# Patient Record
Sex: FEMALE | Race: WHITE | NOT HISPANIC OR LATINO | Employment: OTHER | URBAN - METROPOLITAN AREA
[De-identification: names, ages, dates, MRNs, and addresses within clinical notes are randomized per-mention and may not be internally consistent; named-entity substitution may affect disease eponyms.]

---

## 2017-01-09 ENCOUNTER — TRANSCRIBE ORDERS (OUTPATIENT)
Dept: ADMINISTRATIVE | Facility: HOSPITAL | Age: 37
End: 2017-01-09

## 2017-01-09 DIAGNOSIS — E04.9 NON-TOXIC GOITER: Primary | ICD-10-CM

## 2017-01-09 DIAGNOSIS — M25.561 RIGHT KNEE PAIN, UNSPECIFIED CHRONICITY: Primary | ICD-10-CM

## 2017-01-12 ENCOUNTER — HOSPITAL ENCOUNTER (OUTPATIENT)
Dept: RADIOLOGY | Facility: MEDICAL CENTER | Age: 37
Discharge: HOME | End: 2017-01-12
Payer: COMMERCIAL

## 2017-01-12 ENCOUNTER — TRANSCRIBE ORDERS (OUTPATIENT)
Dept: ADMINISTRATIVE | Facility: HOSPITAL | Age: 37
End: 2017-01-12

## 2017-01-12 ENCOUNTER — APPOINTMENT (OUTPATIENT)
Dept: LAB | Facility: MEDICAL CENTER | Age: 37
End: 2017-01-12
Payer: COMMERCIAL

## 2017-01-12 DIAGNOSIS — E04.9 ENLARGEMENT OF THYROID: Primary | ICD-10-CM

## 2017-01-12 DIAGNOSIS — E04.9 NON-TOXIC GOITER: ICD-10-CM

## 2017-01-12 LAB
T4 FREE SERPL-MCNC: 0.8 NG/DL (ref 0.76–1.46)
TSH SERPL DL<=0.05 MIU/L-ACNC: 1.22 UIU/ML (ref 0.36–3.74)

## 2017-01-12 PROCEDURE — 84439 ASSAY OF FREE THYROXINE: CPT | Performed by: FAMILY MEDICINE

## 2017-01-12 PROCEDURE — 84432 ASSAY OF THYROGLOBULIN: CPT | Performed by: FAMILY MEDICINE

## 2017-01-12 PROCEDURE — 86376 MICROSOMAL ANTIBODY EACH: CPT | Performed by: FAMILY MEDICINE

## 2017-01-12 PROCEDURE — 36415 COLL VENOUS BLD VENIPUNCTURE: CPT | Performed by: FAMILY MEDICINE

## 2017-01-12 PROCEDURE — 84443 ASSAY THYROID STIM HORMONE: CPT | Performed by: FAMILY MEDICINE

## 2017-01-12 PROCEDURE — 76536 US EXAM OF HEAD AND NECK: CPT

## 2017-01-12 PROCEDURE — 86800 THYROGLOBULIN ANTIBODY: CPT | Performed by: FAMILY MEDICINE

## 2017-01-13 LAB — THYROPEROXIDASE AB SERPL-ACNC: 6 IU/ML (ref 0–34)

## 2017-01-19 ENCOUNTER — HOSPITAL ENCOUNTER (OUTPATIENT)
Dept: MRI IMAGING | Facility: HOSPITAL | Age: 37
Discharge: HOME | End: 2017-01-19
Payer: COMMERCIAL

## 2017-01-19 ENCOUNTER — TELEPHONE (OUTPATIENT)
Dept: OTHER | Facility: HOSPITAL | Age: 37
End: 2017-01-19

## 2017-01-19 DIAGNOSIS — M25.561 RIGHT KNEE PAIN, UNSPECIFIED CHRONICITY: ICD-10-CM

## 2017-01-19 PROCEDURE — 73721 MRI JNT OF LWR EXTRE W/O DYE: CPT

## 2017-01-20 LAB
THYROGLOB AB SERPL-ACNC: 1 IU/ML (ref 0–0.9)
THYROGLOB SERPL-MCNC: 23 NG/ML

## 2017-01-24 ENCOUNTER — HOSPITAL ENCOUNTER (OUTPATIENT)
Dept: RADIOLOGY | Facility: HOSPITAL | Age: 37
Discharge: HOME | End: 2017-01-24
Attending: FAMILY MEDICINE | Admitting: RADIOLOGY
Payer: COMMERCIAL

## 2017-01-24 DIAGNOSIS — E04.9 ENLARGEMENT OF THYROID: ICD-10-CM

## 2017-01-24 PROCEDURE — 76942 ECHO GUIDE FOR BIOPSY: CPT

## 2017-01-24 PROCEDURE — 88172 CYTP DX EVAL FNA 1ST EA SITE: CPT | Performed by: DENTIST

## 2017-01-24 PROCEDURE — 88173 CYTOPATH EVAL FNA REPORT: CPT | Performed by: DENTIST

## 2017-01-24 PROCEDURE — 10022 HB FNA W/IMAGE: CPT

## 2017-01-24 RX ORDER — LIDOCAINE HYDROCHLORIDE 10 MG/ML
5 INJECTION, SOLUTION INFILTRATION; PERINEURAL
Status: COMPLETED | OUTPATIENT
Start: 2017-01-24 | End: 2017-01-24

## 2017-01-24 RX ORDER — SODIUM BICARBONATE 42 MG/ML
2.4 INJECTION, SOLUTION INTRAVENOUS
Status: COMPLETED | OUTPATIENT
Start: 2017-01-24 | End: 2017-01-24

## 2017-01-24 RX ADMIN — LIDOCAINE HYDROCHLORIDE 5 ML: 10 INJECTION, SOLUTION INFILTRATION; PERINEURAL at 09:35

## 2017-01-24 RX ADMIN — SODIUM BICARBONATE 2.4 MEQ: 42 SOLUTION INTRAVENOUS at 09:35

## 2017-03-13 ENCOUNTER — ALLSCRIPTS OFFICE VISIT (OUTPATIENT)
Dept: OTHER | Facility: OTHER | Age: 37
End: 2017-03-13

## 2017-03-13 DIAGNOSIS — E04.1 NONTOXIC SINGLE THYROID NODULE: ICD-10-CM

## 2017-03-13 DIAGNOSIS — R63.5 ABNORMAL WEIGHT GAIN: ICD-10-CM

## 2017-03-13 DIAGNOSIS — M25.561 PAIN IN RIGHT KNEE: ICD-10-CM

## 2017-03-21 ENCOUNTER — APPOINTMENT (OUTPATIENT)
Dept: PHYSICAL THERAPY | Facility: CLINIC | Age: 37
End: 2017-03-21
Payer: COMMERCIAL

## 2017-03-21 PROCEDURE — 97161 PT EVAL LOW COMPLEX 20 MIN: CPT

## 2017-03-21 PROCEDURE — 97110 THERAPEUTIC EXERCISES: CPT

## 2017-03-22 ENCOUNTER — GENERIC CONVERSION - ENCOUNTER (OUTPATIENT)
Dept: OTHER | Facility: OTHER | Age: 37
End: 2017-03-22

## 2017-03-23 ENCOUNTER — APPOINTMENT (OUTPATIENT)
Dept: PHYSICAL THERAPY | Facility: CLINIC | Age: 37
End: 2017-03-23
Payer: COMMERCIAL

## 2017-03-28 ENCOUNTER — APPOINTMENT (OUTPATIENT)
Dept: PHYSICAL THERAPY | Facility: CLINIC | Age: 37
End: 2017-03-28
Payer: COMMERCIAL

## 2017-03-30 ENCOUNTER — APPOINTMENT (OUTPATIENT)
Dept: PHYSICAL THERAPY | Facility: CLINIC | Age: 37
End: 2017-03-30
Payer: COMMERCIAL

## 2017-05-08 ENCOUNTER — ALLSCRIPTS OFFICE VISIT (OUTPATIENT)
Dept: OTHER | Facility: OTHER | Age: 37
End: 2017-05-08

## 2017-05-08 DIAGNOSIS — R53.83 OTHER FATIGUE: ICD-10-CM

## 2017-05-08 DIAGNOSIS — R63.5 ABNORMAL WEIGHT GAIN: ICD-10-CM

## 2017-05-08 DIAGNOSIS — E55.9 VITAMIN D DEFICIENCY: ICD-10-CM

## 2017-05-08 DIAGNOSIS — E04.1 NONTOXIC SINGLE THYROID NODULE: ICD-10-CM

## 2017-05-18 ENCOUNTER — GENERIC CONVERSION - ENCOUNTER (OUTPATIENT)
Dept: OTHER | Facility: OTHER | Age: 37
End: 2017-05-18

## 2017-05-26 ENCOUNTER — LAB CONVERSION - ENCOUNTER (OUTPATIENT)
Dept: OTHER | Facility: OTHER | Age: 37
End: 2017-05-26

## 2017-05-26 LAB
25(OH)D3 SERPL-MCNC: 18 NG/ML (ref 30–100)
A/G RATIO (HISTORICAL): 1.6 (CALC) (ref 1–2.5)
ACTH PLAS-MCNC: 19 PG/ML (ref 6–50)
ALBUMIN SERPL BCP-MCNC: 3.9 G/DL (ref 3.6–5.1)
ALBUMIN SERPL BCP-MCNC: 3.9 G/DL (ref 3.6–5.1)
ALP SERPL-CCNC: 51 U/L (ref 33–115)
ALT SERPL W P-5'-P-CCNC: 9 U/L (ref 6–29)
AST SERPL W P-5'-P-CCNC: 11 U/L (ref 10–30)
BASOPHILS # BLD AUTO: 0.5 %
BASOPHILS # BLD AUTO: 37 CELLS/UL (ref 0–200)
BILIRUB SERPL-MCNC: 0.4 MG/DL (ref 0.2–1.2)
BILIRUBIN DIRECT (HISTORICAL): 0.1 MG/DL
BUN SERPL-MCNC: 11 MG/DL (ref 7–25)
BUN/CREA RATIO (HISTORICAL): NORMAL (CALC) (ref 6–22)
CALCIUM SERPL-MCNC: 9.1 MG/DL (ref 8.6–10.2)
CALCIUM SERPL-MCNC: 9.1 MG/DL (ref 8.6–10.2)
CHLORIDE SERPL-SCNC: 106 MMOL/L (ref 98–110)
CO2 SERPL-SCNC: 24 MMOL/L (ref 20–31)
CORTIS AM PEAK SERPL-SCNC: 11.2 MCG/DL
CREAT SERPL-MCNC: 0.68 MG/DL (ref 0.5–1.1)
DEPRECATED RDW RBC AUTO: 15.8 % (ref 11–15)
DHEA-SO4 (HISTORICAL): 191 MCG/DL (ref 23–266)
EGFR AFRICAN AMERICAN (HISTORICAL): 128 ML/MIN/1.73M2
EGFR-AMERICAN CALC (HISTORICAL): 110 ML/MIN/1.73M2
EOSINOPHIL # BLD AUTO: 263 CELLS/UL (ref 15–500)
EOSINOPHIL # BLD AUTO: 3.6 %
GAMMA GLOBULIN (HISTORICAL): 2.4 G/DL (CALC) (ref 1.9–3.7)
GLUCOSE (HISTORICAL): 94 MG/DL (ref 65–99)
HCT VFR BLD AUTO: 35.5 % (ref 35–45)
HGB BLD-MCNC: 11.3 G/DL (ref 11.7–15.5)
INDIRECT BILIRUBIN (HISTORICAL): 0.3 MG/DL (CALC) (ref 0.2–1.2)
LYMPHOCYTES # BLD AUTO: 2278 CELLS/UL (ref 850–3900)
LYMPHOCYTES # BLD AUTO: 31.2 %
MCH RBC QN AUTO: 24.5 PG (ref 27–33)
MCHC RBC AUTO-ENTMCNC: 31.7 G/DL (ref 32–36)
MCV RBC AUTO: 77.5 FL (ref 80–100)
MONOCYTES # BLD AUTO: 679 CELLS/UL (ref 200–950)
MONOCYTES (HISTORICAL): 9.3 %
NEUTROPHILS # BLD AUTO: 4044 CELLS/UL (ref 1500–7800)
NEUTROPHILS # BLD AUTO: 55.4 %
PHOSPHATE SERPL-MCNC: 3.6 MG/DL (ref 2.5–4.5)
PLATELET # BLD AUTO: 262 THOUSAND/UL (ref 140–400)
PMV BLD AUTO: 9.8 FL (ref 7.5–12.5)
POTASSIUM SERPL-SCNC: 4.3 MMOL/L (ref 3.5–5.3)
PTH-INTACT SERPL-MCNC: 91 PG/ML (ref 14–64)
RBC # BLD AUTO: 4.58 MILLION/UL (ref 3.8–5.1)
SODIUM SERPL-SCNC: 139 MMOL/L (ref 135–146)
T3 SERPL-MCNC: 96 NG/DL (ref 76–181)
T3FREE SERPL-MCNC: 3 PG/ML (ref 2.3–4.2)
T4 FREE SERPL-MCNC: 0.9 NG/DL (ref 0.8–1.8)
TOTAL PROTEIN (HISTORICAL): 6.3 G/DL (ref 6.1–8.1)
TSH SERPL DL<=0.05 MIU/L-ACNC: 2.02 MIU/L
WBC # BLD AUTO: 7.3 THOUSAND/UL (ref 3.8–10.8)

## 2017-06-02 ENCOUNTER — GENERIC CONVERSION - ENCOUNTER (OUTPATIENT)
Dept: OTHER | Facility: OTHER | Age: 37
End: 2017-06-02

## 2017-06-05 ENCOUNTER — TRANSCRIBE ORDERS (OUTPATIENT)
Dept: ADMINISTRATIVE | Facility: HOSPITAL | Age: 37
End: 2017-06-05

## 2017-06-05 DIAGNOSIS — Z01.818 PRE-OP EXAMINATION: Primary | ICD-10-CM

## 2017-06-06 ENCOUNTER — HOSPITAL ENCOUNTER (OUTPATIENT)
Dept: ULTRASOUND IMAGING | Facility: HOSPITAL | Age: 37
Discharge: HOME | End: 2017-06-06
Attending: OTOLARYNGOLOGY
Payer: COMMERCIAL

## 2017-06-06 DIAGNOSIS — Z01.818 PRE-OP EXAMINATION: ICD-10-CM

## 2017-06-06 PROCEDURE — 76536 US EXAM OF HEAD AND NECK: CPT

## 2017-06-21 ENCOUNTER — HOSPITAL ENCOUNTER (OUTPATIENT)
Facility: HOSPITAL | Age: 37
Setting detail: OUTPATIENT SURGERY
Discharge: HOME | End: 2017-06-21
Attending: OTOLARYNGOLOGY | Admitting: OTOLARYNGOLOGY
Payer: COMMERCIAL

## 2017-06-21 ENCOUNTER — ANESTHESIA EVENT (OUTPATIENT)
Dept: PERIOP | Facility: HOSPITAL | Age: 37
End: 2017-06-21
Payer: COMMERCIAL

## 2017-06-21 ENCOUNTER — ANESTHESIA (OUTPATIENT)
Dept: PERIOP | Facility: HOSPITAL | Age: 37
End: 2017-06-21
Payer: COMMERCIAL

## 2017-06-21 VITALS
TEMPERATURE: 97 F | DIASTOLIC BLOOD PRESSURE: 60 MMHG | RESPIRATION RATE: 18 BRPM | HEIGHT: 66 IN | SYSTOLIC BLOOD PRESSURE: 112 MMHG | WEIGHT: 160 LBS | OXYGEN SATURATION: 96 % | BODY MASS INDEX: 25.71 KG/M2 | HEART RATE: 74 BPM

## 2017-06-21 DIAGNOSIS — E04.1 NONTOXIC SINGLE THYROID NODULE: ICD-10-CM

## 2017-06-21 LAB — EXT PREGNANCY TEST URINE: NORMAL

## 2017-06-21 PROCEDURE — 81025 URINE PREGNANCY TEST: CPT | Performed by: ANESTHESIOLOGY

## 2017-06-21 PROCEDURE — 88305 TISSUE EXAM BY PATHOLOGIST: CPT | Performed by: OTOLARYNGOLOGY

## 2017-06-21 PROCEDURE — 88307 TISSUE EXAM BY PATHOLOGIST: CPT | Performed by: OTOLARYNGOLOGY

## 2017-06-21 RX ORDER — FENTANYL CITRATE 50 UG/ML
INJECTION, SOLUTION INTRAMUSCULAR; INTRAVENOUS AS NEEDED
Status: DISCONTINUED | OUTPATIENT
Start: 2017-06-21 | End: 2017-06-21 | Stop reason: SURG

## 2017-06-21 RX ORDER — MAGNESIUM HYDROXIDE 1200 MG/15ML
LIQUID ORAL AS NEEDED
Status: DISCONTINUED | OUTPATIENT
Start: 2017-06-21 | End: 2017-06-21 | Stop reason: HOSPADM

## 2017-06-21 RX ORDER — SODIUM CHLORIDE, SODIUM LACTATE, POTASSIUM CHLORIDE, CALCIUM CHLORIDE 600; 310; 30; 20 MG/100ML; MG/100ML; MG/100ML; MG/100ML
INJECTION, SOLUTION INTRAVENOUS CONTINUOUS PRN
Status: DISCONTINUED | OUTPATIENT
Start: 2017-06-21 | End: 2017-06-21

## 2017-06-21 RX ORDER — ONDANSETRON 2 MG/ML
INJECTION INTRAMUSCULAR; INTRAVENOUS AS NEEDED
Status: DISCONTINUED | OUTPATIENT
Start: 2017-06-21 | End: 2017-06-21 | Stop reason: SURG

## 2017-06-21 RX ORDER — FENTANYL CITRATE/PF 50 MCG/ML
25 SYRINGE (ML) INJECTION
Status: COMPLETED | OUTPATIENT
Start: 2017-06-21 | End: 2017-06-21

## 2017-06-21 RX ORDER — OXYCODONE HYDROCHLORIDE AND ACETAMINOPHEN 5; 325 MG/1; MG/1
2 TABLET ORAL EVERY 4 HOURS PRN
Qty: 30 TABLET | Refills: 0 | Status: SHIPPED | OUTPATIENT
Start: 2017-06-21 | End: 2017-07-01

## 2017-06-21 RX ORDER — LIDOCAINE HYDROCHLORIDE AND EPINEPHRINE 10; 10 MG/ML; UG/ML
INJECTION, SOLUTION INFILTRATION; PERINEURAL AS NEEDED
Status: DISCONTINUED | OUTPATIENT
Start: 2017-06-21 | End: 2017-06-21 | Stop reason: HOSPADM

## 2017-06-21 RX ORDER — EPHEDRINE SULFATE 50 MG/ML
INJECTION, SOLUTION INTRAVENOUS AS NEEDED
Status: DISCONTINUED | OUTPATIENT
Start: 2017-06-21 | End: 2017-06-21 | Stop reason: SURG

## 2017-06-21 RX ORDER — SUCCINYLCHOLINE CHLORIDE 20 MG/ML
INJECTION INTRAMUSCULAR; INTRAVENOUS AS NEEDED
Status: DISCONTINUED | OUTPATIENT
Start: 2017-06-21 | End: 2017-06-21 | Stop reason: SURG

## 2017-06-21 RX ORDER — ONDANSETRON 2 MG/ML
4 INJECTION INTRAMUSCULAR; INTRAVENOUS EVERY 4 HOURS PRN
Status: DISCONTINUED | OUTPATIENT
Start: 2017-06-21 | End: 2017-06-21 | Stop reason: HOSPADM

## 2017-06-21 RX ORDER — LIDOCAINE HYDROCHLORIDE 10 MG/ML
INJECTION, SOLUTION INFILTRATION; PERINEURAL AS NEEDED
Status: DISCONTINUED | OUTPATIENT
Start: 2017-06-21 | End: 2017-06-21 | Stop reason: SURG

## 2017-06-21 RX ORDER — OXYCODONE HYDROCHLORIDE AND ACETAMINOPHEN 5; 325 MG/1; MG/1
1 TABLET ORAL EVERY 4 HOURS PRN
Status: DISCONTINUED | OUTPATIENT
Start: 2017-06-21 | End: 2017-06-21 | Stop reason: HOSPADM

## 2017-06-21 RX ORDER — PROPOFOL 10 MG/ML
INJECTION, EMULSION INTRAVENOUS AS NEEDED
Status: DISCONTINUED | OUTPATIENT
Start: 2017-06-21 | End: 2017-06-21 | Stop reason: SURG

## 2017-06-21 RX ORDER — MIDAZOLAM HYDROCHLORIDE 1 MG/ML
INJECTION INTRAMUSCULAR; INTRAVENOUS AS NEEDED
Status: DISCONTINUED | OUTPATIENT
Start: 2017-06-21 | End: 2017-06-21 | Stop reason: SURG

## 2017-06-21 RX ORDER — SODIUM CHLORIDE, SODIUM LACTATE, POTASSIUM CHLORIDE, CALCIUM CHLORIDE 600; 310; 30; 20 MG/100ML; MG/100ML; MG/100ML; MG/100ML
INJECTION, SOLUTION INTRAVENOUS CONTINUOUS PRN
Status: DISCONTINUED | OUTPATIENT
Start: 2017-06-21 | End: 2017-06-21 | Stop reason: SURG

## 2017-06-21 RX ADMIN — FENTANYL CITRATE 50 MCG: 50 INJECTION INTRAMUSCULAR; INTRAVENOUS at 12:10

## 2017-06-21 RX ADMIN — FENTANYL CITRATE 25 MCG: 50 INJECTION, SOLUTION INTRAMUSCULAR; INTRAVENOUS at 14:08

## 2017-06-21 RX ADMIN — LIDOCAINE HYDROCHLORIDE 50 MG: 10 INJECTION, SOLUTION INFILTRATION; PERINEURAL at 12:10

## 2017-06-21 RX ADMIN — REMIFENTANIL HYDROCHLORIDE 0.15 MCG/KG/MIN: 1 INJECTION, POWDER, LYOPHILIZED, FOR SOLUTION INTRAVENOUS at 12:10

## 2017-06-21 RX ADMIN — SUCCINYLCHOLINE CHLORIDE 100 MG: 20 INJECTION, SOLUTION INTRAMUSCULAR; INTRAVENOUS at 12:10

## 2017-06-21 RX ADMIN — HYDROMORPHONE HYDROCHLORIDE 0.2 MG: 1 INJECTION, SOLUTION INTRAMUSCULAR; INTRAVENOUS; SUBCUTANEOUS at 14:30

## 2017-06-21 RX ADMIN — FENTANYL CITRATE 25 MCG: 50 INJECTION, SOLUTION INTRAMUSCULAR; INTRAVENOUS at 14:25

## 2017-06-21 RX ADMIN — HYDROMORPHONE HYDROCHLORIDE 0.2 MG: 1 INJECTION, SOLUTION INTRAMUSCULAR; INTRAVENOUS; SUBCUTANEOUS at 14:35

## 2017-06-21 RX ADMIN — FENTANYL CITRATE 50 MCG: 50 INJECTION INTRAMUSCULAR; INTRAVENOUS at 13:35

## 2017-06-21 RX ADMIN — MIDAZOLAM HYDROCHLORIDE 2 MG: 1 INJECTION, SOLUTION INTRAMUSCULAR; INTRAVENOUS at 11:59

## 2017-06-21 RX ADMIN — EPHEDRINE SULFATE 5 MG: 50 INJECTION, SOLUTION INTRAMUSCULAR; INTRAVENOUS; SUBCUTANEOUS at 12:29

## 2017-06-21 RX ADMIN — ONDANSETRON 4 MG: 2 INJECTION INTRAMUSCULAR; INTRAVENOUS at 11:59

## 2017-06-21 RX ADMIN — SODIUM CHLORIDE, SODIUM LACTATE, POTASSIUM CHLORIDE, AND CALCIUM CHLORIDE: .6; .31; .03; .02 INJECTION, SOLUTION INTRAVENOUS at 11:56

## 2017-06-21 RX ADMIN — FENTANYL CITRATE 25 MCG: 50 INJECTION, SOLUTION INTRAMUSCULAR; INTRAVENOUS at 14:20

## 2017-06-21 RX ADMIN — DEXAMETHASONE SODIUM PHOSPHATE 10 MG: 10 INJECTION INTRAMUSCULAR; INTRAVENOUS at 12:10

## 2017-06-21 RX ADMIN — PROPOFOL 200 MG: 10 INJECTION, EMULSION INTRAVENOUS at 12:10

## 2017-06-21 RX ADMIN — FENTANYL CITRATE 25 MCG: 50 INJECTION, SOLUTION INTRAMUSCULAR; INTRAVENOUS at 14:15

## 2017-06-21 RX ADMIN — HYDROMORPHONE HYDROCHLORIDE 0.2 MG: 1 INJECTION, SOLUTION INTRAMUSCULAR; INTRAVENOUS; SUBCUTANEOUS at 14:40

## 2017-06-21 RX ADMIN — OXYCODONE HYDROCHLORIDE AND ACETAMINOPHEN 1 TABLET: 5; 325 TABLET ORAL at 16:11

## 2017-08-14 ENCOUNTER — ALLSCRIPTS OFFICE VISIT (OUTPATIENT)
Dept: OTHER | Facility: OTHER | Age: 37
End: 2017-08-14

## 2017-12-05 ENCOUNTER — GENERIC CONVERSION - ENCOUNTER (OUTPATIENT)
Dept: OTHER | Facility: OTHER | Age: 37
End: 2017-12-05

## 2017-12-05 DIAGNOSIS — R63.5 ABNORMAL WEIGHT GAIN: ICD-10-CM

## 2017-12-05 DIAGNOSIS — R53.83 OTHER FATIGUE: ICD-10-CM

## 2017-12-05 DIAGNOSIS — E04.1 NONTOXIC SINGLE THYROID NODULE: ICD-10-CM

## 2018-01-11 NOTE — RESULT NOTES
Discussion/Summary   Thyroid function is normal  Vitamin D is low  Take viatmin D 5000 units over-the-counter  Hemoglobin is slightly low  Following the PCP for the same  Cortisol level is normal  send a copy of results if desired        Verified Results  (1) RENAL FUNCTION PANEL [id number] 07:30AM Michelle Delgado     Test Name Result Flag Reference   GLUCOSE 94 mg/dL  65-99   Fasting reference interval   UREA NITROGEN (BUN) 11 mg/dL  7-25   CREATININE 0 68 mg/dL  0 50-1 10   eGFR NON-AFR   AMERICAN 110 mL/min/1 73m2  > OR = 60   eGFR AFRICAN AMERICAN 128 mL/min/1 73m2  > OR = 60   BUN/CREATININE RATIO   0-69   NOT APPLICABLE (calc)   SODIUM 139 mmol/L  135-146   POTASSIUM 4 3 mmol/L  3 5-5 3   CHLORIDE 106 mmol/L     CARBON DIOXIDE 24 mmol/L  20-31   CALCIUM 9 1 mg/dL  8 6-10 2   PHOSPHATE (AS PHOSPHORUS) 3 6 mg/dL  2 5-4 5   ALBUMIN 3 9 g/dL  3 6-5 1     (1) HEPATIC FUNCTION PANEL [id number] 07:30AM Michelle Delgado     Test Name Result Flag Reference   PROTEIN, TOTAL 6 3 g/dL  6 1-8 1   ALBUMIN 3 9 g/dL  3 6-5 1   GLOBULIN 2 4 g/dL (calc)  1 9-3 7   ALBUMIN/GLOBULIN RATIO 1 6 (calc)  1 0-2 5   BILIRUBIN, TOTAL 0 4 mg/dL  0 2-1 2   BILIRUBIN, DIRECT 0 1 mg/dL  < OR = 0 2   BILIRUBIN, INDIRECT 0 3 mg/dL (calc)  0 2-1 2   ALKALINE PHOSPHATASE 51 U/L     AST 11 U/L  10-30   ALT 9 U/L  6-29     (1) CBC/PLT/DIFF [id number] 07:30AM Michelle Delgado     Test Name Result Flag Reference   WHITE BLOOD CELL COUNT 7 3 Thousand/uL  3 8-10 8   RED BLOOD CELL COUNT 4 58 Million/uL  3 80-5 10   HEMOGLOBIN 11 3 g/dL L 11 7-15 5   HEMATOCRIT 35 5 %  35 0-45 0   MCV 77 5 fL L 80 0-100 0   MCH 24 5 pg L 27 0-33 0   MCHC 31 7 g/dL L 32 0-36 0   RDW 15 8 % H 11 0-15 0   PLATELET COUNT 636 Thousand/uL  140-400   ABSOLUTE NEUTROPHILS 4044 cells/uL  1167-8579   ABSOLUTE LYMPHOCYTES 2278 cells/uL  850-3900   ABSOLUTE MONOCYTES 679 cells/uL  200-950   ABSOLUTE EOSINOPHILS 263 cells/uL     ABSOLUTE BASOPHILS 37 cells/uL  0-200 NEUTROPHILS 55 4 %     LYMPHOCYTES 31 2 %     MONOCYTES 9 3 %     EOSINOPHILS 3 6 %     BASOPHILS 0 5 %     MPV 9 8 fL  7 5-12 5     (1) ADRENOCORTICOTROPHIN [id number] 07:30AM "[employer]     Test Name Result Flag Reference   ACTH, PLASMA 19 pg/mL  6-50   Reference range applies only to specimens collected           between 7-10 AM      (1) CORTISOL AM SPECIMEN [id number] 07:30AM "[employer]     Test Name Result Flag Reference   CORTISOL, A M  11 2 mcg/dL     Reference Range  8 a m  (7-9 a m ) Specimen: 4 0-22 0     (1) DHEA-S [id number] 07:30AM "[employer]     Test Name Result Flag Reference   DHEA SULFATE 191 mcg/dL       (1) FREE T3 [id number] 07:30AM "[employer]   REPORT COMMENT:  FASTING:YES     Test Name Result Flag Reference   T3, FREE 3 0 pg/mL  2 3-4 2     (1) T3 TOTAL [id number] 07:30AM "[employer]     Test Name Result Flag Reference   T3, TOTAL 96 ng/dL       (1) T4, FREE [id number] 07:30AM "[employer]     Test Name Result Flag Reference   T4, FREE 0 9 ng/dL  0 8-1 8     (Q) PTH, INTACT AND CALCIUM [id number] 07:30AM "[employer]     Test Name Result Flag Reference   PARATHYROID HORMONE,$INTACT 91 pg/mL H 14-64   Interpretive Guide    Intact PTH           Calcium  ------------------    ----------           -------  Normal Parathyroid    Normal               Normal  Hypoparathyroidism    Low or Low Normal    Low  Hyperparathyroidism     Primary            Normal or High       High     Secondary          High                 Normal or Low     Tertiary           High                 High  Non-Parathyroid     Hypercalcemia      Low or Low Normal    High   CALCIUM 9 1 mg/dL  8 6-10 2     (Q) TSH, 3RD GENERATION [id number] 07:30AM "[employer]     Test Name Result Flag Reference   TSH 2 02 mIU/L     Reference Range                         > or = 20 Years  0 40-4 50                              Pregnancy Ranges            First trimester    0 26-2 66            Second trimester   0 55-2 73 Third trimester    0 43-2 91     *(Q) VITAMIN D, 25-HYDROXY, LC/MS/MS [id number] 07:30AM Jabier Chow     Test Name Result Flag Reference   VITAMIN D, 25-OH, TOTAL 18 ng/mL L    Vitamin D Status         25-OH Vitamin D:     Deficiency:                    <20 ng/mL  Insufficiency:             20 - 29 ng/mL  Optimal:                 > or = 30 ng/mL     For 25-OH Vitamin D testing on patients on   D2-supplementation and patients for whom quantitation   of D2 and D3 fractions is required, the QuestAssureD(TM)  25-OH VIT D, (D2,D3), LC/MS/MS is recommended: order   code 25730 (patients >2yrs)  For more information on this test, go to:  http://Sandata/faq/MSP490  (This link is being provided for   informational/educational purposes only )

## 2018-01-13 NOTE — MISCELLANEOUS
Message   Recorded as Task   Date: 05/18/2017 12:44 PM, Created By: Cinthia Smith   Task Name: Care Coordination   Assigned To: Maury Robertson   Regarding Patient: Bre Greco, Status: Active   Comment:    Cinthia Smith - 18 May 2017 12:44 PM     TASK CREATED  Patient called rather upset  She said she knows all of her testing came back negative  She had been having a difficult time swallowing and does not feel well  [facility] - 18 May 2017 12:46 PM     TASK REASSIGNED: Previously Assigned To ctr dm & endo clinical parisa,team  patient had a negative biopsy and is concerned because she is now experiencing trouble swallowing  Pt requested a different provider but since you saw her last figured we would ask what you recommend for her? She is not scheduled to come back for follow up until August    I spoke to her and again explained the management of nodules  She has not seen Dr Paul Amezquita  He can do surgery for a nodule that may cause difficulty swallowing  She is wondering why she has this cyst where it came from and how can it be removed without thyroid surgery?  Aspiration of cyst alone can remove the fluid but may come back in future      Signatures   Electronically signed by : MIGUEL Obregon ; May 18 2017  2:21PM EST                       (Author)

## 2018-01-14 VITALS
HEIGHT: 65 IN | HEART RATE: 72 BPM | BODY MASS INDEX: 26.68 KG/M2 | RESPIRATION RATE: 16 BRPM | WEIGHT: 160.13 LBS | DIASTOLIC BLOOD PRESSURE: 62 MMHG | SYSTOLIC BLOOD PRESSURE: 100 MMHG

## 2018-01-14 VITALS
BODY MASS INDEX: 26.74 KG/M2 | HEIGHT: 65 IN | SYSTOLIC BLOOD PRESSURE: 110 MMHG | DIASTOLIC BLOOD PRESSURE: 74 MMHG | WEIGHT: 160.5 LBS | HEART RATE: 64 BPM

## 2018-01-15 NOTE — MISCELLANEOUS
Provider Comments  Provider Comments:   NO CALL NO SHOW  Reschedule follow-up appointment  Signatures   Electronically signed by : Matthew Tinoco, [address];  Aug 16 2017  4:55PM EST                       (Author)

## 2018-01-23 ENCOUNTER — LAB CONVERSION - ENCOUNTER (OUTPATIENT)
Dept: OTHER | Facility: OTHER | Age: 38
End: 2018-01-23

## 2018-01-23 ENCOUNTER — GENERIC CONVERSION - ENCOUNTER (OUTPATIENT)
Dept: OTHER | Facility: OTHER | Age: 38
End: 2018-01-23

## 2018-01-23 LAB
T4 FREE SERPL-MCNC: 1 NG/DL (ref 0.8–1.8)
TSH SERPL DL<=0.05 MIU/L-ACNC: 5.22 MIU/L

## 2018-01-24 NOTE — RESULT NOTES
Discussion/Summary   Please inform patient that her TSH is slightly high, with normal free T4 will need repeat TSH, free T4 and free T3 before her next appointment    Please send the lab slips     Verified Results  (1) T4, FREE [id number] 01:03PM Elena Loredo     Test Name Result Flag Reference   T4, FREE 1 0 ng/dL  0 8-1 8     (Q) TSH, 3RD GENERATION [id number] 01:03PM Elena Loredo   REPORT COMMENT:  FASTING:NO     Test Name Result Flag Reference   TSH 5 22 mIU/L H    Reference Range                         > or = 20 Years  0 40-4 50                              Pregnancy Ranges            First trimester    0 26-2 66            Second trimester   0 55-2 73            Third trimester    0 43-2 91

## 2018-04-11 DIAGNOSIS — E04.1 NONTOXIC SINGLE THYROID NODULE: ICD-10-CM

## 2018-04-11 DIAGNOSIS — R63.5 ABNORMAL WEIGHT GAIN: ICD-10-CM

## 2018-04-11 DIAGNOSIS — E55.9 VITAMIN D DEFICIENCY: ICD-10-CM

## 2018-04-11 DIAGNOSIS — I48.0 PAROXYSMAL ATRIAL FIBRILLATION (HCC): ICD-10-CM

## 2018-04-11 DIAGNOSIS — R53.83 OTHER FATIGUE: ICD-10-CM

## 2018-07-30 ENCOUNTER — TELEPHONE (OUTPATIENT)
Dept: CARDIOLOGY CLINIC | Facility: CLINIC | Age: 38
End: 2018-07-30

## 2018-07-30 DIAGNOSIS — I48.91 ATRIAL FIBRILLATION, UNSPECIFIED TYPE (HCC): Primary | ICD-10-CM

## 2018-07-30 PROBLEM — I48.0 PAROXYSMAL ATRIAL FIBRILLATION (HCC): Status: ACTIVE | Noted: 2018-07-30

## 2018-07-30 PROBLEM — R07.89 OTHER CHEST PAIN: Status: ACTIVE | Noted: 2018-07-30

## 2018-07-30 NOTE — TELEPHONE ENCOUNTER
Pt reporting 2 incidents of mid sternal CP lasting for approx 28 seconds  Pt requesting an appt  S/W Dr Mary Valerio, 48 hour holter ordered and scheduled an OV

## 2018-07-31 ENCOUNTER — OFFICE VISIT (OUTPATIENT)
Dept: CARDIOLOGY CLINIC | Facility: CLINIC | Age: 38
End: 2018-07-31
Payer: COMMERCIAL

## 2018-07-31 VITALS
HEIGHT: 66 IN | OXYGEN SATURATION: 100 % | BODY MASS INDEX: 24.2 KG/M2 | SYSTOLIC BLOOD PRESSURE: 112 MMHG | DIASTOLIC BLOOD PRESSURE: 72 MMHG | HEART RATE: 59 BPM | WEIGHT: 150.6 LBS

## 2018-07-31 DIAGNOSIS — R07.89 OTHER CHEST PAIN: Primary | ICD-10-CM

## 2018-07-31 DIAGNOSIS — I48.0 PAROXYSMAL ATRIAL FIBRILLATION (HCC): ICD-10-CM

## 2018-07-31 PROCEDURE — 93000 ELECTROCARDIOGRAM COMPLETE: CPT | Performed by: INTERNAL MEDICINE

## 2018-07-31 PROCEDURE — 99214 OFFICE O/P EST MOD 30 MIN: CPT | Performed by: INTERNAL MEDICINE

## 2018-07-31 RX ORDER — FLUOXETINE 20 MG/1
20 TABLET, FILM COATED ORAL DAILY
COMMUNITY
End: 2019-05-22

## 2018-07-31 NOTE — PROGRESS NOTES
Cardiology Follow Up    Opal Cordova  1980  [id number]  [address] [facility] [address] [facility]  [phone]    1  Other chest pain  POCT ECG    Stress test only, exercise   2  Paroxysmal atrial fibrillation (HCC)         Discussion/Summary:      Atypical chest pain  EKG is unchanged  Symptoms are not exertional   Very brief and spaced out over the course of a week  Can check exercise treadmill test to exclude any ischemia  This does not sound suspicious for an arrhythmia or recurrence of her atrial fibrillation  I do not think a Holter monitor will be of much use here because her symptoms are so infrequent as well  Advised patient that if she should have recurrence of these, we can perform an event recorder  Otherwise, if stress test without any  Significant abnormalities, can follow up in the future with any change in symptoms  History of Present Illness:   [facility] is now 34years old  She has a history of paroxysmal atrial fibrillation  She has had 2 episodes of this  Both of these were in the setting of being pregnant  She was on sotalol during her pregnancies  After delivery, arrhythmia resolved  She was taken off of sotalol, and based on symptoms has remained stable over the last several years  I last saw her in 2016  she recently contacted the office because she was having symptoms of chest pain  She has had about 2 episodes over the last 2 weeks  These last less than a minute really only just a couple of seconds each  She feels that is kind of a squeezing sensation  It is distinct and different from her atrial fibrillation previously  She has felt no palpitations  No other associated symptoms  In between these episodes, she has felt perfectly normal without any limitations  On 1 occasion, she was sitting at the kitchen table    On the other, she was lying in bed  Does not seem positional     Problem List     Other chest pain    Paroxysmal atrial fibrillation ([address] )        History reviewed  No pertinent past medical history  Social History     Social History    Marital status: /Civil Union     Spouse name: N/A    Number of children: N/A    Years of education: N/A     Occupational History    Not on file  Social History Main Topics    Smoking status: Never Smoker    Smokeless tobacco: Never Used    Alcohol use Yes      Comment: 4 drinks per week    Drug use: No    Sexual activity: Not on file     Other Topics Concern    Not on file     Social History Narrative    No narrative on file      Family History   Problem Relation Age of Onset    Thyroid disease Mother      Past Surgical History:   Procedure Laterality Date     SECTION      AK THYROID LOBECTOMY,UNILAT Right 2017    Procedure: THYROID LOBECTOMY WITH RECURRENT LARYNGEAL NERVE MONITORING (IMPULSE MONITORING); Surgeon: Catrachito Robins MD;  Location: [facility];  Service: ENT       Current Outpatient Prescriptions:     FLUoxetine (PROzac) 20 MG tablet, Take 20 mg by mouth daily, Disp: , Rfl:   No Known Allergies    Vitals:    18 0821   BP: 112/72   BP Location: Right arm   Patient Position: Sitting   Cuff Size: Standard   Pulse: 59   SpO2: 100%   Weight: 68 3 kg (150 lb 9 6 oz)   Height: 5' 6" (1 676 m)     Vitals:    18 0821   Weight: 68 3 kg (150 lb 9 6 oz)      Height: 5' 6" (167 6 cm)   Body mass index is 24 31 kg/m²      Physical Exam:  GENERAL: Alert, well appearing, and in no distress  HEENT:  PERRL, EOMI, no scleral icterus, no conjunctival pallor  NECK:  Supple, No elevated JVP, no thyromegaly, no carotid bruits  HEART:  Regular rate and rhythm, normal S1/S2, no S3/S4, no murmur or rub  LUNGS:  Clear to auscultation bilaterally  ABDOMEN:  Soft, non-tender, positive bowel sounds, no rebound or guarding  EXTREMITIES:  No edema  VASCULAR:  Normal pedal pulses   NEURO: No focal deficits,  SKIN: Normal without suspicious lesions on exposed skin      ROS:  Except as noted in HPI, is otherwise reviewed in detail and a 12 point review of systems is negative  Labs:  Lab Results   Component Value Date     05/19/2017    K 4 3 05/19/2017     05/19/2017    CREATININE 0 68 05/19/2017    BUN 11 05/19/2017    CO2 24 05/19/2017    ALT 9 05/19/2017    AST 11 05/19/2017    INR 0 99 11/08/2014    WBC 7 3 05/19/2017    HGB 11 3 (L) 05/19/2017    HCT 35 5 05/19/2017     05/19/2017     Testing:    Echo 11/9/14:  LEFT VENTRICLE:   Systolic function was at the lower limits of normal  Ejection fraction was   estimated to be 53 %  Left ventricular segmental and global longitudinal    strain   was within normal limits  There were no regional wall motion abnormalities  There was no evidence of concentric hypertrophy  TRICUSPID VALVE:   There was trace regurgitation  PULMONIC VALVE:   There was trace regurgitation  EKG:    Sinus rhythm  60 beats per minute  Poor anterior R-wave progression  Low voltage  No change when compared to prior

## 2019-05-22 ENCOUNTER — OFFICE VISIT (OUTPATIENT)
Dept: OBGYN CLINIC | Facility: CLINIC | Age: 39
End: 2019-05-22
Payer: COMMERCIAL

## 2019-05-22 ENCOUNTER — TELEPHONE (OUTPATIENT)
Dept: OBGYN CLINIC | Facility: CLINIC | Age: 39
End: 2019-05-22

## 2019-05-22 VITALS
DIASTOLIC BLOOD PRESSURE: 72 MMHG | BODY MASS INDEX: 25.07 KG/M2 | HEIGHT: 66 IN | SYSTOLIC BLOOD PRESSURE: 120 MMHG | WEIGHT: 156 LBS

## 2019-05-22 DIAGNOSIS — N92.0 MENORRHAGIA WITH REGULAR CYCLE: ICD-10-CM

## 2019-05-22 DIAGNOSIS — Z01.419 ROUTINE GYNECOLOGICAL EXAMINATION: Primary | ICD-10-CM

## 2019-05-22 DIAGNOSIS — Z12.31 ENCOUNTER FOR SCREENING MAMMOGRAM FOR MALIGNANT NEOPLASM OF BREAST: ICD-10-CM

## 2019-05-22 PROBLEM — M25.561 RIGHT KNEE PAIN: Status: ACTIVE | Noted: 2017-03-13

## 2019-05-22 PROBLEM — E55.9 MILD VITAMIN D DEFICIENCY: Status: ACTIVE | Noted: 2017-05-08

## 2019-05-22 PROBLEM — R63.5 ABNORMAL WEIGHT GAIN: Status: ACTIVE | Noted: 2017-03-13

## 2019-05-22 PROBLEM — R53.83 FEELING TIRED: Status: ACTIVE | Noted: 2017-03-13

## 2019-05-22 PROBLEM — E04.1 THYROID NODULE: Status: ACTIVE | Noted: 2017-03-13

## 2019-05-22 PROCEDURE — 99386 PREV VISIT NEW AGE 40-64: CPT | Performed by: PHYSICIAN ASSISTANT

## 2019-05-23 LAB
BASOPHILS # BLD AUTO: 86 CELLS/UL (ref 0–200)
BASOPHILS NFR BLD AUTO: 1.1 %
EOSINOPHIL # BLD AUTO: 320 CELLS/UL (ref 15–500)
EOSINOPHIL NFR BLD AUTO: 4.1 %
ERYTHROCYTE [DISTWIDTH] IN BLOOD BY AUTOMATED COUNT: 14.9 % (ref 11–15)
HCT VFR BLD AUTO: 31.9 % (ref 35–45)
HGB BLD-MCNC: 9.8 G/DL (ref 11.7–15.5)
LYMPHOCYTES # BLD AUTO: 1739 CELLS/UL (ref 850–3900)
LYMPHOCYTES NFR BLD AUTO: 22.3 %
MCH RBC QN AUTO: 22.4 PG (ref 27–33)
MCHC RBC AUTO-ENTMCNC: 30.7 G/DL (ref 32–36)
MCV RBC AUTO: 72.8 FL (ref 80–100)
MONOCYTES # BLD AUTO: 718 CELLS/UL (ref 200–950)
MONOCYTES NFR BLD AUTO: 9.2 %
NEUTROPHILS # BLD AUTO: 4937 CELLS/UL (ref 1500–7800)
NEUTROPHILS NFR BLD AUTO: 63.3 %
PLATELET # BLD AUTO: 290 THOUSAND/UL (ref 140–400)
PMV BLD REES-ECKER: 11.4 FL (ref 7.5–12.5)
RBC # BLD AUTO: 4.38 MILLION/UL (ref 3.8–5.1)
T4 FREE SERPL-MCNC: 0.8 NG/DL (ref 0.8–1.8)
TSH SERPL-ACNC: 3.09 MIU/L
WBC # BLD AUTO: 7.8 THOUSAND/UL (ref 3.8–10.8)

## 2019-05-30 LAB
HPV HR 12 DNA CVX QL NAA+PROBE: NOT DETECTED
HPV16 DNA SPEC QL NAA+PROBE: NOT DETECTED
HPV18 DNA SPEC QL NAA+PROBE: NOT DETECTED
THIN PREP CVX: NORMAL

## 2020-05-18 ENCOUNTER — OFFICE VISIT (OUTPATIENT)
Dept: OBGYN CLINIC | Facility: CLINIC | Age: 40
End: 2020-05-18
Payer: COMMERCIAL

## 2020-05-18 VITALS
HEIGHT: 66 IN | BODY MASS INDEX: 25.13 KG/M2 | TEMPERATURE: 100.6 F | DIASTOLIC BLOOD PRESSURE: 76 MMHG | SYSTOLIC BLOOD PRESSURE: 124 MMHG | WEIGHT: 156.4 LBS

## 2020-05-18 DIAGNOSIS — N71.9 ENDOMETRITIS: Primary | ICD-10-CM

## 2020-05-18 DIAGNOSIS — N71.9 ENDOMETRITIS: ICD-10-CM

## 2020-05-18 PROCEDURE — 99213 OFFICE O/P EST LOW 20 MIN: CPT | Performed by: OBSTETRICS & GYNECOLOGY

## 2020-05-18 RX ORDER — METRONIDAZOLE 500 MG/1
500 TABLET ORAL EVERY 12 HOURS SCHEDULED
Qty: 14 TABLET | Refills: 0 | Status: SHIPPED | OUTPATIENT
Start: 2020-05-18 | End: 2020-05-25

## 2020-05-18 RX ORDER — AZITHROMYCIN 250 MG/1
250 TABLET, FILM COATED ORAL EVERY 24 HOURS
Qty: 8 TABLET | Refills: 0 | Status: SHIPPED | OUTPATIENT
Start: 2020-05-18 | End: 2020-05-25

## 2020-05-18 RX ORDER — DOXYCYCLINE HYCLATE 100 MG/1
CAPSULE ORAL
Refills: 0 | OUTPATIENT
Start: 2020-05-18

## 2020-05-18 RX ORDER — DOXYCYCLINE HYCLATE 100 MG/1
100 TABLET, DELAYED RELEASE ORAL 2 TIMES DAILY
Qty: 14 TABLET | Refills: 0 | Status: SHIPPED | OUTPATIENT
Start: 2020-05-18 | End: 2020-05-18 | Stop reason: ALTCHOICE

## 2020-05-21 ENCOUNTER — TELEPHONE (OUTPATIENT)
Dept: OBGYN CLINIC | Facility: CLINIC | Age: 40
End: 2020-05-21

## 2020-05-21 LAB
C TRACH RRNA SPEC QL NAA+PROBE: NOT DETECTED
N GONORRHOEA RRNA SPEC QL NAA+PROBE: NOT DETECTED
QUESTION/PROBLEM: NORMAL
SL AMB CONTAINER TYPE: NORMAL
SL AMB FINAL RESOLUTION: NORMAL
SL AMB REPORT STATUS: NORMAL

## 2020-05-28 ENCOUNTER — TELEPHONE (OUTPATIENT)
Dept: OBGYN CLINIC | Facility: CLINIC | Age: 40
End: 2020-05-28

## 2020-06-01 DIAGNOSIS — B37.3 VAGINAL YEAST INFECTION: Primary | ICD-10-CM

## 2020-06-01 RX ORDER — FLUCONAZOLE 150 MG/1
TABLET ORAL
Qty: 2 TABLET | Refills: 0 | Status: SHIPPED | OUTPATIENT
Start: 2020-06-01 | End: 2020-06-03

## 2020-06-24 ENCOUNTER — TELEPHONE (OUTPATIENT)
Dept: OBGYN CLINIC | Facility: CLINIC | Age: 40
End: 2020-06-24

## 2020-12-08 ENCOUNTER — OFFICE VISIT (OUTPATIENT)
Dept: URGENT CARE | Age: 40
End: 2020-12-08
Payer: COMMERCIAL

## 2020-12-08 VITALS — BODY MASS INDEX: 24.11 KG/M2 | WEIGHT: 150 LBS | HEIGHT: 66 IN

## 2020-12-08 DIAGNOSIS — Z20.822 EXPOSURE TO COVID-19 VIRUS: Primary | ICD-10-CM

## 2020-12-08 PROCEDURE — U0003 INFECTIOUS AGENT DETECTION BY NUCLEIC ACID (DNA OR RNA); SEVERE ACUTE RESPIRATORY SYNDROME CORONAVIRUS 2 (SARS-COV-2) (CORONAVIRUS DISEASE [COVID-19]), AMPLIFIED PROBE TECHNIQUE, MAKING USE OF HIGH THROUGHPUT TECHNOLOGIES AS DESCRIBED BY CMS-2020-01-R: HCPCS | Performed by: NURSE PRACTITIONER

## 2020-12-08 PROCEDURE — G0382 LEV 3 HOSP TYPE B ED VISIT: HCPCS | Performed by: NURSE PRACTITIONER

## 2020-12-10 LAB — SARS-COV-2 RNA SPEC QL NAA+PROBE: NOT DETECTED

## 2021-01-25 ENCOUNTER — TELEPHONE (OUTPATIENT)
Dept: OBGYN CLINIC | Facility: CLINIC | Age: 41
End: 2021-01-25

## 2021-01-25 NOTE — TELEPHONE ENCOUNTER
Pt states for the last week she has been experiencing mild pelvic pain  Discharge is thicker and a cream whiter color  Denies any odor  Denies cramping or sharp stabbing pelvic pain  Apt scheduled tomorrow at 1400

## 2021-01-26 ENCOUNTER — OFFICE VISIT (OUTPATIENT)
Dept: OBGYN CLINIC | Facility: CLINIC | Age: 41
End: 2021-01-26
Payer: COMMERCIAL

## 2021-01-26 VITALS
HEIGHT: 66 IN | SYSTOLIC BLOOD PRESSURE: 110 MMHG | BODY MASS INDEX: 24.43 KG/M2 | DIASTOLIC BLOOD PRESSURE: 64 MMHG | WEIGHT: 152 LBS

## 2021-01-26 DIAGNOSIS — B96.89 BV (BACTERIAL VAGINOSIS): ICD-10-CM

## 2021-01-26 DIAGNOSIS — N76.0 BV (BACTERIAL VAGINOSIS): ICD-10-CM

## 2021-01-26 DIAGNOSIS — N89.8 VAGINAL DISCHARGE: Primary | ICD-10-CM

## 2021-01-26 LAB
BV WHIFF TEST VAG QL: ABNORMAL
CLUE CELLS SPEC QL WET PREP: ABNORMAL
PH SMN: 5.5 [PH]
SL AMB POCT WET MOUNT: ABNORMAL
T VAGINALIS VAG QL WET PREP: ABNORMAL
YEAST VAG QL WET PREP: ABNORMAL

## 2021-01-26 PROCEDURE — 99213 OFFICE O/P EST LOW 20 MIN: CPT | Performed by: NURSE PRACTITIONER

## 2021-01-26 RX ORDER — METRONIDAZOLE 500 MG/1
500 TABLET ORAL EVERY 12 HOURS SCHEDULED
Qty: 14 TABLET | Refills: 0 | Status: SHIPPED | OUTPATIENT
Start: 2021-01-26 | End: 2021-02-02

## 2021-01-26 NOTE — PATIENT INSTRUCTIONS
Endometrial Ablation - menorrhagia N92 0  WHAT YOU NEED TO KNOW:   What do I need to know about endometrial ablation? Endometrial ablation is a procedure to remove the endometrium (lining of your uterus)  You may need this procedure if you have heavy or abnormal vaginal bleeding  How do I prepare for the procedure? Your healthcare provider will talk to you about how to prepare for the procedure  You may be told not to eat or drink anything after midnight on the day of your procedure  You will also be told what medicines to take or not take on the day of your procedure  You may need someone to drive you home after the procedure and stay with you to make sure you are okay  What will happen during the procedure? You may be given local anesthesia to numb the area  You may instead be given general anesthesia to keep you asleep and free from pain during the procedure  Your healthcare provider will widen the opening of your cervix with medicine or medical tools  Ice, heated fluid, or electric energy may be used to remove the lining of your uterus  What should I expect after the procedure? You may feel some discomfort for a few days  This is normal and should stop soon  Contact your healthcare provider if any of the following becomes severe or continues:  · Cramps, similar to menstrual cramps, for 1 to 2 days    · Watery, bloody discharge for 2 to 3 days that may become light and last a few weeks    · Frequent urination for 24 hours    · Nausea    What are the risks of endometrial ablation? You may not be able to get pregnant after endometrial ablation  You may bleed more than expected or get an infection in your vagina, urinary tract, or uterus  Your cervix, uterus, or nearby organs may be burned or damaged  You may get a blood clot in your leg or arm  A blockage may form over months to years and cause blood to pool inside your uterus  This blockage may cause severe pain and you may need a hysterectomy   You may also need a hysterectomy if endometrial ablation does not work  CARE AGREEMENT:   You have the right to help plan your care  Learn about your health condition and how it may be treated  Discuss treatment options with your healthcare providers to decide what care you want to receive  You always have the right to refuse treatment  The above information is an  only  It is not intended as medical advice for individual conditions or treatments  Talk to your doctor, nurse or pharmacist before following any medical regimen to see if it is safe and effective for you  © Copyright [address] Information is for End User's use only and may not be sold, redistributed or otherwise used for commercial purposes  All illustrations and images included in CareNotes® are the copyrighted property of A D A M , Inc  or [address] [employer]   Metronidazole (By mouth)   Metronidazole (met-jamila-COSME-da-zole)  Treats bacterial infections  Brand Name(s): Flagsara Flagyl 375   There may be other brand names for this medicine  When This Medicine Should Not Be Used: This medicine is not right for everyone  Do not use if you had an allergic reaction to metronidazole or similar medicines  Do not use this medicine to treat trichomoniasis if you are in the first 3 months of pregnancy  How to Use This Medicine:   Capsule, Tablet, Long Acting Tablet  · Take your medicine as directed  Your dose may need to be changed several times to find what works best for you  · Capsule: Take with food or milk to avoid upset stomach  · Extended-release tablet: Take it on an empty stomach, 1 hour before or 2 hours after a meal   · Swallow the extended-release tablet whole  Do not crush, break, or chew it  · Take all of the medicine in your prescription to clear up your infection, even if you feel better after the first few doses  · Missed dose: Take a dose as soon as you remember   If it is almost time for your next dose, wait until then and take a regular dose  Do not take extra medicine to make up for a missed dose  · Store the medicine in a closed container at room temperature, away from heat, moisture, and direct light  Drugs and Foods to Avoid:   Ask your doctor or pharmacist before using any other medicine, including over-the-counter medicines, vitamins, and herbal products  · Do not use this medicine if you have taken disulfiram within the last 2 weeks  Do not drink alcohol or use medicine that contains alcohol or propylene glycol while using this medicine and for at least 3 days after treatment  · Some foods and medicines can affect how metronidazole works  Tell your doctor if you are using busulfan, cimetidine, lithium, phenobarbital, phenytoin, or a blood thinner (including warfarin)  Warnings While Using This Medicine:   · Tell your doctor if you are pregnant or breastfeeding, or if you have kidney disease, liver disease, a yeast infection (including oral thrush), Cockayne syndrome (genetic disorder), or a history of blood or bone marrow problems or seizures  · This medicine may cause the following problems:  ? Brain or nervous system problems, including peripheral neuropathy, encephalopathy, seizures, meningitis, or vision problems  ? Liver problems, which may be life-threatening  ? Serious skin reactions  · Trichomoniasis treatment:  Your doctor may want to also treat your sexual partner, even if he or she has no symptoms  Also, you may want to use a condom during sexual intercourse  These measures will help keep you from getting the infection back again from your partner  If you have any questions, ask your doctor  · Call your doctor if your symptoms do not improve or if they get worse  · Your doctor will do lab tests at regular visits to check on the effects of this medicine  Keep all appointments  · Tell any doctor or dentist who treats you that you are using this medicine   This medicine may affect certain medical test results  · Keep all medicine out of the reach of children  Never share your medicine with anyone  Possible Side Effects While Using This Medicine:   Call your doctor right away if you notice any of these side effects:  · Allergic reaction: Itching or hives, swelling in your face or hands, swelling or tingling in your mouth or throat, chest tightness, trouble breathing  · Blistering, peeling, red skin rash  · Confusion, drowsiness, headache, stiff neck or back  · Dark urine or pale stools, nausea, vomiting, loss of appetite, stomach pain, yellow skin or eyes  · Dizziness, problems with muscle control, clumsiness, shakiness, trouble talking  · Fever, chills, cough, sore throat, body aches  · Numbness, tingling, or burning pain in your hands, arms, legs, or feet  · Seizures  If you notice these less serious side effects, talk with your doctor:   · Unusual or unpleasant taste in your mouth  If you notice other side effects that you think are caused by this medicine, tell your doctor  Call your doctor for medical advice about side effects  You may report side effects to FDA at 5-719-FDA-9087  © Copyright [address] Information is for End User's use only and may not be sold, redistributed or otherwise used for commercial purposes  The above information is an  only  It is not intended as medical advice for individual conditions or treatments  Talk to your doctor, nurse or pharmacist before following any medical regimen to see if it is safe and effective for you  Bacterial Vaginosis   WHAT YOU NEED TO KNOW:   What is bacterial vaginosis? Bacterial vaginosis is an infection in the vagina  It may cause vaginitis (irritation and inflammation of the vagina)  The cause is not known  Bacteria normally found in the vagina are imbalanced  Your risk increases if you are sexually active, you use a douche, or you have an intrauterine device (IUD)       What are common signs and symptoms of bacterial vaginosis? · White, gray, or yellow vaginal discharge    · Vaginal discharge that smells like fish    · Itching or burning around the outside of your vagina    How is bacterial vaginosis diagnosed? Your healthcare provider will examine you and ask if you have other health conditions  He or she may need to take a sample of fluid from your vagina  This will be tested for the bacteria that causes vaginosis  How is bacterial vaginosis treated? Antibiotics are given to kill the bacteria  They may be given as a pill or as a cream to put in your vagina  What do I need to know about bacterial vaginosis and pregnancy? If you have bacterial vaginosis during pregnancy, your baby may be born early or have a low birth weight  Your healthcare provider may recommend testing for bacterial vaginosis before or during your pregnancy  He or she will talk to you about your risk for premature delivery, and make sure you know the benefits and risks of testing  How can I prevent bacterial vaginosis? · Keep your vaginal area clean and dry  Wear underwear and pantyhose with a cotton crotch  Wipe from front to back after you urinate or have a bowel movement  After you bathe, rinse soap from your vaginal area to decrease your risk for irritation  · Do not use products that cause irritation  Always use unscented tampons or sanitary pads  Do not use feminine sprays, powders, or scented tampons  They may cause irritation and increase your risk for vaginosis  Detergents and fabric softeners may also cause irritation  · Do not use a douche  This can cause an imbalance in healthy vaginal bacteria  · Use latex condoms during sex  This helps prevent another infection and keeps your partner from getting the infection  When should I call my doctor or gynecologist?   · Your symptoms come back or do not improve with treatment  · You have vaginal bleeding that is not your monthly period      · You have questions or concerns about your condition or care  CARE AGREEMENT:   You have the right to help plan your care  Learn about your health condition and how it may be treated  Discuss treatment options with your healthcare providers to decide what care you want to receive  You always have the right to refuse treatment  The above information is an  only  It is not intended as medical advice for individual conditions or treatments  Talk to your doctor, nurse or pharmacist before following any medical regimen to see if it is safe and effective for you  © Copyright [address] Information is for End User's use only and may not be sold, redistributed or otherwise used for commercial purposes   All illustrations and images included in CareNotes® are the copyrighted property of [employer]  or [address] [place] [address]

## 2021-01-26 NOTE — PROGRESS NOTES
Assessment/Plan:      Diagnoses and all orders for this visit:    Vaginal discharge  -     POCT wet mount    BV (bacterial vaginosis)  -     metroNIDAZOLE (FLAGYL) 500 mg tablet; Take 1 tablet (500 mg total) by mouth every 12 (twelve) hours for 7 days        -  Reviewed diagnosis of bacterial vaginosis  And treatment with metronidazole  Patient verbalized understanding to avoid alcohol use while taking medication  Written information provided  - hygiene reviewed including avoid scented soaps lotions and lubricants, avoid douching, avoid tight / restrictive clothing  Encouraged patient to clean diva cup per manufacture's instructions  Encouraged not to use antibacterial soap  Reviewed with patient probiotic may help alleviate episode  -reviewed with patient she is due for annual exam and mammogram  -patient states she has no insurance and is interested in revisiting possibility of endometrial ablation  Will call insurance to verify coverage and then make appointment    RTO for annual exam    Subjective:     Patient ID: Ted Liao is a 41 y o  female  HPI  here with complaints of vaginal discharge for 1 week  Discharge is described as thin, white  Associated symptoms include slight odor and abdominal cramping 2-3 days ago  Cramping has since resolved  Denies irritation or itching  Denies alleviating or aggravating factors  Has not tried any over-the-counter remedies  denies new partner, fever, chills, bowel or bladder concerns     last Pap smear 19 resulted NILM/ HR hpv negative  Has not had mammogram     Review of Systems   Constitutional: Negative for chills, fatigue and fever  Respiratory: Negative  Cardiovascular: Negative  Genitourinary: Positive for vaginal discharge  Negative for decreased urine volume, difficulty urinating, dyspareunia, enuresis, flank pain, frequency, genital sores, hematuria, menstrual problem, pelvic pain, urgency, vaginal bleeding and vaginal pain  Objective:     Physical Exam  Constitutional:       Appearance: Normal appearance  Cardiovascular:      Rate and Rhythm: Normal rate and regular rhythm  Pulmonary:      Effort: Pulmonary effort is normal       Breath sounds: Normal breath sounds  Abdominal:      General: Abdomen is flat  Palpations: Abdomen is soft  Hernia: There is no hernia in the left inguinal area or right inguinal area  Genitourinary:     General: Normal vulva  Labia:         Right: No rash, tenderness, lesion or injury  Left: No rash, tenderness, lesion or injury  Vagina: Vaginal discharge present  Cervix: Normal       Comments: Large amount of thin white vaginal discharge noted on exam  Lymphadenopathy:      Lower Body: No right inguinal adenopathy  No left inguinal adenopathy  Neurological:      Mental Status: She is alert and oriented to person, place, and time     Psychiatric:         Mood and Affect: Mood normal          Behavior: Behavior normal

## 2021-01-27 DIAGNOSIS — B96.89 BV (BACTERIAL VAGINOSIS): Primary | ICD-10-CM

## 2021-01-27 DIAGNOSIS — N76.0 BV (BACTERIAL VAGINOSIS): Primary | ICD-10-CM

## 2021-01-27 RX ORDER — METRONIDAZOLE 7.5 MG/G
1 GEL VAGINAL
Qty: 70 G | Refills: 0 | Status: SHIPPED | OUTPATIENT
Start: 2021-01-27 | End: 2021-02-01

## 2021-01-27 NOTE — TELEPHONE ENCOUNTER
T/c from patient states the Flagyl is making her stomach very upset , is it possible to have the metro gel instead   Please send to the [facility] listed in her chart    MM CMA

## 2021-02-03 DIAGNOSIS — B37.3 VAGINAL YEAST INFECTION: Primary | ICD-10-CM

## 2021-02-03 RX ORDER — FLUCONAZOLE 150 MG/1
150 TABLET ORAL EVERY OTHER DAY
Qty: 2 TABLET | Refills: 0 | Status: SHIPPED | OUTPATIENT
Start: 2021-02-03 | End: 2021-02-06

## 2021-02-03 NOTE — TELEPHONE ENCOUNTER
Pt took the five nights of metrogel 1/27-1/31/21 for her BV  Yesterday she started with a thick white discharge which is not normal for her with irritation  Pt would like a medication to treat her yeast infection  Diflucan sent to [facility] pharmacy  Pt aware if symptoms do no improve or worsen to contact office to schedule an apt

## 2021-02-05 ENCOUNTER — TELEPHONE (OUTPATIENT)
Dept: OBGYN CLINIC | Facility: CLINIC | Age: 41
End: 2021-02-05

## 2021-02-05 NOTE — TELEPHONE ENCOUNTER
Apt scheduled, has [employer] and doesn't believe that [facility] is on the card but will check and call back if it does  Pt aware endo bx to be done at apt and will discuss ablation procedure

## 2021-02-18 ENCOUNTER — TELEPHONE (OUTPATIENT)
Dept: OBGYN CLINIC | Facility: CLINIC | Age: 41
End: 2021-02-18

## 2021-02-19 ENCOUNTER — OFFICE VISIT (OUTPATIENT)
Dept: OBGYN CLINIC | Facility: CLINIC | Age: 41
End: 2021-02-19
Payer: COMMERCIAL

## 2021-02-19 VITALS
DIASTOLIC BLOOD PRESSURE: 78 MMHG | WEIGHT: 152 LBS | BODY MASS INDEX: 24.43 KG/M2 | HEIGHT: 66 IN | SYSTOLIC BLOOD PRESSURE: 116 MMHG

## 2021-02-19 DIAGNOSIS — N89.8 VAGINAL DISCHARGE: Primary | ICD-10-CM

## 2021-02-19 LAB
BV WHIFF TEST VAG QL: NORMAL
CLUE CELLS SPEC QL WET PREP: NORMAL
PH SMN: 7 [PH]
SL AMB POCT WET MOUNT: NORMAL
T VAGINALIS VAG QL WET PREP: NORMAL
YEAST VAG QL WET PREP: NORMAL

## 2021-02-19 PROCEDURE — 87210 SMEAR WET MOUNT SALINE/INK: CPT | Performed by: PHYSICIAN ASSISTANT

## 2021-02-19 PROCEDURE — 87070 CULTURE OTHR SPECIMN AEROBIC: CPT | Performed by: PHYSICIAN ASSISTANT

## 2021-02-19 PROCEDURE — 99213 OFFICE O/P EST LOW 20 MIN: CPT | Performed by: PHYSICIAN ASSISTANT

## 2021-02-19 NOTE — ASSESSMENT & PLAN NOTE
Reviewed ovulatory discharge seen on exam today  No abnormalities on wet mount today  Genital culture sent  For prevention: Recommend acidophilus or yogurt daily, avoid irritating soaps or lotions, no tight fitted pants or underwear, avoid bubble baths, do not stay in wet swimsuit  Office will call with results and appropriate follow up

## 2021-02-19 NOTE — PROGRESS NOTES
Assessment/Plan:    Vaginal discharge  Reviewed ovulatory discharge seen on exam today  No abnormalities on wet mount today  Genital culture sent  For prevention: Recommend acidophilus or yogurt daily, avoid irritating soaps or lotions, no tight fitted pants or underwear, avoid bubble baths, do not stay in wet swimsuit  Office will call with results and appropriate follow up  Problem List Items Addressed This Visit        Other    Vaginal discharge - Primary     Reviewed ovulatory discharge seen on exam today  No abnormalities on wet mount today  Genital culture sent  For prevention: Recommend acidophilus or yogurt daily, avoid irritating soaps or lotions, no tight fitted pants or underwear, avoid bubble baths, do not stay in wet swimsuit  Office will call with results and appropriate follow up  Relevant Orders    Genital Comprehensive Culture    POCT wet mount (Completed)            Subjective:      Patient ID: Sangita Pineda is a 41 y o  female  HPI   40 yo seen for persistent vaginal discharge  Patient was seen 1/26/2021 for vaginal discharge and slight abdominal cramping  Was diagnosed with bacterial vaginosis and treated with Metronidazole 500mg BID x 7 days  Patient could not tolerate oral therapy was given Metrogel x 5 nights  After Metrogel was completely patient reports had thick white discharge  Thought maybe yeast infection  Was treated with Diflucan x 2 doses  Thicker discharge  No itching  Slight odor  Patient had menses soon after  Reports after menses noticed thick white discharge was still present  Dull constant pelvic discomfort  Denies bowel or bladder issues  Reports menses are regular but have been getting heavier over the last few months  After last menses called office to make an appointment for uterine ablation discussion     The following portions of the patient's history were reviewed and updated as appropriate:   She  has a past medical history of A-fib ([facility] )  She   Patient Active Problem List    Diagnosis Date Noted    Vaginal discharge 2021    Routine gynecological examination 2019    Menorrhagia with regular cycle 2019    Other chest pain 2018    Mild vitamin D deficiency 2017    Abnormal weight gain 2017    Feeling tired 2017    Right knee pain 2017    Thyroid nodule 2017    Paroxysmal atrial fibrillation ([facility] ) 2015    Uterine scar from previous  delivery, antepartum condition or complication      She  has a past surgical history that includes  section and pr thyroid lobectomy,unilat (Right, 2017)  Her family history includes Breast cancer in her maternal aunt and maternal grandmother; Thyroid disease in her mother  She  reports that she has never smoked  She has never used smokeless tobacco  She reports current alcohol use  She reports that she does not use drugs  No current outpatient medications on file  No current facility-administered medications for this visit  She has No Known Allergies       Review of Systems   Constitutional: Negative for fatigue, fever and unexpected weight change  HENT: Negative for dental problem and sinus pressure  Eyes: Negative for visual disturbance  Respiratory: Negative for cough, shortness of breath and wheezing  Cardiovascular: Negative for chest pain  Gastrointestinal: Negative for abdominal pain, blood in stool, constipation, diarrhea, nausea and vomiting  Endocrine: Negative for polydipsia  Genitourinary: Negative for difficulty urinating, dyspareunia, dysuria, frequency, hematuria, pelvic pain and urgency  Musculoskeletal: Negative for arthralgias and back pain  Neurological: Negative for dizziness, seizures, light-headedness and headaches  Psychiatric/Behavioral: Negative for suicidal ideas  The patient is not nervous/anxious            Objective:      /78 (BP Location: Left arm, Patient Position: Sitting, Cuff Size: Standard)   Ht 5' 6" (1 676 m)   Wt 68 9 kg (152 lb)   LMP 02/05/2021 (Approximate)   BMI 24 53 kg/m²          Physical Exam  Constitutional:       Appearance: Normal appearance  She is well-developed  Neck:      Musculoskeletal: Neck supple  Thyroid: No thyroid mass or thyromegaly  Cardiovascular:      Rate and Rhythm: Normal rate and regular rhythm  Heart sounds: Normal heart sounds  No murmur  No friction rub  No gallop  Pulmonary:      Effort: Pulmonary effort is normal  No respiratory distress  Breath sounds: Normal breath sounds  No wheezing or rales  Abdominal:      General: Bowel sounds are normal  There is no distension  Palpations: Abdomen is soft  There is no mass  Tenderness: There is no abdominal tenderness  There is no guarding or rebound  Genitourinary:     Labia:         Right: No rash, tenderness, lesion or injury  Left: No rash, tenderness, lesion or injury  Urethra: No prolapse, urethral pain, urethral swelling or urethral lesion  Vagina: No signs of injury  Vaginal discharge (mostly clear white mucus discharge consistent with ovulatory discharge  small amount of thin white discharge  ) present  No erythema, tenderness or bleeding  Cervix: No cervical motion tenderness, discharge or friability  Uterus: Not deviated, not enlarged and not tender  Adnexa:         Right: No mass, tenderness or fullness  Left: No mass, tenderness or fullness  Lymphadenopathy:      Cervical: No cervical adenopathy  Upper Body:      Right upper body: No supraclavicular adenopathy  Left upper body: No supraclavicular adenopathy  Skin:     General: Skin is warm and dry  Coloration: Skin is not pale  Findings: No erythema or rash  Neurological:      Mental Status: She is alert and oriented to person, place, and time     Psychiatric:         Behavior: Behavior normal  Thought Content:  Thought content normal          Judgment: Judgment normal        wet mount: No trichomonas, clue cells, or yeast

## 2021-02-23 LAB — BACTERIA GENITAL AEROBE CULT: NORMAL

## 2021-03-03 ENCOUNTER — PROCEDURE VISIT (OUTPATIENT)
Dept: OBGYN CLINIC | Facility: CLINIC | Age: 41
End: 2021-03-03
Payer: COMMERCIAL

## 2021-03-03 VITALS
WEIGHT: 152 LBS | BODY MASS INDEX: 24.53 KG/M2 | SYSTOLIC BLOOD PRESSURE: 120 MMHG | TEMPERATURE: 97.7 F | DIASTOLIC BLOOD PRESSURE: 60 MMHG

## 2021-03-03 DIAGNOSIS — N92.0 MENORRHAGIA WITH REGULAR CYCLE: Primary | ICD-10-CM

## 2021-03-03 PROBLEM — Z01.419 ROUTINE GYNECOLOGICAL EXAMINATION: Status: RESOLVED | Noted: 2019-05-22 | Resolved: 2021-03-03

## 2021-03-03 PROCEDURE — 99213 OFFICE O/P EST LOW 20 MIN: CPT | Performed by: OBSTETRICS & GYNECOLOGY

## 2021-03-03 NOTE — PROGRESS NOTES
Here for endometrial biopsy with thoughts of proceeding with endometrial ablation but has not had any discussion about the procedure - menses are regular with very heavy for the 2nd and 3rd days for a total of 6 days - menses has always been like this but no recent changes - does affect lifestyle - does get cramping with menses -   Spent time reviewing options as well as risks and benefits of the procedures available  Will get pelvic US to evaluate uterus    Malik Dale is interested in having a Mirena for the menorrhagia  - given information to check coverage and then call for insertion  -     Iwona Hurst MD

## 2021-03-03 NOTE — PROGRESS NOTES
Endometrial biopsy    Date/Time: 3/3/2021 8:17 AM  Performed by: Nanda Brower MD  Authorized by: Nanda Brower MD

## 2021-03-10 ENCOUNTER — HOSPITAL ENCOUNTER (OUTPATIENT)
Dept: RADIOLOGY | Facility: MEDICAL CENTER | Age: 41
Discharge: HOME | End: 2021-03-10
Admitting: OBSTETRICS & GYNECOLOGY
Payer: COMMERCIAL

## 2021-03-10 DIAGNOSIS — N92.0 MENORRHAGIA WITH REGULAR CYCLE: ICD-10-CM

## 2021-03-10 PROCEDURE — 76830 TRANSVAGINAL US NON-OB: CPT

## 2021-03-10 PROCEDURE — 76856 US EXAM PELVIC COMPLETE: CPT

## 2021-03-18 ENCOUNTER — TELEPHONE (OUTPATIENT)
Dept: OBGYN CLINIC | Facility: CLINIC | Age: 41
End: 2021-03-18

## 2021-03-29 ENCOUNTER — TELEPHONE (OUTPATIENT)
Dept: OBGYN CLINIC | Facility: CLINIC | Age: 41
End: 2021-03-29

## 2021-03-29 NOTE — TELEPHONE ENCOUNTER
Moved pt insertion from 4/6/21 to 4/2/21  LMP 3/27/21 unsure how long her cycle is or how her bleeding will be  Pt aware to update office when we get closer to apt

## 2021-04-02 ENCOUNTER — PROCEDURE VISIT (OUTPATIENT)
Dept: OBGYN CLINIC | Facility: CLINIC | Age: 41
End: 2021-04-02
Payer: COMMERCIAL

## 2021-04-02 VITALS
SYSTOLIC BLOOD PRESSURE: 118 MMHG | BODY MASS INDEX: 23.95 KG/M2 | HEIGHT: 66 IN | WEIGHT: 149 LBS | DIASTOLIC BLOOD PRESSURE: 78 MMHG

## 2021-04-02 DIAGNOSIS — Z30.430 ENCOUNTER FOR INSERTION OF MIRENA IUD: Primary | ICD-10-CM

## 2021-04-02 NOTE — PROGRESS NOTES
Assessment/Plan:    Encounter for insertion of mirena IUD  No intercourse, tampon use, soaking in bath tub, or swimming for the next 2-3 days to avoid risk of infection  Call office with excessive bleeding, cramping, fever, or chills  Return to office for annual or as needed  Problem List Items Addressed This Visit        Other    Encounter for insertion of mirena IUD - Primary     No intercourse, tampon use, soaking in bath tub, or swimming for the next 2-3 days to avoid risk of infection  Call office with excessive bleeding, cramping, fever, or chills  Return to office for annual or as needed  Subjective:      Patient ID: Sheron Dawn is a 35 y o  female  HPI  34 yo seen for Mirena IUD insertion  Using for menorrhagia  Negative pregnancy test in office  The following portions of the patient's history were reviewed and updated as appropriate:   She  has a past medical history of A-fib ([facility] )  She   Patient Active Problem List    Diagnosis Date Noted    Encounter for insertion of mirena IUD 2021    Vaginal discharge 2021    Menorrhagia with regular cycle 2019    Other chest pain 2018    Mild vitamin D deficiency 2017    Abnormal weight gain 2017    Feeling tired 2017    Right knee pain 2017    Thyroid nodule 2017    Paroxysmal atrial fibrillation ([facility] [address] ) 2015     She  has a past surgical history that includes  section and pr thyroid lobectomy,unilat (Right, 2017)  Her family history includes Breast cancer in her maternal aunt and maternal grandmother; Thyroid disease in her mother  She  reports that she has never smoked  She has never used smokeless tobacco  She reports current alcohol use  She reports that she does not use drugs  No current outpatient medications on file  No current facility-administered medications for this visit  She has No Known Allergies       Review of Systems Constitutional: Negative for fatigue, fever and unexpected weight change  HENT: Negative for dental problem and sinus pressure  Eyes: Negative for visual disturbance  Respiratory: Negative for cough, shortness of breath and wheezing  Cardiovascular: Negative for chest pain  Gastrointestinal: Negative for abdominal pain, blood in stool, constipation, diarrhea, nausea and vomiting  Endocrine: Negative for polydipsia  Genitourinary: Negative for difficulty urinating, dyspareunia, dysuria, frequency, hematuria, pelvic pain and urgency  Musculoskeletal: Negative for arthralgias and back pain  Neurological: Negative for dizziness, seizures, light-headedness and headaches  Psychiatric/Behavioral: Negative for suicidal ideas  The patient is not nervous/anxious  Objective:      /78 (BP Location: Left arm, Patient Position: Sitting, Cuff Size: Standard)   Ht 5' 6" (1 676 m)   Wt 67 6 kg (149 lb)   LMP 03/27/2021 (Exact Date)   BMI 24 05 kg/m²          Physical Exam  Constitutional:       Appearance: She is well-developed  Genitourinary:     Labia:         Right: No rash, tenderness, lesion or injury  Left: No rash, tenderness, lesion or injury  Vagina: No signs of injury and foreign body  No vaginal discharge, erythema, tenderness or bleeding  Cervix: No cervical motion tenderness, discharge or friability  Skin:     General: Skin is warm and dry  Coloration: Skin is not pale  Findings: No erythema or rash  Neurological:      Mental Status: She is alert and oriented to person, place, and time  Iud insertions    Date/Time: 4/5/2021 2:33 PM  Performed by: Garrett Cosme PA-C  Authorized by: Garrett Cosme PA-C   Universal Protocol:  Procedure performed by:  Consent: Verbal consent obtained    Risks and benefits: risks, benefits and alternatives were discussed  Consent given by: patient  Time out: Immediately prior to procedure a "time out" was called to verify the correct patient, procedure, equipment, support staff and site/side marked as required  Patient understanding: patient states understanding of the procedure being performed  Patient consent: the patient's understanding of the procedure matches consent given  Procedure consent: procedure consent matches procedure scheduled  Relevant documents: relevant documents present and verified  Test results: test results available and properly labeled  Radiology Images displayed and confirmed   If images not available, report reviewed: imaging studies available  Required items: required blood products, implants, devices, and special equipment available  Patient identity confirmed: verbally with patient        Procedure:     Pelvic exam performed: yes      Negative urine pregnancy test: yes      Cervix cleaned and prepped: yes (betadine)      Speculum placed in vagina: yes      Tenaculum applied to cervix: yes      Uterus sounded: yes      Uterus sound depth (cm):  8    IUD inserted with no complications: yes      IUD type:  Mirena    Strings trimmed: yes (1-2 cm)    Post-procedure:     Patient tolerated procedure well: yes

## 2021-04-05 PROBLEM — Z30.430 ENCOUNTER FOR INSERTION OF MIRENA IUD: Status: ACTIVE | Noted: 2021-04-05

## 2021-04-05 PROCEDURE — 58300 INSERT INTRAUTERINE DEVICE: CPT | Performed by: PHYSICIAN ASSISTANT

## 2021-04-05 NOTE — ASSESSMENT & PLAN NOTE
No intercourse, tampon use, soaking in bath tub, or swimming for the next 2-3 days to avoid risk of infection  Call office with excessive bleeding, cramping, fever, or chills  Return to office for annual or as needed

## 2021-04-12 ENCOUNTER — TELEPHONE (OUTPATIENT)
Dept: OBGYN CLINIC | Facility: CLINIC | Age: 41
End: 2021-04-12

## 2021-04-12 NOTE — TELEPHONE ENCOUNTER
Got Mirena and over past week been experiencing a lot of anxiety and panic attacks  Patient wasn't sure if was caused by the Mirena so hard or if she needs to call her PCP

## 2021-04-12 NOTE — TELEPHONE ENCOUNTER
Mood swings/ anxiety can be a common side effect  Unsure if she has a hx of anxiety in the past or not?? Usually moods swings will improve over the next month or so   I would have her see PCP either way, and if she desires we can remove IUD

## 2021-04-12 NOTE — TELEPHONE ENCOUNTER
lmom for patient stating it can be a common side effect, should see pcp and if would like or doesnt improve can remove the IUD  Most patient see improvement over 1-2 months

## 2021-05-28 ENCOUNTER — TELEPHONE (OUTPATIENT)
Dept: OBGYN CLINIC | Facility: CLINIC | Age: 41
End: 2021-05-28

## 2021-05-28 NOTE — TELEPHONE ENCOUNTER
Pt does use tampons  Pt is fairly certain this is not the case, having a second tampon inserted  Pt will check when she gets home from the store  Pt is comfortable with checking to see how she feels over the weekend and will call the service or back Tuesday if she has an increase in her symptoms

## 2021-05-28 NOTE — TELEPHONE ENCOUNTER
Pt had a Mirena inserted on 4/2/21  Pt started her menstrual cycle 5/16/21 and has had a light period for the last 12-13 days  Today pt noticed a horrific odor  Denies discharge, blood clots, pelvic pain, cramping, back pain, n/v, fever or chills  No recent intercourse maybe a week ago  Urination and bowels are normal  Last bowel movement this AM  Please review

## 2021-06-01 ENCOUNTER — OFFICE VISIT (OUTPATIENT)
Dept: OBGYN CLINIC | Facility: CLINIC | Age: 41
End: 2021-06-01
Payer: COMMERCIAL

## 2021-06-01 VITALS
SYSTOLIC BLOOD PRESSURE: 112 MMHG | WEIGHT: 147 LBS | BODY MASS INDEX: 23.63 KG/M2 | HEIGHT: 66 IN | DIASTOLIC BLOOD PRESSURE: 76 MMHG

## 2021-06-01 DIAGNOSIS — Z30.432 ENCOUNTER FOR IUD REMOVAL: ICD-10-CM

## 2021-06-01 DIAGNOSIS — B96.89 BACTERIAL VAGINOSIS: Primary | ICD-10-CM

## 2021-06-01 DIAGNOSIS — N89.8 VAGINAL ODOR: ICD-10-CM

## 2021-06-01 DIAGNOSIS — N76.0 BACTERIAL VAGINOSIS: Primary | ICD-10-CM

## 2021-06-01 PROBLEM — N92.1 MENORRHAGIA WITH IRREGULAR CYCLE: Status: ACTIVE | Noted: 2021-06-01

## 2021-06-01 LAB
BV WHIFF TEST VAG QL: ABNORMAL
CLUE CELLS SPEC QL WET PREP: ABNORMAL
PH SMN: 7 [PH]
SL AMB POCT WET MOUNT: ABNORMAL
T VAGINALIS VAG QL WET PREP: ABNORMAL
YEAST VAG QL WET PREP: ABNORMAL

## 2021-06-01 PROCEDURE — 58301 REMOVE INTRAUTERINE DEVICE: CPT | Performed by: PHYSICIAN ASSISTANT

## 2021-06-01 PROCEDURE — 87077 CULTURE AEROBIC IDENTIFY: CPT | Performed by: PHYSICIAN ASSISTANT

## 2021-06-01 PROCEDURE — 87070 CULTURE OTHR SPECIMN AEROBIC: CPT | Performed by: PHYSICIAN ASSISTANT

## 2021-06-01 PROCEDURE — 87147 CULTURE TYPE IMMUNOLOGIC: CPT | Performed by: PHYSICIAN ASSISTANT

## 2021-06-01 PROCEDURE — 87210 SMEAR WET MOUNT SALINE/INK: CPT | Performed by: PHYSICIAN ASSISTANT

## 2021-06-01 PROCEDURE — 99213 OFFICE O/P EST LOW 20 MIN: CPT | Performed by: PHYSICIAN ASSISTANT

## 2021-06-01 RX ORDER — ESCITALOPRAM OXALATE 5 MG/1
5 TABLET ORAL
COMMUNITY
Start: 2021-04-29 | End: 2022-05-23

## 2021-06-01 RX ORDER — CLINDAMYCIN HYDROCHLORIDE 300 MG/1
300 CAPSULE ORAL 2 TIMES DAILY
Qty: 14 CAPSULE | Refills: 0 | Status: SHIPPED | OUTPATIENT
Start: 2021-06-01 | End: 2021-06-08

## 2021-06-01 NOTE — ASSESSMENT & PLAN NOTE
Reviewed mood swings and prolonged cycles on Mirena IUD  Patient would like to proceed with uterine ablation for menorrhagia  IUD removed today per patient request  Task sent to triage to schedule uterine ablation and schedule office visit for endometrial biopsy

## 2021-06-01 NOTE — PROGRESS NOTES
Assessment/Plan:    Bacterial vaginosis  Reviewed BV on exam today  Will plan to treat with Clindamycin 300mg BID x 7 days (patient requests no flagyl secondary to upcoming wine tour and prefers oral treatment)  Genital culture done secondary to recurrence  For prevention: Recommend acidophilus or yogurt daily, avoid irritating soaps or lotions, no tight fitted pants or underwear, avoid bubble baths, do not stay in wet swimsuit  Menorrhagia with irregular cycle  Reviewed mood swings and prolonged cycles on Mirena IUD  Patient would like to proceed with uterine ablation for menorrhagia  IUD removed today per patient request  Task sent to triage to schedule uterine ablation and schedule office visit for endometrial biopsy  Problem List Items Addressed This Visit        Genitourinary    Bacterial vaginosis - Primary     Reviewed BV on exam today  Will plan to treat with Clindamycin 300mg BID x 7 days (patient requests no flagyl secondary to upcoming wine tour and prefers oral treatment)  Genital culture done secondary to recurrence  For prevention: Recommend acidophilus or yogurt daily, avoid irritating soaps or lotions, no tight fitted pants or underwear, avoid bubble baths, do not stay in wet swimsuit  Relevant Medications    clindamycin (CLEOCIN) 300 MG capsule      Other Visit Diagnoses     Vaginal odor        Relevant Orders    Genital Comprehensive Culture    POCT wet mount (Completed)            Subjective:      Patient ID: Kyree Galindo is a 35 y o  female  HPI  34 yo seen for vaginal odor  Pt had a Mirena inserted on 4/2/21  Pt started her menstrual cycle 5/16/21 and had a light period for the last 12-13 days  On 5/28/2021 pt noticed a horrific odor  Denies discharge, blood clots, pelvic pain, cramping, back pain, n/v, fever or chills  No recent intercourse maybe two weeks ago  Hx of recurrent BV in the past  Reports does not smell like anything she has had before     Denies bowel or bladder issues  Reports Had IUD inserted 2021  Reports has been having a lot of mood swings since being on the IUD  Had to start Lexapro to help  Cannot think of anything else that has changed  Reports menses are lighter on the IUD but does last 8-10 days  Has not been worth the side effects she is experiencing  Patient originally wanted uterine ablation but at discussion appointment with Dr Aure Perea decided to trial Mirena IUD  The following portions of the patient's history were reviewed and updated as appropriate:   She  has a past medical history of A-fib ([facility] [address] )  She   Patient Active Problem List    Diagnosis Date Noted    Bacterial vaginosis 2021    Menorrhagia with irregular cycle 2021    Encounter for insertion of mirena IUD 2021    Vaginal discharge 2021    Menorrhagia with regular cycle 2019    Other chest pain 2018    Mild vitamin D deficiency 2017    Abnormal weight gain 2017    Feeling tired 2017    Right knee pain 2017    Thyroid nodule 2017    Paroxysmal atrial fibrillation ([facility] [address] ) 2015     She  has a past surgical history that includes  section and pr thyroid lobectomy,unilat (Right, 2017)  Her family history includes Breast cancer in her maternal aunt and maternal grandmother; Thyroid disease in her mother  She  reports that she has never smoked  She has never used smokeless tobacco  She reports current alcohol use  She reports that she does not use drugs  Current Outpatient Medications   Medication Sig Dispense Refill    escitalopram (LEXAPRO) 5 mg tablet Take 5 mg by mouth daily      clindamycin (CLEOCIN) 300 MG capsule Take 1 capsule (300 mg total) by mouth 2 (two) times a day for 7 days 14 capsule 0    levonorgestrel (MIRENA) 20 MCG/24HR IUD 1 each by Intrauterine route       No current facility-administered medications for this visit  She has No Known Allergies       Review of Systems   Constitutional: Negative for fatigue, fever and unexpected weight change  HENT: Negative for dental problem and sinus pressure  Eyes: Negative for visual disturbance  Respiratory: Negative for cough, shortness of breath and wheezing  Cardiovascular: Negative for chest pain  Gastrointestinal: Negative for abdominal pain, blood in stool, constipation, diarrhea, nausea and vomiting  Endocrine: Negative for polydipsia  Genitourinary: Negative for difficulty urinating, dyspareunia, dysuria, frequency, hematuria, pelvic pain and urgency  Musculoskeletal: Negative for arthralgias and back pain  Neurological: Negative for dizziness, seizures, light-headedness and headaches  Psychiatric/Behavioral: Negative for suicidal ideas  The patient is nervous/anxious  Objective:      /76 (BP Location: Left arm, Patient Position: Sitting, Cuff Size: Standard)   Ht 5' 6" (1 676 m)   Wt 66 7 kg (147 lb)   LMP 05/16/2021 (Exact Date)   BMI 23 73 kg/m²            Physical Exam  Constitutional:       Appearance: Normal appearance  She is well-developed  Neck:      Musculoskeletal: Neck supple  Thyroid: No thyroid mass or thyromegaly  Cardiovascular:      Rate and Rhythm: Normal rate and regular rhythm  Heart sounds: Normal heart sounds  No murmur  No friction rub  No gallop  Pulmonary:      Effort: Pulmonary effort is normal  No respiratory distress  Breath sounds: Normal breath sounds  No wheezing or rales  Abdominal:      General: Bowel sounds are normal  There is no distension  Palpations: Abdomen is soft  There is no mass  Tenderness: There is no abdominal tenderness  There is no guarding or rebound  Genitourinary:     Labia:         Right: No rash, tenderness, lesion or injury  Left: No rash, tenderness, lesion or injury  Urethra: No prolapse, urethral pain, urethral swelling or urethral lesion  Vagina: No signs of injury   Vaginal discharge (thin watery white discharge in vaginal vault  ) present  No erythema, tenderness or bleeding  Cervix: No cervical motion tenderness, discharge or friability  Uterus: Not deviated, not enlarged and not tender  Adnexa:         Right: No mass, tenderness or fullness  Left: No mass, tenderness or fullness  Lymphadenopathy:      Cervical: No cervical adenopathy  Upper Body:      Right upper body: No supraclavicular adenopathy  Left upper body: No supraclavicular adenopathy  Skin:     General: Skin is warm and dry  Coloration: Skin is not pale  Findings: No erythema or rash  Neurological:      Mental Status: She is alert and oriented to person, place, and time  Psychiatric:         Behavior: Behavior normal          Thought Content: Thought content normal          Judgment: Judgment normal          Wet mount: Clue cells throughout  No trichomonads or yeast    Iud removal    Date/Time: 6/1/2021 6:27 PM  Performed by: Devora Mejia PA-C  Authorized by: Devora Mejia PA-C   Universal Protocol:  Consent: Verbal consent obtained  Risks and benefits: risks, benefits and alternatives were discussed  Consent given by: patient  Time out: Immediately prior to procedure a "time out" was called to verify the correct patient, procedure, equipment, support staff and site/side marked as required    Patient understanding: patient states understanding of the procedure being performed  Patient consent: the patient's understanding of the procedure matches consent given  Procedure consent: procedure consent matches procedure scheduled  Relevant documents: relevant documents present and verified  Test results: test results available and properly labeled  Required items: required blood products, implants, devices, and special equipment available  Patient identity confirmed: verbally with patient      Procedure:     Removed with no complications: yes

## 2021-06-01 NOTE — TELEPHONE ENCOUNTER
Pt states she is still having the horrific vaginal odor, denies other symptoms  Pt would like a prescription she feels something is starting that could be more in a couple months  Pt states flagyl gives her a stomachache would like vaginal gel for medication

## 2021-06-03 ENCOUNTER — TELEPHONE (OUTPATIENT)
Dept: OBGYN CLINIC | Facility: CLINIC | Age: 41
End: 2021-06-03

## 2021-06-03 NOTE — TELEPHONE ENCOUNTER
Reviewed with patient  Also aware will start working on scheduling her surgery  Due to recovery patient would prefer to wait until September so that she can still swim with her kids

## 2021-06-03 NOTE — TELEPHONE ENCOUNTER
Normal for her to start a menses now that she had Mirena IUD removed  The IUD came out very easily  Have her continue to monitor should slow down  Take Ibuprofen for pain

## 2021-06-03 NOTE — TELEPHONE ENCOUNTER
Pt lmom  Had IUD removed yesterday and this morning she is having cramping and passed 3 golf ball sized blood clots   Wants to know if normal

## 2021-06-05 ENCOUNTER — NURSE TRIAGE (OUTPATIENT)
Dept: OTHER | Facility: OTHER | Age: 41
End: 2021-06-05

## 2021-06-05 LAB
BACTERIA GENITAL AEROBE CULT: ABNORMAL

## 2021-06-05 NOTE — TELEPHONE ENCOUNTER
Reason for Disposition   SEVERE vaginal bleeding (e g , soaking 2 pads or tampons per hour and present 2 or more hours; 1 menstrual cup every 2 hours)    Answer Assessment - Initial Assessment Questions  1  AMOUNT: "Describe the bleeding that you are having "     - SPOTTING: spotting, or pinkish / brownish mucous discharge; does not fill panti-liner or pad     - MILD:  less than 1 pad / hour; less than patient's usual menstrual bleeding    - MODERATE: 1-2 pads / hour; 1 menstrual cup every 6 hours; small-medium blood clots (e g , pea, grape, small coin)    - SEVERE: soaking 2 or more pads/hour for 2 or more hours; 1 menstrual cup every 2 hours; bleeding not contained by pads or continuous red blood from vagina; large blood clots (e g , golf ball, large coin)      Severe- large coin sized clots  Needing to used tampons and pads      2  ONSET: "When did the bleeding begin?" "Is it continuing now?"      Thursday, 6/3    3  MENSTRUAL PERIOD: "When was the last normal menstrual period?" "How is this different than your period?"      LMP 5/16/21  Typically occur once monthly    4  REGULARITY: "How regular are your periods?"      Regular but usually heavy     5  ABDOMINAL PAIN: "Do you have any pain?" "How bad is the pain?"  (e g , Scale 1-10; mild, moderate, or severe)    - MILD (1-3): doesn't interfere with normal activities, abdomen soft and not tender to touch     - MODERATE (4-7): interferes with normal activities or awakens from sleep, tender to touch     - SEVERE (8-10): excruciating pain, doubled over, unable to do any normal activities       2/10     6  PREGNANCY: "Could you be pregnant?" "Are you sexually active?" "Did you recently give birth?"      Denies     7  BREASTFEEDING: "Are you breastfeeding?"      Denies     8  HORMONES: "Are you taking any hormone medications, prescription or OTC?" (e g , birth control pills, estrogen)      Denies     9   BLOOD THINNERS: "Do you take any blood thinners?" (e g , Coumadin/warfarin, Pradaxa/dabigatran, aspirin)      Denies     10  CAUSE: "What do you think is causing the bleeding?" (e g , recent gyn surgery, recent gyn procedure; known bleeding disorder, cervical cancer, polycystic ovarian disease, fibroids)          Recent IUD removal 6/1/21     11  HEMODYNAMIC STATUS: "Are you weak or feeling lightheaded?" If so, ask: "Can you stand and walk normally?"         Denies and can walk normally     12  OTHER SYMPTOMS: "What other symptoms are you having with the bleeding?" (e g , passed tissue, vaginal discharge, fever, menstrual-type cramps)        Denies     On Tuesday, 6/1 IUD was removed and patient being treated for BV      Protocols used: VAGINAL BLEEDING - ABNORMAL-ADULT-AH

## 2021-06-05 NOTE — TELEPHONE ENCOUNTER
[facility] sent to on-call provider, Dr Karen Lin, with patient's symptoms of severe bleeding following IUD removal on 6/1/21  Per Dr Karen Lin, It's not unusual to have bleeding however hers sounds excessive  She should try and stay off of her feet but if heavy bleeding persists, then she should go to the ED  Made patient aware and verbalized understanding

## 2021-06-05 NOTE — TELEPHONE ENCOUNTER
Regarding: IUD Removed - Blood Clots  ----- Message from Sierra Jacinto sent at 6/5/2021  4:01 PM EDT -----  "I had an IUD removed this week and I'm still passing large blood clots  I'm getting very concerned   It's accompanied by occasional, light cramping "

## 2021-06-07 NOTE — TELEPHONE ENCOUNTER
Pt states feeling better, denies increased bleeding or pelvic pain  Pt is going to continue to monitor and contact office with any concerns with s/s

## 2021-07-09 DIAGNOSIS — N76.0 BV (BACTERIAL VAGINOSIS): Primary | ICD-10-CM

## 2021-07-09 DIAGNOSIS — B96.89 BV (BACTERIAL VAGINOSIS): Primary | ICD-10-CM

## 2021-07-09 RX ORDER — METRONIDAZOLE 7.5 MG/G
1 GEL VAGINAL
Qty: 70 G | Refills: 0 | Status: SHIPPED | OUTPATIENT
Start: 2021-07-09 | End: 2021-07-14

## 2021-07-09 NOTE — TELEPHONE ENCOUNTER
Pt called to get another medication for her BV  Pt states she took the abx Cleocin 6/1/21 which helped but symptoms are still there  Pt would prefer not to take Flagyl and would like the Metrogel since that has worked for her in the past  Pt takes a daily probiotic and currently is taking twice daily - Am and PM  Pt would prefer not to schedule another apt at this time since her symptoms are the same since being seen on 6/1/21

## 2021-07-11 ENCOUNTER — NURSE TRIAGE (OUTPATIENT)
Dept: OTHER | Facility: OTHER | Age: 41
End: 2021-07-11

## 2021-07-11 NOTE — TELEPHONE ENCOUNTER
Dr Alicia Acuña on call was made aware of patient's symptoms  Per Dr Alicia Acuña, to continue Metrogel, to call office tomorrow to follow up  Patient agreeable with advice, verbalized understanding

## 2021-07-11 NOTE — TELEPHONE ENCOUNTER
Regarding: vaginal yeast infection  ----- Message from Celia Kent sent at 7/11/2021  9:12 AM EDT -----  "I was given metrogel on Friday and today I woke up with a yeast infection "

## 2021-07-11 NOTE — TELEPHONE ENCOUNTER
Reason for Disposition   4 or more episodes of vaginal infection in past year    Additional Information   Negative: [1] Symptoms of a "yeast infection" (i e , itchy, white discharge, not bad smelling) AND [2] not improved > 3 days following CARE ADVICE    Answer Assessment - Initial Assessment Questions  1  INFECTION: "What infection is the antibiotic being given for?"      Vaginal infection   2  ANTIBIOTIC: "What antibiotic are you taking" "How many times per day?"      Metrogel   3  DURATION: "When was the antibiotic started?"      7/9/2021   4  MAIN CONCERN OR SYMPTOM:  "What is your main concern right now?"      Yeast infection   5  BETTER-SAME-WORSE: "Are you getting better, staying the same, or getting worse compared to when you first started the antibiotics?" If getting worse, ask: "In what way?"       Worse   6  FEVER: "Do you have a fever?" If Yes, ask: "What is your temperature, how was it measured, and when did it start?"      No   7  SYMPTOMS: "Are there any other symptoms you're concerned about?" If Yes, ask: "When did it start?"      Thick white vaginal discharge, possible yeast infection     8  FOLLOW-UP APPOINTMENT: "Do you have a follow-up appointment with your doctor?"       No    Protocols used: VAGINAL DISCHARGE-ADULT-, INFECTION ON ANTIBIOTIC FOLLOW-UP CALL-ADULT-

## 2021-07-12 ENCOUNTER — TELEPHONE (OUTPATIENT)
Dept: OBGYN CLINIC | Facility: CLINIC | Age: 41
End: 2021-07-12

## 2021-07-12 NOTE — TELEPHONE ENCOUNTER
Pt LM states started metrogel Friday and on Saturday woke up with a horrible yeast infection  Pt requesting diflucan to be prescribed  Pt believes her menstrual cycle will start on Wednesday

## 2021-07-12 NOTE — TELEPHONE ENCOUNTER
Reviewed with pt Rx sent to pharmacy  Pt aware to contact office if symptoms do not improve or if after her menstrual cycle she is still feeling discomfort to contact office

## 2021-09-09 ENCOUNTER — PROCEDURE VISIT (OUTPATIENT)
Dept: OBGYN CLINIC | Facility: CLINIC | Age: 41
End: 2021-09-09
Payer: COMMERCIAL

## 2021-09-09 VITALS
DIASTOLIC BLOOD PRESSURE: 68 MMHG | WEIGHT: 149 LBS | BODY MASS INDEX: 23.95 KG/M2 | SYSTOLIC BLOOD PRESSURE: 102 MMHG | HEIGHT: 66 IN

## 2021-09-09 DIAGNOSIS — N92.0 MENORRHAGIA WITH REGULAR CYCLE: Primary | ICD-10-CM

## 2021-09-09 PROBLEM — N92.1 MENORRHAGIA WITH IRREGULAR CYCLE: Status: RESOLVED | Noted: 2021-06-01 | Resolved: 2021-09-09

## 2021-09-09 PROCEDURE — 99213 OFFICE O/P EST LOW 20 MIN: CPT | Performed by: OBSTETRICS & GYNECOLOGY

## 2021-09-09 NOTE — PROGRESS NOTES
Endometrial biopsy    Date/Time: 9/9/2021 8:42 AM  Performed by: Ariela Yeung MD  Authorized by: Ariela Yeung MD

## 2021-09-09 NOTE — PROGRESS NOTES
Assessment/Plan:     menorrhagia with request for endometrial ablation  Procedure was reviewed, risks benefits and anticipated postoperative course, consent was signed   H&P completed  Proceed with hysteroscopy with dilation curettage and endometrial ablation as scheduled       Problem List Items Addressed This Visit        Other    Menorrhagia with regular cycle - Primary            Subjective:      Patient ID: Isidro Olivares is a 35 y o  female  Madison Spikes is here today for her preop visit  She is scheduled for a hysteroscopy with D&C and endometrial ablation due to menorrhagia  She had tried the Mirena previously to assist with her extremely heavy periods but it was not a success  She had a Pap being removed and having significantly heavy bleeding after  She was also to have endometrial biopsy but secondary the difficulty with that procedure it was not done  She is aware that pathology may return after her D&C that would require her to have further workup and evaluation  She had been in previously when we had reviewed all options, prior to her choosing to try the Mirena  We did spend time reviewing the risks and benefits of ablation  We discussed it again today  Reviewing the risks and benefits and anticipated postoperative course  She is interested in having it performed  Reviewed the consent and it was signed  We completed her H and P  The following portions of the patient's history were reviewed and updated as appropriate:   She  has a past medical history of A-fib ([address] )    She   Patient Active Problem List    Diagnosis Date Noted    Bacterial vaginosis 06/01/2021    Menorrhagia with irregular cycle 06/01/2021    Encounter for insertion of mirena IUD 04/05/2021    Vaginal discharge 02/19/2021    Menorrhagia with regular cycle 05/22/2019    Other chest pain 07/30/2018    Mild vitamin D deficiency 05/08/2017    Abnormal weight gain 03/13/2017    Feeling tired 03/13/2017    Right knee pain 2017    Thyroid nodule 2017    Paroxysmal atrial fibrillation ([facility] [address] ) 2015     She  has a past surgical history that includes  section and pr thyroid lobectomy,unilat (Right, 2017)  Her family history includes Breast cancer in her maternal aunt and maternal grandmother; Thyroid disease in her mother  She  reports that she has never smoked  She has never used smokeless tobacco  She reports current alcohol use  She reports that she does not use drugs  Current Outpatient Medications   Medication Sig Dispense Refill    escitalopram (LEXAPRO) 5 mg tablet Take 5 mg by mouth daily       No current facility-administered medications for this visit  Current Outpatient Medications on File Prior to Visit   Medication Sig    escitalopram (LEXAPRO) 5 mg tablet Take 5 mg by mouth daily    [DISCONTINUED] levonorgestrel (MIRENA) 20 MCG/24HR IUD 1 each by Intrauterine route     No current facility-administered medications on file prior to visit  She has No Known Allergies       Review of Systems   Constitutional: Negative for chills, fatigue, fever and unexpected weight change  HENT: Negative for dental problem, sinus pressure and sinus pain  Eyes: Negative for visual disturbance  Respiratory: Negative for cough, shortness of breath and wheezing  Cardiovascular: Negative for chest pain and leg swelling  Gastrointestinal: Negative for constipation, diarrhea, nausea and vomiting  Genitourinary: Negative for urgency  Musculoskeletal: Negative for back pain and joint swelling  Allergic/Immunologic: Negative for environmental allergies  Neurological: Negative for dizziness and headaches  Psychiatric/Behavioral: The patient is not nervous/anxious            Objective:      /68 (BP Location: Left arm, Patient Position: Sitting, Cuff Size: Standard)   Ht 5' 6" (1 676 m)   Wt 67 6 kg (149 lb)   LMP 2021 (Exact Date)   BMI 24 05 kg/m²          Physical Exam  Constitutional:       General: She is not in acute distress  Appearance: Normal appearance  She is normal weight  She is not ill-appearing  Comments: Young white female   HENT:      Head: Normocephalic and atraumatic  Cardiovascular:      Rate and Rhythm: Normal rate and regular rhythm  Pulses: Normal pulses  Heart sounds: Normal heart sounds  No murmur heard  Pulmonary:      Effort: Pulmonary effort is normal  No respiratory distress  Breath sounds: Normal breath sounds  No wheezing  Abdominal:      General: Abdomen is flat  There is no distension  Palpations: Abdomen is soft  There is no mass  Tenderness: There is no abdominal tenderness  There is no guarding or rebound  Hernia: No hernia is present  Genitourinary:     Comments: Normal female no vulvar or vaginal lesions, cervix is difficult to visualize but after close inspection appears grossly normal appearance  Bimanual exam is normal  And nontender  Musculoskeletal:         General: Normal range of motion  Cervical back: Normal range of motion  Skin:     General: Skin is warm and dry  Neurological:      General: No focal deficit present  Mental Status: She is alert and oriented to person, place, and time     Psychiatric:         Mood and Affect: Mood normal

## 2021-09-10 LAB
ALBUMIN SERPL-MCNC: 4.5 G/DL (ref 3.6–5.1)
ALBUMIN/GLOB SERPL: 1.6 (CALC) (ref 1–2.5)
ALP SERPL-CCNC: 41 U/L (ref 31–125)
ALT SERPL-CCNC: 8 U/L (ref 6–29)
AST SERPL-CCNC: 12 U/L (ref 10–30)
BASOPHILS # BLD AUTO: 117 CELLS/UL (ref 0–200)
BASOPHILS NFR BLD AUTO: 1.7 %
BILIRUB SERPL-MCNC: 0.3 MG/DL (ref 0.2–1.2)
BUN SERPL-MCNC: 10 MG/DL (ref 7–25)
BUN/CREAT SERPL: NORMAL (CALC) (ref 6–22)
CALCIUM SERPL-MCNC: 9.5 MG/DL (ref 8.6–10.2)
CHLORIDE SERPL-SCNC: 103 MMOL/L (ref 98–110)
CO2 SERPL-SCNC: 27 MMOL/L (ref 20–32)
CREAT SERPL-MCNC: 0.62 MG/DL (ref 0.5–1.1)
EOSINOPHIL # BLD AUTO: 455 CELLS/UL (ref 15–500)
EOSINOPHIL NFR BLD AUTO: 6.6 %
ERYTHROCYTE [DISTWIDTH] IN BLOOD BY AUTOMATED COUNT: 15.6 % (ref 11–15)
GLOBULIN SER CALC-MCNC: 2.8 G/DL (CALC) (ref 1.9–3.7)
GLUCOSE SERPL-MCNC: 93 MG/DL (ref 65–99)
HCT VFR BLD AUTO: 33.2 % (ref 35–45)
HGB BLD-MCNC: 10 G/DL (ref 11.7–15.5)
LYMPHOCYTES # BLD AUTO: 2001 CELLS/UL (ref 850–3900)
LYMPHOCYTES NFR BLD AUTO: 29 %
MCH RBC QN AUTO: 22.1 PG (ref 27–33)
MCHC RBC AUTO-ENTMCNC: 30.1 G/DL (ref 32–36)
MCV RBC AUTO: 73.5 FL (ref 80–100)
MONOCYTES # BLD AUTO: 780 CELLS/UL (ref 200–950)
MONOCYTES NFR BLD AUTO: 11.3 %
NEUTROPHILS # BLD AUTO: 3547 CELLS/UL (ref 1500–7800)
NEUTROPHILS NFR BLD AUTO: 51.4 %
PLATELET # BLD AUTO: 304 THOUSAND/UL (ref 140–400)
PMV BLD REES-ECKER: 11.2 FL (ref 7.5–12.5)
POTASSIUM SERPL-SCNC: 4.5 MMOL/L (ref 3.5–5.3)
PROT SERPL-MCNC: 7.3 G/DL (ref 6.1–8.1)
RBC # BLD AUTO: 4.52 MILLION/UL (ref 3.8–5.1)
SL AMB EGFR AFRICAN AMERICAN: 128 ML/MIN/1.73M2
SL AMB EGFR NON AFRICAN AMERICAN: 110 ML/MIN/1.73M2
SODIUM SERPL-SCNC: 137 MMOL/L (ref 135–146)
TSH SERPL-ACNC: 4.3 MIU/L
WBC # BLD AUTO: 6.9 THOUSAND/UL (ref 3.8–10.8)

## 2021-09-17 ENCOUNTER — TELEPHONE (OUTPATIENT)
Dept: OBGYN CLINIC | Facility: CLINIC | Age: 41
End: 2021-09-17

## 2021-09-17 NOTE — TELEPHONE ENCOUNTER
Submitted via fax to [phone] per automated message  Faxed through [address] [place] [address] Rd with clinicals  Per automated system will receive confirmation of received request in 48 hours

## 2021-09-24 NOTE — TELEPHONE ENCOUNTER
OP CODES 21610, [id number] AUTH# [id number] VALID 9/24/21-10/24/21 @ [facility] PER BOBO C  @ [facility]   REF# [id number]

## 2021-09-27 ENCOUNTER — ANESTHESIA EVENT (OUTPATIENT)
Dept: PERIOP | Facility: HOSPITAL | Age: 41
End: 2021-09-27
Payer: COMMERCIAL

## 2021-09-27 NOTE — ANESTHESIA PREPROCEDURE EVALUATION
Procedure:  DILATATION AND CURETTAGE (D&C) WITH HYSTEROSCOPY (N/A Uterus)  ABLATION ENDOMETRIAL VIVIANA (N/A Uterus)    Relevant Problems   ANESTHESIA (within normal limits)      CARDIO   (+) Other chest pain   (+) Paroxysmal atrial fibrillation (HCC) (pregnancy-induced, during pregnancy only)      ENDO (within normal limits)      GI/HEPATIC (within normal limits)      /RENAL (within normal limits)      HEMATOLOGY (within normal limits)      NEURO/PSYCH (within normal limits)      PULMONARY (within normal limits)      Other   (+) Thyroid nodule      EKG 7/2018:  Sinus rhythm   60 beats per minute   Poor anterior R-wave progression   Low voltage   No change when compared to prior  TTE 11/2014:  LEFT VENTRICLE:   Systolic function was at the lower limits of normal  Ejection fraction was estimated to be 53 %  Left ventricular segmental and global longitudinal  strain was within normal limits  There were no regional wall motion abnormalities  There was no evidence of concentric hypertrophy  TRICUSPID VALVE:   There was trace regurgitation  PULMONIC VALVE:   There was trace regurgitation  Lab Results   Component Value Date    WBC 6 9 09/10/2021    HGB 10 0 (L) 09/10/2021    HCT 33 2 (L) 09/10/2021    MCV 73 5 (L) 09/10/2021     09/10/2021     Lab Results   Component Value Date    SODIUM 137 09/10/2021    K 4 5 09/10/2021     09/10/2021    CO2 27 09/10/2021    BUN 10 09/10/2021    CREATININE 0 62 09/10/2021    GLUC 93 09/10/2021    CALCIUM 9 5 09/10/2021     Lab Results   Component Value Date    INR 0 99 11/08/2014    PROTIME 12 5 11/08/2014     No results found for: HGBA1C       Physical Exam    Airway    Mallampati score: I  TM Distance: >3 FB  Neck ROM: full     Dental   No notable dental hx     Cardiovascular  Cardiovascular exam normal    Pulmonary  Pulmonary exam normal     Other Findings        Anesthesia Plan  ASA Score- 2     Anesthesia Type- general with ASA Monitors  Additional Monitors:   Airway Plan: LMA  Plan Factors-Exercise tolerance (METS): >4 METS  Chart reviewed  Patient summary reviewed  Induction- intravenous  Postoperative Plan-     Informed Consent- Anesthetic plan and risks discussed with patient  I personally reviewed this patient with the CRNA  Discussed and agreed on the Anesthesia Plan with the CRNA  Romy Hernandez

## 2021-09-28 ENCOUNTER — ANESTHESIA (OUTPATIENT)
Dept: PERIOP | Facility: HOSPITAL | Age: 41
End: 2021-09-28
Payer: COMMERCIAL

## 2021-09-28 ENCOUNTER — HOSPITAL ENCOUNTER (OUTPATIENT)
Facility: HOSPITAL | Age: 41
Setting detail: OUTPATIENT SURGERY
Discharge: HOME | End: 2021-09-28
Attending: OBSTETRICS & GYNECOLOGY | Admitting: OBSTETRICS & GYNECOLOGY
Payer: COMMERCIAL

## 2021-09-28 VITALS
DIASTOLIC BLOOD PRESSURE: 50 MMHG | TEMPERATURE: 98.1 F | HEART RATE: 57 BPM | WEIGHT: 140 LBS | HEIGHT: 66 IN | OXYGEN SATURATION: 100 % | BODY MASS INDEX: 22.5 KG/M2 | SYSTOLIC BLOOD PRESSURE: 103 MMHG | RESPIRATION RATE: 18 BRPM

## 2021-09-28 DIAGNOSIS — N92.1 MENORRHAGIA WITH IRREGULAR CYCLE: ICD-10-CM

## 2021-09-28 DIAGNOSIS — Z98.890 S/P ENDOMETRIAL ABLATION: Primary | ICD-10-CM

## 2021-09-28 LAB
EXT PREGNANCY TEST URINE: NEGATIVE
EXT. CONTROL: NORMAL

## 2021-09-28 PROCEDURE — 58563 HYSTEROSCOPY ABLATION: CPT | Performed by: OBSTETRICS & GYNECOLOGY

## 2021-09-28 PROCEDURE — 88305 TISSUE EXAM BY PATHOLOGIST: CPT | Performed by: PATHOLOGY

## 2021-09-28 PROCEDURE — 81025 URINE PREGNANCY TEST: CPT | Performed by: OBSTETRICS & GYNECOLOGY

## 2021-09-28 RX ORDER — IBUPROFEN 600 MG/1
600 TABLET ORAL EVERY 6 HOURS PRN
Status: DISCONTINUED | OUTPATIENT
Start: 2021-09-28 | End: 2021-09-28 | Stop reason: HOSPADM

## 2021-09-28 RX ORDER — HYDROMORPHONE HCL IN WATER/PF 6 MG/30 ML
0.2 PATIENT CONTROLLED ANALGESIA SYRINGE INTRAVENOUS
Status: DISCONTINUED | OUTPATIENT
Start: 2021-09-28 | End: 2021-09-28 | Stop reason: HOSPADM

## 2021-09-28 RX ORDER — LIDOCAINE HYDROCHLORIDE 10 MG/ML
0.5 INJECTION, SOLUTION EPIDURAL; INFILTRATION; INTRACAUDAL; PERINEURAL ONCE AS NEEDED
Status: COMPLETED | OUTPATIENT
Start: 2021-09-28 | End: 2021-09-28

## 2021-09-28 RX ORDER — SODIUM CHLORIDE, SODIUM LACTATE, POTASSIUM CHLORIDE, CALCIUM CHLORIDE 600; 310; 30; 20 MG/100ML; MG/100ML; MG/100ML; MG/100ML
INJECTION, SOLUTION INTRAVENOUS CONTINUOUS PRN
Status: DISCONTINUED | OUTPATIENT
Start: 2021-09-28 | End: 2021-09-28

## 2021-09-28 RX ORDER — FENTANYL CITRATE/PF 50 MCG/ML
50 SYRINGE (ML) INJECTION
Status: DISCONTINUED | OUTPATIENT
Start: 2021-09-28 | End: 2021-09-28 | Stop reason: HOSPADM

## 2021-09-28 RX ORDER — PROPOFOL 10 MG/ML
INJECTION, EMULSION INTRAVENOUS AS NEEDED
Status: DISCONTINUED | OUTPATIENT
Start: 2021-09-28 | End: 2021-09-28

## 2021-09-28 RX ORDER — KETOROLAC TROMETHAMINE 30 MG/ML
INJECTION, SOLUTION INTRAMUSCULAR; INTRAVENOUS AS NEEDED
Status: DISCONTINUED | OUTPATIENT
Start: 2021-09-28 | End: 2021-09-28

## 2021-09-28 RX ORDER — ACETAMINOPHEN 325 MG/1
650 TABLET ORAL EVERY 6 HOURS PRN
Status: DISCONTINUED | OUTPATIENT
Start: 2021-09-28 | End: 2021-09-28 | Stop reason: HOSPADM

## 2021-09-28 RX ORDER — ONDANSETRON 2 MG/ML
INJECTION INTRAMUSCULAR; INTRAVENOUS AS NEEDED
Status: DISCONTINUED | OUTPATIENT
Start: 2021-09-28 | End: 2021-09-28

## 2021-09-28 RX ORDER — OXYCODONE HYDROCHLORIDE 5 MG/1
5 TABLET ORAL EVERY 4 HOURS PRN
Status: DISCONTINUED | OUTPATIENT
Start: 2021-09-28 | End: 2021-09-28 | Stop reason: HOSPADM

## 2021-09-28 RX ORDER — ACETAMINOPHEN 325 MG/1
975 TABLET ORAL ONCE
Status: DISCONTINUED | OUTPATIENT
Start: 2021-09-28 | End: 2021-09-28 | Stop reason: HOSPADM

## 2021-09-28 RX ORDER — LIDOCAINE HYDROCHLORIDE 10 MG/ML
INJECTION, SOLUTION EPIDURAL; INFILTRATION; INTRACAUDAL; PERINEURAL AS NEEDED
Status: DISCONTINUED | OUTPATIENT
Start: 2021-09-28 | End: 2021-09-28

## 2021-09-28 RX ORDER — CEFAZOLIN SODIUM 2 G/50ML
2000 SOLUTION INTRAVENOUS ONCE
Status: DISCONTINUED | OUTPATIENT
Start: 2021-09-28 | End: 2021-09-28 | Stop reason: HOSPADM

## 2021-09-28 RX ORDER — IBUPROFEN 600 MG/1
600 TABLET ORAL EVERY 6 HOURS PRN
Qty: 30 TABLET | Refills: 0 | Status: SHIPPED | OUTPATIENT
Start: 2021-09-28

## 2021-09-28 RX ORDER — ACETAMINOPHEN 325 MG/1
975 TABLET ORAL ONCE
Status: COMPLETED | OUTPATIENT
Start: 2021-09-28 | End: 2021-09-28

## 2021-09-28 RX ORDER — FENTANYL CITRATE 50 UG/ML
INJECTION, SOLUTION INTRAMUSCULAR; INTRAVENOUS AS NEEDED
Status: DISCONTINUED | OUTPATIENT
Start: 2021-09-28 | End: 2021-09-28

## 2021-09-28 RX ORDER — SENNOSIDES 8.6 MG
650 CAPSULE ORAL EVERY 8 HOURS PRN
Qty: 30 TABLET | Refills: 0 | Status: SHIPPED | OUTPATIENT
Start: 2021-09-28

## 2021-09-28 RX ORDER — MIDAZOLAM HYDROCHLORIDE 2 MG/2ML
INJECTION, SOLUTION INTRAMUSCULAR; INTRAVENOUS AS NEEDED
Status: DISCONTINUED | OUTPATIENT
Start: 2021-09-28 | End: 2021-09-28

## 2021-09-28 RX ORDER — SODIUM CHLORIDE, SODIUM LACTATE, POTASSIUM CHLORIDE, CALCIUM CHLORIDE 600; 310; 30; 20 MG/100ML; MG/100ML; MG/100ML; MG/100ML
125 INJECTION, SOLUTION INTRAVENOUS CONTINUOUS
Status: DISCONTINUED | OUTPATIENT
Start: 2021-09-28 | End: 2021-09-28 | Stop reason: HOSPADM

## 2021-09-28 RX ORDER — OXYCODONE HYDROCHLORIDE 5 MG/1
10 TABLET ORAL EVERY 4 HOURS PRN
Status: DISCONTINUED | OUTPATIENT
Start: 2021-09-28 | End: 2021-09-28 | Stop reason: HOSPADM

## 2021-09-28 RX ORDER — DEXAMETHASONE SODIUM PHOSPHATE 10 MG/ML
INJECTION, SOLUTION INTRAMUSCULAR; INTRAVENOUS AS NEEDED
Status: DISCONTINUED | OUTPATIENT
Start: 2021-09-28 | End: 2021-09-28

## 2021-09-28 RX ORDER — CEFAZOLIN SODIUM 1 G/3ML
INJECTION, POWDER, FOR SOLUTION INTRAMUSCULAR; INTRAVENOUS AS NEEDED
Status: DISCONTINUED | OUTPATIENT
Start: 2021-09-28 | End: 2021-09-28

## 2021-09-28 RX ORDER — GABAPENTIN 100 MG/1
100 CAPSULE ORAL ONCE
Status: COMPLETED | OUTPATIENT
Start: 2021-09-28 | End: 2021-09-28

## 2021-09-28 RX ADMIN — SODIUM CHLORIDE, SODIUM LACTATE, POTASSIUM CHLORIDE, AND CALCIUM CHLORIDE 125 ML/HR: .6; .31; .03; .02 INJECTION, SOLUTION INTRAVENOUS at 11:24

## 2021-09-28 RX ADMIN — LIDOCAINE HYDROCHLORIDE 4 ML: 10 INJECTION, SOLUTION EPIDURAL; INFILTRATION; INTRACAUDAL; PERINEURAL at 12:32

## 2021-09-28 RX ADMIN — SODIUM CHLORIDE, SODIUM LACTATE, POTASSIUM CHLORIDE, AND CALCIUM CHLORIDE: .6; .31; .03; .02 INJECTION, SOLUTION INTRAVENOUS at 13:01

## 2021-09-28 RX ADMIN — SODIUM CHLORIDE, SODIUM LACTATE, POTASSIUM CHLORIDE, AND CALCIUM CHLORIDE: .6; .31; .03; .02 INJECTION, SOLUTION INTRAVENOUS at 12:27

## 2021-09-28 RX ADMIN — CEFAZOLIN 2000 MG: 1 INJECTION, POWDER, FOR SOLUTION INTRAMUSCULAR; INTRAVENOUS at 12:35

## 2021-09-28 RX ADMIN — OXYCODONE HYDROCHLORIDE 5 MG: 5 TABLET ORAL at 14:35

## 2021-09-28 RX ADMIN — ACETAMINOPHEN 975 MG: 325 TABLET ORAL at 10:49

## 2021-09-28 RX ADMIN — IBUPROFEN 600 MG: 600 TABLET, FILM COATED ORAL at 15:42

## 2021-09-28 RX ADMIN — FENTANYL CITRATE 50 MCG: 50 INJECTION INTRAMUSCULAR; INTRAVENOUS at 12:43

## 2021-09-28 RX ADMIN — DEXAMETHASONE SODIUM PHOSPHATE 10 MG: 10 INJECTION, SOLUTION INTRAMUSCULAR; INTRAVENOUS at 12:45

## 2021-09-28 RX ADMIN — LIDOCAINE HYDROCHLORIDE 0.5 ML: 10 INJECTION, SOLUTION EPIDURAL; INFILTRATION; INTRACAUDAL; PERINEURAL at 11:25

## 2021-09-28 RX ADMIN — Medication 50 MCG: at 13:53

## 2021-09-28 RX ADMIN — PROPOFOL 60 MG: 10 INJECTION, EMULSION INTRAVENOUS at 12:34

## 2021-09-28 RX ADMIN — KETOROLAC TROMETHAMINE 30 MG: 30 INJECTION, SOLUTION INTRAMUSCULAR at 13:02

## 2021-09-28 RX ADMIN — Medication 50 MCG: at 13:29

## 2021-09-28 RX ADMIN — ONDANSETRON 4 MG: 2 INJECTION INTRAMUSCULAR; INTRAVENOUS at 12:45

## 2021-09-28 RX ADMIN — PROPOFOL 200 MG: 10 INJECTION, EMULSION INTRAVENOUS at 12:33

## 2021-09-28 RX ADMIN — MIDAZOLAM 2 MG: 1 INJECTION INTRAMUSCULAR; INTRAVENOUS at 12:24

## 2021-09-28 RX ADMIN — GABAPENTIN 100 MG: 100 CAPSULE ORAL at 10:49

## 2021-09-28 NOTE — ANESTHESIA POSTPROCEDURE EVALUATION
Post-Op Assessment Note    CV Status:  Stable  Pain Score: 0    Pain management: adequate     Mental Status:  Sleepy and arousable   Hydration Status:  Euvolemic   PONV Controlled:  Controlled   Airway Patency:  Patent      Post Op Vitals Reviewed: Yes      Staff: Anesthesiologist, CRNA         No complications documented      BP   125/59   Temp      Pulse  67   Resp   16   SpO2   100

## 2021-09-30 ENCOUNTER — TELEPHONE (OUTPATIENT)
Dept: OBGYN CLINIC | Facility: CLINIC | Age: 41
End: 2021-09-30

## 2021-09-30 NOTE — TELEPHONE ENCOUNTER
Called to check on patient post op, doing well and feels great  Aware to call if needs anything  Otherwise will be in touch when her results are back

## 2021-10-12 ENCOUNTER — TELEPHONE (OUTPATIENT)
Dept: OBGYN CLINIC | Facility: CLINIC | Age: 41
End: 2021-10-12

## 2021-11-30 ENCOUNTER — TELEPHONE (OUTPATIENT)
Dept: OBGYN CLINIC | Facility: CLINIC | Age: 41
End: 2021-11-30

## 2021-12-01 ENCOUNTER — TELEPHONE (OUTPATIENT)
Dept: OBGYN CLINIC | Facility: CLINIC | Age: 41
End: 2021-12-01

## 2021-12-23 DIAGNOSIS — B37.3 VAGINAL YEAST INFECTION: Primary | ICD-10-CM

## 2021-12-23 RX ORDER — FLUCONAZOLE 150 MG/1
150 TABLET ORAL EVERY OTHER DAY
Qty: 2 TABLET | Refills: 0 | Status: SHIPPED | OUTPATIENT
Start: 2021-12-23 | End: 2021-12-26

## 2022-01-10 DIAGNOSIS — N76.0 BV (BACTERIAL VAGINOSIS): Primary | ICD-10-CM

## 2022-01-10 DIAGNOSIS — B96.89 BV (BACTERIAL VAGINOSIS): Primary | ICD-10-CM

## 2022-01-10 RX ORDER — METRONIDAZOLE 7.5 MG/G
1 GEL VAGINAL
Qty: 70 G | Refills: 0 | Status: SHIPPED | OUTPATIENT
Start: 2022-01-10 | End: 2022-01-15

## 2022-01-10 NOTE — TELEPHONE ENCOUNTER
LMOM with details - rx sent for Metrogel and if symptoms do not improve will need an apt to be seen

## 2022-01-10 NOTE — TELEPHONE ENCOUNTER
Pt LM would like to have Metrogel called in for BV  Pt aware we would like to see her but she would rather not be seen in the office since she has sick kids at home and would not like to come to the office if she can prevent  Pt would like to know if she can have an Rx for Diflucan after using the metrogel  Pt was treating with Diflucan 12/23/21  Pt has not been since her D&C 9/28/21 with Dr Aure Perea  Message sent to Sanya Hwang for review

## 2022-01-12 ENCOUNTER — TELEPHONE (OUTPATIENT)
Dept: OBGYN CLINIC | Facility: CLINIC | Age: 42
End: 2022-01-12

## 2022-01-12 NOTE — TELEPHONE ENCOUNTER
Pt is day 2 taking her metrogel and she has developed a yeast inf  Wants to know if she can use diflucan and when she should start taking it?

## 2022-01-13 DIAGNOSIS — B37.3 YEAST VAGINITIS: Primary | ICD-10-CM

## 2022-01-13 RX ORDER — FLUCONAZOLE 150 MG/1
150 TABLET ORAL EVERY OTHER DAY
Qty: 2 TABLET | Refills: 0 | Status: SHIPPED | OUTPATIENT
Start: 2022-01-13 | End: 2022-01-16

## 2022-01-13 NOTE — TELEPHONE ENCOUNTER
Patient unable to make apt today at 3pm, kids get off the bus at that time  No other apts available this week or next  States her discharge is not cottage cheese like  States last time this happened when she used the [employer]  Patient states is very disgusting and wants to do something about it before the weekend  The patient is already doing probiotics as well

## 2022-02-23 ENCOUNTER — OFFICE VISIT (OUTPATIENT)
Dept: OBGYN CLINIC | Facility: CLINIC | Age: 42
End: 2022-02-23
Payer: COMMERCIAL

## 2022-02-23 VITALS
SYSTOLIC BLOOD PRESSURE: 118 MMHG | BODY MASS INDEX: 23.95 KG/M2 | HEIGHT: 66 IN | WEIGHT: 149 LBS | DIASTOLIC BLOOD PRESSURE: 74 MMHG

## 2022-02-23 DIAGNOSIS — N89.8 VAGINAL DISCHARGE: Primary | ICD-10-CM

## 2022-02-23 PROBLEM — J01.80 OTHER ACUTE SINUSITIS: Status: ACTIVE | Noted: 2022-01-11

## 2022-02-23 PROBLEM — R19.7 DIARRHEA OF PRESUMED INFECTIOUS ORIGIN: Status: ACTIVE | Noted: 2021-06-24

## 2022-02-23 PROCEDURE — 87070 CULTURE OTHR SPECIMN AEROBIC: CPT | Performed by: PHYSICIAN ASSISTANT

## 2022-02-23 PROCEDURE — 99213 OFFICE O/P EST LOW 20 MIN: CPT | Performed by: PHYSICIAN ASSISTANT

## 2022-02-23 NOTE — PROGRESS NOTES
Assessment/Plan:    Vaginal discharge  Reassured normal exam today  Genital culture done  For prevention: Recommend acidophilus or yogurt daily, avoid irritating soaps or lotions, no tight fitted pants or underwear, avoid bubble baths, do not stay in wet swimsuit  Office will call with results and appropriate follow up  Problem List Items Addressed This Visit        Other    Vaginal discharge - Primary     Reassured normal exam today  Genital culture done  For prevention: Recommend acidophilus or yogurt daily, avoid irritating soaps or lotions, no tight fitted pants or underwear, avoid bubble baths, do not stay in wet swimsuit  Office will call with results and appropriate follow up  Relevant Orders    Genital Comprehensive Culture (Completed)            Subjective:      Patient ID: Juliet Barrios is a 38 y o  female  HPI  34 yo seen for vaginal discharge and irritation  Feels some irritation off and on for the past few months  Sometimes will have clear discharge  Feels like different since uterine ablation in 9/2021  Denies pelvic pain, fever, chills, bowel or bladder issues  No STD concerns  Has been getting light menses  Will use a light regular tampon on the first day  Not having symptoms currently  The following portions of the patient's history were reviewed and updated as appropriate:   She  has a past medical history of A-fib ([address] ), Menorrhagia, and Vitamin D deficiency    She   Patient Active Problem List    Diagnosis Date Noted    Other acute sinusitis 01/11/2022    S/P endometrial ablation 09/28/2021    Diarrhea of presumed infectious origin 06/24/2021    Bacterial vaginosis 06/01/2021    Encounter for insertion of mirena IUD 04/05/2021    Vaginal discharge 02/19/2021    Menorrhagia with regular cycle 05/22/2019    Other chest pain 07/30/2018    Mild vitamin D deficiency 05/08/2017    Abnormal weight gain 03/13/2017    Feeling tired 03/13/2017    Right knee pain 2017    Thyroid nodule 2017    Paroxysmal atrial fibrillation ([facility] [address] ) 2015     She  has a past surgical history that includes  section; pr thyroid lobectomy,unilat (Right, 2017); pr hysteroscopy,w/endo bx (N/A, 2021); and pr hysteroscopy,w/endometrial ablation (N/A, 2021)  Her family history includes Breast cancer in her maternal aunt and maternal grandmother; Thyroid disease in her mother  She  reports that she has never smoked  She has never used smokeless tobacco  She reports current alcohol use  She reports that she does not use drugs  Current Outpatient Medications   Medication Sig Dispense Refill    acetaminophen (TYLENOL) 650 mg CR tablet Take 1 tablet (650 mg total) by mouth every 8 (eight) hours as needed for mild pain 30 tablet 0    ibuprofen (MOTRIN) 600 mg tablet Take 1 tablet (600 mg total) by mouth every 6 (six) hours as needed for mild pain 30 tablet 0    escitalopram (LEXAPRO) 5 mg tablet Take 5 mg by mouth daily at bedtime        No current facility-administered medications for this visit  She has No Known Allergies       Review of Systems   Constitutional: Negative for fatigue, fever and unexpected weight change  HENT: Negative for dental problem and sinus pressure  Eyes: Negative for visual disturbance  Respiratory: Negative for cough, shortness of breath and wheezing  Cardiovascular: Negative for chest pain  Gastrointestinal: Negative for abdominal pain, blood in stool, constipation, diarrhea, nausea and vomiting  Endocrine: Negative for polydipsia  Genitourinary: Positive for vaginal discharge  Negative for difficulty urinating, dyspareunia, dysuria, frequency, hematuria, pelvic pain and urgency  Musculoskeletal: Negative for arthralgias and back pain  Neurological: Negative for dizziness, seizures, light-headedness and headaches  Psychiatric/Behavioral: Negative for suicidal ideas   The patient is not nervous/anxious  Objective:      /74 (BP Location: Left arm, Patient Position: Sitting, Cuff Size: Standard)   Ht 5' 6" (1 676 m)   Wt 67 6 kg (149 lb)   LMP  (LMP Unknown)   BMI 24 05 kg/m²          Physical Exam  Constitutional:       Appearance: Normal appearance  She is well-developed  Neck:      Thyroid: No thyroid mass or thyromegaly  Cardiovascular:      Rate and Rhythm: Normal rate and regular rhythm  Heart sounds: Normal heart sounds  No murmur heard  No friction rub  No gallop  Pulmonary:      Effort: Pulmonary effort is normal  No respiratory distress  Breath sounds: Normal breath sounds  No wheezing or rales  Chest:   Breasts:      Right: No supraclavicular adenopathy  Left: No supraclavicular adenopathy  Abdominal:      General: Bowel sounds are normal  There is no distension  Palpations: Abdomen is soft  There is no mass  Tenderness: There is no abdominal tenderness  There is no guarding or rebound  Genitourinary:     Labia:         Right: No rash, tenderness, lesion or injury  Left: No rash, tenderness, lesion or injury  Vagina: No signs of injury  No vaginal discharge, erythema, tenderness or bleeding  Cervix: No cervical motion tenderness, discharge or friability  Uterus: Not deviated, not enlarged and not tender  Adnexa:         Right: No mass, tenderness or fullness  Left: No mass, tenderness or fullness  Musculoskeletal:      Cervical back: Neck supple  Lymphadenopathy:      Cervical: No cervical adenopathy  Upper Body:      Right upper body: No supraclavicular adenopathy  Left upper body: No supraclavicular adenopathy  Skin:     General: Skin is warm and dry  Coloration: Skin is not pale  Findings: No erythema or rash  Neurological:      Mental Status: She is alert and oriented to person, place, and time     Psychiatric:         Behavior: Behavior normal  Thought Content:  Thought content normal          Judgment: Judgment normal         Wet mount: No trichomonads, clue cells, yeast

## 2022-02-27 LAB — BACTERIA GENITAL AEROBE CULT: NORMAL

## 2022-03-09 NOTE — ASSESSMENT & PLAN NOTE
Reassured normal exam today  Genital culture done  For prevention: Recommend acidophilus or yogurt daily, avoid irritating soaps or lotions, no tight fitted pants or underwear, avoid bubble baths, do not stay in wet swimsuit  Office will call with results and appropriate follow up

## 2022-05-15 ENCOUNTER — APPOINTMENT (EMERGENCY)
Dept: RADIOLOGY | Facility: HOSPITAL | Age: 42
End: 2022-05-15
Payer: COMMERCIAL

## 2022-05-15 ENCOUNTER — HOSPITAL ENCOUNTER (EMERGENCY)
Facility: HOSPITAL | Age: 42
Discharge: HOME | End: 2022-05-15
Attending: EMERGENCY MEDICINE
Payer: COMMERCIAL

## 2022-05-15 VITALS
OXYGEN SATURATION: 100 % | SYSTOLIC BLOOD PRESSURE: 138 MMHG | DIASTOLIC BLOOD PRESSURE: 64 MMHG | HEIGHT: 66 IN | RESPIRATION RATE: 18 BRPM | HEART RATE: 80 BPM | WEIGHT: 150.79 LBS | BODY MASS INDEX: 24.23 KG/M2 | TEMPERATURE: 99.2 F

## 2022-05-15 DIAGNOSIS — S52.123A RADIAL HEAD FRACTURE: Primary | ICD-10-CM

## 2022-05-15 DIAGNOSIS — S52.133A RADIAL NECK FRACTURE: ICD-10-CM

## 2022-05-15 DIAGNOSIS — R11.0 NAUSEA: ICD-10-CM

## 2022-05-15 PROCEDURE — 73070 X-RAY EXAM OF ELBOW: CPT

## 2022-05-15 PROCEDURE — 73090 X-RAY EXAM OF FOREARM: CPT

## 2022-05-15 PROCEDURE — 99283 EMERGENCY DEPT VISIT LOW MDM: CPT

## 2022-05-15 PROCEDURE — 29105 APPLICATION LONG ARM SPLINT: CPT | Performed by: PHYSICIAN ASSISTANT

## 2022-05-15 PROCEDURE — 96372 THER/PROPH/DIAG INJ SC/IM: CPT

## 2022-05-15 PROCEDURE — 73060 X-RAY EXAM OF HUMERUS: CPT

## 2022-05-15 PROCEDURE — 99285 EMERGENCY DEPT VISIT HI MDM: CPT | Performed by: PHYSICIAN ASSISTANT

## 2022-05-15 RX ORDER — ONDANSETRON 4 MG/1
4 TABLET, ORALLY DISINTEGRATING ORAL ONCE
Status: COMPLETED | OUTPATIENT
Start: 2022-05-15 | End: 2022-05-15

## 2022-05-15 RX ORDER — ONDANSETRON 4 MG/1
4 TABLET, ORALLY DISINTEGRATING ORAL EVERY 8 HOURS PRN
Qty: 10 TABLET | Refills: 0 | Status: SHIPPED | OUTPATIENT
Start: 2022-05-15 | End: 2022-05-15 | Stop reason: SDUPTHER

## 2022-05-15 RX ORDER — ONDANSETRON 4 MG/1
4 TABLET, ORALLY DISINTEGRATING ORAL EVERY 8 HOURS PRN
Qty: 10 TABLET | Refills: 0 | Status: SHIPPED | OUTPATIENT
Start: 2022-05-15

## 2022-05-15 RX ORDER — OXYCODONE HYDROCHLORIDE 5 MG/1
5 TABLET ORAL EVERY 6 HOURS PRN
Qty: 10 TABLET | Refills: 0 | Status: SHIPPED | OUTPATIENT
Start: 2022-05-15 | End: 2022-05-23

## 2022-05-15 RX ORDER — OXYCODONE HYDROCHLORIDE 5 MG/1
5 TABLET ORAL EVERY 6 HOURS PRN
Qty: 10 TABLET | Refills: 0 | Status: SHIPPED | OUTPATIENT
Start: 2022-05-15 | End: 2022-05-15

## 2022-05-15 RX ORDER — OXYCODONE HYDROCHLORIDE AND ACETAMINOPHEN 5; 325 MG/1; MG/1
1 TABLET ORAL ONCE
Status: COMPLETED | OUTPATIENT
Start: 2022-05-15 | End: 2022-05-15

## 2022-05-15 RX ADMIN — OXYCODONE HYDROCHLORIDE AND ACETAMINOPHEN 1 TABLET: 5; 325 TABLET ORAL at 16:55

## 2022-05-15 RX ADMIN — ONDANSETRON 4 MG: 4 TABLET, ORALLY DISINTEGRATING ORAL at 17:47

## 2022-05-15 RX ADMIN — MORPHINE SULFATE 2 MG: 2 INJECTION, SOLUTION INTRAMUSCULAR; INTRAVENOUS at 14:13

## 2022-05-15 NOTE — DISCHARGE INSTRUCTIONS
Return to the ER with any new or worsening of symptoms such as but not limited to increased pain, numbness, tingling, weakness, or any other concerning symptoms    Wear your splint until you see orthopedics and are cleared    Do not take the oxycodone prior to driving or with alcohol    Thank you for allowing us to be part of your care today

## 2022-05-15 NOTE — ED PROVIDER NOTES
History  Chief Complaint   Patient presents with    Arm Injury     Pt c/o right arm pain s/p fall, unable to move due to pain, possible right elbow deformity     Patient presents the ER for evaluation of a right arm injury  The patient states that just PTA she was running and attempted to leave over something however fell on an outstretched arm  States that since then she has had persistent significant pain in the right elbow  States that she has not been able to move her right elbow since the injury  Patient notes that she did take 200 mg Motrin PTA  Patient denies any anticoagulant use  Denies any head strike  Denies any other injury incident  Patient does note some associated tingling in her right thumb, index finger and middle finger  Patient states that she is right-hand dominant  Patient denies any headaches, loss consciousness, dizziness, neck pain, weakness, back pain, abdominal pain, chest pain, shortness breath, or any other concerning symptoms  Prior to Admission Medications   Prescriptions Last Dose Informant Patient Reported?  Taking?   acetaminophen (TYLENOL) 650 mg CR tablet   No No   Sig: Take 1 tablet (650 mg total) by mouth every 8 (eight) hours as needed for mild pain   escitalopram (LEXAPRO) 5 mg tablet   Yes No   Sig: Take 5 mg by mouth daily at bedtime    ibuprofen (MOTRIN) 600 mg tablet   No No   Sig: Take 1 tablet (600 mg total) by mouth every 6 (six) hours as needed for mild pain      Facility-Administered Medications: None       Past Medical History:   Diagnosis Date    A-fib ([facility] [address] )     Menorrhagia     Vitamin D deficiency        Past Surgical History:   Procedure Laterality Date     SECTION      SC HYSTEROSCOPY,W/ENDO BX N/A 2021    Procedure: DILATATION AND CURETTAGE (D&C) WITH HYSTEROSCOPY;  Surgeon: Mario Alberto Mendez MD;  [facility];  Service: Gynecology    SC HYSTEROSCOPY,W/ENDOMETRIAL ABLATION N/A 2021    Procedure: ABLATION ENDOMETRIAL VIVIANA;  Surgeon: Oxana Anderson MD;  [facility];  Service: Gynecology    NC THYROID LOBECTOMY,UNILAT Right 6/21/2017    Procedure: THYROID LOBECTOMY WITH RECURRENT LARYNGEAL NERVE MONITORING (IMPULSE MONITORING); Surgeon: Luz Elena Gracia MD;  [facility];  Service: ENT       Family History   Problem Relation Age of Onset    Thyroid disease Mother     Breast cancer Maternal Grandmother     Breast cancer Maternal Aunt      I have reviewed and agree with the history as documented  E-Cigarette/Vaping    E-Cigarette Use Never User      E-Cigarette/Vaping Substances    Nicotine No     THC No     CBD No     Flavoring No     Other No     Unknown No      Social History     Tobacco Use    Smoking status: Never Smoker    Smokeless tobacco: Never Used   Vaping Use    Vaping Use: Never used   Substance Use Topics    Alcohol use: Yes     Comment: 4 drinks per week    Drug use: No       Review of Systems   Constitutional: Negative for fever  HENT: Negative for congestion and rhinorrhea  Respiratory: Negative for shortness of breath  Cardiovascular: Negative for chest pain  Gastrointestinal: Negative for vomiting  Musculoskeletal: Negative for back pain and neck pain  Skin: Negative for rash  Neurological: Negative for weakness and headaches  All other systems reviewed and are negative  Physical Exam  Physical Exam  Constitutional:       Appearance: She is well-developed  HENT:      Head: Normocephalic and atraumatic  Nose: Nose normal    Eyes:      Conjunctiva/sclera: Conjunctivae normal    Cardiovascular:      Rate and Rhythm: Normal rate  Pulses: Normal pulses  Pulmonary:      Effort: Pulmonary effort is normal    Abdominal:      Palpations: Abdomen is soft  Tenderness: There is no abdominal tenderness  Musculoskeletal:      Cervical back: Normal range of motion  Comments: No tenderness to palpation of right shoulder    Patient able to shoulder shrug bilaterally  Significant tenderness to palpation of right elbow with moderate swelling  Patient sitting in a 90 degree flexed position of the right elbow  Patient unable to flex or extend past that point  Mild pain in right elbow with flexion and extension of right wrist   Mildly decreased  strength on the right side secondary to pain  Normal range of motion of right fingers at MCP, PIP, and DIP joints  4/5 strength of opposition of thumb to index finger, thumb to middle finger, from thumb to ring finger, thumb to pinky finger  Skin:     General: Skin is warm  Capillary Refill: Capillary refill takes less than 2 seconds  Neurological:      Mental Status: She is alert and oriented to person, place, and time  Comments: GCS:  15/15  Patient complains of subjective tingling in the right thumb, index finger, middle finger however on exam, patient states it objectively feels the same compared to the other side           Vital Signs  ED Triage Vitals   Temperature Pulse Respirations Blood Pressure SpO2   05/15/22 1326 05/15/22 1326 05/15/22 1326 05/15/22 1326 05/15/22 1326   99 2 °F (37 3 °C) 80 18 138/64 100 %      Temp Source Heart Rate Source Patient Position - Orthostatic VS BP Location FiO2 (%)   05/15/22 1326 05/15/22 1326 05/15/22 1326 05/15/22 1326 --   Oral Monitor Sitting Left arm       Pain Score       05/15/22 1413       10 - Worst Possible Pain           Vitals:    05/15/22 1326   BP: 138/64   Pulse: 80   Patient Position - Orthostatic VS: Sitting         Visual Acuity      ED Medications  Medications   morphine injection 2 mg (2 mg Intramuscular Given 5/15/22 1413)   oxyCODONE-acetaminophen (PERCOCET) 5-325 mg per tablet 1 tablet (1 tablet Oral Given 5/15/22 1655)   ondansetron (ZOFRAN-ODT) dispersible tablet 4 mg (4 mg Oral Given 5/15/22 3737)       Diagnostic Studies  Results Reviewed     None                 XR forearm 2 views RIGHT   Final Result by Bhumi Garcia MD (05/15 1526)      Comminuted intra-articular fracture of the radial head and neck  Anatomic alignment is difficult to evaluate due to positioning limitations  Repeat x-rays or cross-sectional imaging recommended  The study was marked in [facility] for immediate notification  Workstation performed: [id number]         XR humerus RIGHT   Final Result by Jackson Munoz MD (05/15 1526)      Comminuted intra-articular fracture of the radial head and neck  Anatomic alignment is difficult to evaluate due to positioning limitations  Repeat x-rays or cross-sectional imaging recommended  The study was marked in [facility] for immediate notification  Workstation performed: [id number]         XR elbow 2 vw right   Final Result by Jackson Munoz MD (05/15 1526)      Comminuted intra-articular fracture of the radial head and neck  Anatomic alignment is difficult to evaluate due to positioning limitations  Repeat x-rays or cross-sectional imaging recommended  The study was marked in [facility] for immediate notification  Workstation performed: [id number]         XR elbow 2 vw right    (Results Pending)              Procedures  Splint application    Date/Time: 5/15/2022 5:53 PM  Performed by: Lexie Salcedo PA-C  Authorized by: Lexie Salcedo PA-C   Universal Protocol:  Procedure performed by: (ED tech)  Consent: Verbal consent obtained  Consent given by: patient  Patient understanding: patient states understanding of the procedure being performed  Patient identity confirmed: verbally with patient      Pre-procedure details:     Sensation:  Normal  Procedure details:     Laterality:  Right    Location:  Elbow    Elbow:  R elbow    Splint type:  Long arm    Supplies:  Cotton padding, elastic bandage and Ortho-Glass  Post-procedure details:     Pain:  Improved    Sensation:  Normal    Patient tolerance of procedure:   Tolerated well, no immediate complications             ED Course  ED Course as of 05/15/22 1843   Sun May 15, 2022   1355 Blood Pressure: 138/64   1355 Temperature: 99 2 °F (37 3 °C)   1355 Pulse: 80   1355 Respirations: 18   1355 SpO2: 100 %   1531 XR humerus RIGHT  IMPRESSION:     Comminuted intra-articular fracture of the radial head and neck  Anatomic alignment is difficult to evaluate due to positioning limitations  Repeat x-rays or cross-sectional imaging recommended  1531 XR elbow 2 vw right  IMPRESSION:     Comminuted intra-articular fracture of the radial head and neck  Anatomic alignment is difficult to evaluate due to positioning limitations  Repeat x-rays or cross-sectional imaging recommended  1559 XR forearm 2 views RIGHT  IMPRESSION:     Comminuted intra-articular fracture of the radial head and neck  Anatomic alignment is difficult to evaluate due to positioning limitations  Repeat x-rays or cross-sectional imaging recommended  1600 Ortho tiger texted   6630 Ortho requesting new lateral and AP images   0109 Reviewed xrays with orthopedics  States place in posterior splint and may follow up in office  1712 Patient feeling much better  Stable for discharge   1747 Splint placed  Patient complaining of mild nausea after percocet  Will give zofran  If feeling better, okay for discharge                                             MDM     Patient well appearing in the ER  No acute distress  No head injury  Patient is not on any anticoagulants  C-spine clinically cleared  Radial head and neck fracture noted on x-ray  See conversation with orthopedics above  Splint was made and placed in the ER  Patient was neurovascularly intact prior to and following splint application  Patient given sling for comfort  Discussed patient that she should not wear the sling while sleeping lying flat as opposed to choking hazard  Discussed with patient that the splint is to stay on until she sees Orthopedics    Discussed symptomatic treatment and close follow-up with orthopedics  Strict return precautions were discussed with patient who verbalized return precautions back to me  Discussed close follow-up with orthopedics  Symptomatic treatment and strict return instructions given  Patient in no acute distress throughout ER stay  Vitals stable and reassuring  Patient stable for discharge at this time  Reviewed plan with patient/family  Reviewed red flag symptoms and strict return instructions  Patient/family voiced understanding and agreement to plan  Patient/family had opportunity to ask questions and all questions were answered at bedside  Disposition  Final diagnoses:   Radial head fracture   Radial neck fracture   Nausea     Time reflects when diagnosis was documented in both MDM as applicable and the Disposition within this note     Time User Action Codes Description Comment    5/15/2022  5:13 PM Moran Challenger Add [I08 844W] Radial head fracture     5/15/2022  5:13 PM Moran Challenger Add [S71 271A] Radial neck fracture     5/15/2022  6:10 PM Moran Challenger Add [R11 0] Nausea       ED Disposition     ED Disposition   Discharge    Condition   Stable    Date/Time   Sun May 15, 2022  6:09 PM    [phone] discharge to home/self care                 Follow-up Information     Follow up With Specialties Details Why Contact Info Additional Information    [facility] Emergency Medicine  If symptoms worsen [address] [facility] [id number] [place] [address] Emergency Department, [address], [place], [place], [address] [facility], DO Family Medicine In 2 days  [address] [facility] Schedule an appointment as soon as possible for a visit in 2 days Follow up with orthopedics for your arm fracture [address] [address] [address], [address], [place], [address] [facility]          Discharge Medication List as of 5/15/2022  6:11 PM      START taking these medications    Details   ondansetron (ZOFRAN-ODT) 4 mg disintegrating tablet Take 1 tablet (4 mg total) by mouth every 8 (eight) hours as needed for nausea or vomiting, Starting Sun 5/15/2022, Normal      oxyCODONE (Roxicodone) 5 immediate release tablet Take 1 tablet (5 mg total) by mouth every 6 (six) hours as needed for moderate pain Max Daily Amount: 20 mg, Starting Sun 5/15/2022, Normal         CONTINUE these medications which have NOT CHANGED    Details   acetaminophen (TYLENOL) 650 mg CR tablet Take 1 tablet (650 mg total) by mouth every 8 (eight) hours as needed for mild pain, Starting Tue 9/28/2021, Normal      escitalopram (LEXAPRO) 5 mg tablet Take 5 mg by mouth daily at bedtime , Starting Thu 4/29/2021, Until Tue 9/21/2021, Historical Med      ibuprofen (MOTRIN) 600 mg tablet Take 1 tablet (600 mg total) by mouth every 6 (six) hours as needed for mild pain, Starting Tue 9/28/2021, Normal             No discharge procedures on file      PDMP Review     None          ED Provider  Electronically Signed by           Yolette Guillory PA-C  05/15/22 7133

## 2022-05-16 ENCOUNTER — TELEPHONE (OUTPATIENT)
Dept: OBGYN CLINIC | Facility: OTHER | Age: 42
End: 2022-05-16

## 2022-05-16 NOTE — TELEPHONE ENCOUNTER
email sent to [facility]!!    I have a patient that has an elbow fracture and is in need of seeing Dr Perico Parks  I consulted Julian Rico RN      Patient is :  Brett Height   : [birth year]-  MRN : [id number]  C/b # [id number]  Reason for Appointment : Elbow Fracture  Requested Doctor & Location : Dr Perico Parks, Thursday if possible    Thank you    Nancy

## 2022-05-19 ENCOUNTER — HOSPITAL ENCOUNTER (OUTPATIENT)
Dept: CT IMAGING | Facility: HOSPITAL | Age: 42
Discharge: HOME | End: 2022-05-19
Payer: COMMERCIAL

## 2022-05-19 ENCOUNTER — TELEPHONE (OUTPATIENT)
Dept: OBGYN CLINIC | Facility: MEDICAL CENTER | Age: 42
End: 2022-05-19

## 2022-05-19 ENCOUNTER — OFFICE VISIT (OUTPATIENT)
Dept: OBGYN CLINIC | Facility: CLINIC | Age: 42
End: 2022-05-19
Payer: COMMERCIAL

## 2022-05-19 VITALS — HEART RATE: 70 BPM | WEIGHT: 150 LBS | HEIGHT: 66 IN | BODY MASS INDEX: 24.11 KG/M2

## 2022-05-19 DIAGNOSIS — S52.121A CLOSED DISPLACED FRACTURE OF HEAD OF RIGHT RADIUS, INITIAL ENCOUNTER: Primary | ICD-10-CM

## 2022-05-19 DIAGNOSIS — S52.121A CLOSED DISPLACED FRACTURE OF HEAD OF RIGHT RADIUS, INITIAL ENCOUNTER: ICD-10-CM

## 2022-05-19 PROCEDURE — 99204 OFFICE O/P NEW MOD 45 MIN: CPT | Performed by: ORTHOPAEDIC SURGERY

## 2022-05-19 PROCEDURE — 73200 CT UPPER EXTREMITY W/O DYE: CPT

## 2022-05-19 PROCEDURE — G1004 CDSM NDSC: HCPCS

## 2022-05-19 RX ORDER — CHLORHEXIDINE GLUCONATE 4 G/100ML
SOLUTION TOPICAL DAILY PRN
Status: CANCELLED | OUTPATIENT
Start: 2022-05-19

## 2022-05-19 RX ORDER — CHLORHEXIDINE GLUCONATE 0.12 MG/ML
15 RINSE ORAL ONCE
Status: CANCELLED | OUTPATIENT
Start: 2022-05-19 | End: 2022-05-19

## 2022-05-19 NOTE — LETTER
May 20, 2022     Claritza Choudhury, [address]    Patient: Matty Doherty   YOB: 1980   Date of Visit: 2022       Dear Dr Shree Draper:    Thank you for referring Matty Doherty to me for evaluation  Below are my notes for this consultation  If you have questions, please do not hesitate to call me  I look forward to following your patient along with you  Sincerely,        Teresita Mariella        CC: No Recipients  Teresita Wolff  2022  3:58 PM  Signed  [address] [facility] [address]  [phone]       Matty Doherty  [id number]  1980    ORTHOPAEDIC SURGERY OUTPATIENT NOTE  2022      HISTORY:  38 y o  female who presents to the office today for an initial evaluation of her right elbow  She states that on 5/15/2022, she states that she went to jump over a chain her foot destructive changes and she fell with her right arm outstretched  She states that she fell initial sharp pain about her right elbow  She states that she presented to the ED immediately after the injury occurred where x-rays were taken and she was placed into a posterior splint and provided with a sling  She states that she is experiencing constant numbness and tingling into all her fingers  She states that she will experience intermittent sharp pain  She states that she will use Tylenol and oxycodone to help alleviate her pain  She is right-hand dominant      Past Medical History:   Diagnosis Date    A-fib [facility])     Menorrhagia     Vitamin D deficiency        Past Surgical History:   Procedure Laterality Date     SECTION      [place] HYSTEROSCOPY,W/ENDO BX N/A 2021    Procedure: DILATATION AND CURETTAGE (D&C) WITH HYSTEROSCOPY;  Surgeon: Deedee Scott MD;  [facility];  Service: Gynecology    [place] HYSTEROSCOPY,W/ENDOMETRIAL ABLATION N/A 2021    Procedure: ABLATION ENDOMETRIAL VIVIANA;  Surgeon: Rubens Watters MD;  [facility];  Service: Gynecology    [place] THYROID LOBECTOMY,UNILAT Right 6/21/2017    Procedure: THYROID LOBECTOMY WITH RECURRENT LARYNGEAL NERVE MONITORING (IMPULSE MONITORING);   Surgeon: Stephy Cohen MD;  [facility];  Service: ENT       Social History     Socioeconomic History    Marital status: /Civil Union     Spouse name: Not on file    Number of children: Not on file    Years of education: Not on file    Highest education level: Not on file   Occupational History    Not on file   Tobacco Use    Smoking status: Never Smoker    Smokeless tobacco: Never Used   Vaping Use    Vaping Use: Never used   Substance and Sexual Activity    Alcohol use: Yes     Comment: 4 drinks per week    Drug use: No    Sexual activity: Yes     Partners: Male     Birth control/protection: Male Sterilization   Other Topics Concern    Not on file   Social History Narrative    Not on file     Social Determinants of Health     Financial Resource Strain: Not on file   Food Insecurity: Not on file   Transportation Needs: Not on file   Physical Activity: Not on file   Stress: Not on file   Social Connections: Not on file   Intimate Partner Violence: Not on file   Housing Stability: Not on file       Family History   Problem Relation Age of Onset    Thyroid disease Mother     Breast cancer Maternal Grandmother     Breast cancer Maternal Aunt         Patient's Medications   New Prescriptions    No medications on file   Previous Medications    ACETAMINOPHEN (TYLENOL) 650 MG CR TABLET    Take 1 tablet (650 mg total) by mouth every 8 (eight) hours as needed for mild pain    ESCITALOPRAM (LEXAPRO) 5 MG TABLET    Take 5 mg by mouth daily at bedtime     IBUPROFEN (MOTRIN) 600 MG TABLET    Take 1 tablet (600 mg total) by mouth every 6 (six) hours as needed for mild pain    ONDANSETRON (ZOFRAN-ODT) 4 MG DISINTEGRATING TABLET    Take 1 tablet (4 mg total) by mouth every 8 (eight) hours as needed for nausea or vomiting    OXYCODONE (ROXICODONE) 5 IMMEDIATE RELEASE TABLET    Take 1 tablet (5 mg total) by mouth every 6 (six) hours as needed for moderate pain Max Daily Amount: 20 mg   Modified Medications    No medications on file   Discontinued Medications    No medications on file       No Known Allergies     Pulse 70   Ht 5' 6" (1 676 m)   Wt 68 kg (150 lb)   BMI 24 21 kg/m²      REVIEW OF SYSTEMS:  Constitutional: Negative  HEENT: Negative  Respiratory: Negative  Skin: Negative  Neurological: Negative  Psychiatric/Behavioral: Negative  Musculoskeletal: Negative except for that mentioned in the HPI  PHYSICAL EXAM:    Right Elbow:  Skin Intact  Mild swelling  Radial/median/ulnar nerve intact    <2 sec cap refill    Right Wrist:  Decreased range of motion secondary due to stiffness  No DRUJ instability  Compartments soft  Brisk capillary refill  S/m intact median, radial, and ulnar nerve     IMAGING:  X-rays performed on 05/15/2022 of her right elbow demonstrates a comminuted radial head fracture  ASSESSMENT AND PLAN:  38 y o  female right elbow displaced radial head fracture, DOI 5/14/2022    X-ray's were reviewed with the patient at today's visit  I discussed with the patient that due to the severity of the fracture, I recommend surgical intervention of a right elbow open reduction internal fixation versus a radial head replacement and all necessary reconstructive procedures  The patient understands the risks and benefits of the procedure with risks including pain, stiffness, infection, neurovascular injury, recurrence of symptoms, failure of surgical procedure, inadvertent intraoperative complications, blood loss, blood clots, allergic reaction to anesthesia, stroke, heart attack, all up to and including to death  The patient understood and did consent for surgery today  A STAT CT Scan was ordered at today's visit to surgical planning  She will remain in the sling full time until surgery  She was instructed to preform gentle range of motion exercises of the wrist and digits      Scribe Attestation    I,:  Argelia Peña am acting as a scribe while in the presence of the attending physician :       I,:  Dia Weller personally performed the services described in this documentation    as scribed in my presence :

## 2022-05-19 NOTE — H&P
[address] [address]  [address]  [id number]  1980    ORTHOPAEDIC SURGERY OUTPATIENT NOTE  2022      HISTORY:  38 y o  female who presents to the office today for an initial evaluation of her right elbow  She states that on 5/15/2022, she states that she went to jump over a chain her foot destructive changes and she fell with her right arm outstretched  She states that she fell initial sharp pain about her right elbow  She states that she presented to the ED immediately after the injury occurred where x-rays were taken and she was placed into a posterior splint and provided with a sling  She states that she is experiencing constant numbness and tingling into all her fingers  She states that she will experience intermittent sharp pain  She states that she will use Tylenol and oxycodone to help alleviate her pain  She is right-hand dominant  Past Medical History:   Diagnosis Date    A-fib [facility])     Menorrhagia     Vitamin D deficiency        Past Surgical History:   Procedure Laterality Date     SECTION      CT HYSTEROSCOPY,W/ENDO BX N/A 2021    Procedure: DILATATION AND CURETTAGE (D&C) WITH HYSTEROSCOPY;  Surgeon: Brian Singh MD;  [facility];  Service: Gynecology    CT HYSTEROSCOPY,W/ENDOMETRIAL ABLATION N/A 2021    Procedure: Laura Gottron;  Surgeon: Brian Singh MD;  [facility];  Service: Gynecology    CT THYROID LOBECTOMY,UNILAT Right 2017    Procedure: THYROID LOBECTOMY WITH RECURRENT LARYNGEAL NERVE MONITORING (IMPULSE MONITORING);   Surgeon: Ben Ayala MD;  [facility];  Service: ENT       Social History     Socioeconomic History    Marital status: /Civil Union     Spouse name: Not on file    Number of children: Not on file    Years of education: Not on file    Highest education level: Not on file   Occupational History    Not on file   Tobacco Use    Smoking status: Never Smoker    Smokeless tobacco: Never Used   Vaping Use    Vaping Use: Never used   Substance and Sexual Activity    Alcohol use: Yes     Comment: 4 drinks per week    Drug use: No    Sexual activity: Yes     Partners: Male     Birth control/protection: Male Sterilization   Other Topics Concern    Not on file   Social History Narrative    Not on file     Social Determinants of Health     Financial Resource Strain: Not on file   Food Insecurity: Not on file   Transportation Needs: Not on file   Physical Activity: Not on file   Stress: Not on file   Social Connections: Not on file   Intimate Partner Violence: Not on file   Housing Stability: Not on file       Family History   Problem Relation Age of Onset    Thyroid disease Mother     Breast cancer Maternal Grandmother     Breast cancer Maternal Aunt         Patient's Medications   New Prescriptions    No medications on file   Previous Medications    ACETAMINOPHEN (TYLENOL) 650 MG CR TABLET    Take 1 tablet (650 mg total) by mouth every 8 (eight) hours as needed for mild pain    ESCITALOPRAM (LEXAPRO) 5 MG TABLET    Take 5 mg by mouth daily at bedtime     IBUPROFEN (MOTRIN) 600 MG TABLET    Take 1 tablet (600 mg total) by mouth every 6 (six) hours as needed for mild pain    ONDANSETRON (ZOFRAN-ODT) 4 MG DISINTEGRATING TABLET    Take 1 tablet (4 mg total) by mouth every 8 (eight) hours as needed for nausea or vomiting    OXYCODONE (ROXICODONE) 5 IMMEDIATE RELEASE TABLET    Take 1 tablet (5 mg total) by mouth every 6 (six) hours as needed for moderate pain Max Daily Amount: 20 mg   Modified Medications    No medications on file   Discontinued Medications    No medications on file       No Known Allergies     Pulse 70   Ht 5' 6" (1 676 m)   Wt 68 kg (150 lb)   BMI 24 21 kg/m²      REVIEW OF SYSTEMS:  Constitutional: Negative  HEENT: Negative  Respiratory: Negative  Skin: Negative  Neurological: Negative  Psychiatric/Behavioral: Negative  Musculoskeletal: Negative except for that mentioned in the HPI  PHYSICAL EXAM:    Right Elbow:  Skin Intact  Mild swelling  Radial/median/ulnar nerve intact    <2 sec cap refill    Right Wrist:  Decreased range of motion secondary due to stiffness  No DRUJ instability  Compartments soft  Brisk capillary refill  S/m intact median, radial, and ulnar nerve     IMAGING:  X-rays performed on 05/15/2022 of her right elbow demonstrates a comminuted radial head fracture  ASSESSMENT AND PLAN:  38 y o  female right elbow displaced radial head fracture, DOI 5/14/2022    X-ray's were reviewed with the patient at today's visit  I discussed with the patient that due to the severity of the fracture, I recommend surgical intervention of a right elbow open reduction internal fixation versus a radial head replacement and all necessary reconstructive procedures  The patient understands the risks and benefits of the procedure with risks including pain, stiffness, infection, neurovascular injury, recurrence of symptoms, failure of surgical procedure, inadvertent intraoperative complications, blood loss, blood clots, allergic reaction to anesthesia, stroke, heart attack, all up to and including to death  The patient understood and did consent for surgery today  A STAT CT Scan was ordered at today's visit to surgical planning  She will remain in the sling full time until surgery  She was instructed to preform gentle range of motion exercises of the wrist and digits      Scribe Attestation    I,:  Boy Arthur am acting as a scribe while in the presence of the attending physician :       I,:  Sergio Warren personally performed the services described in this documentation    as scribed in my presence :

## 2022-05-19 NOTE — H&P (VIEW-ONLY)
[address] [address]  [address]  [id number]  1980    ORTHOPAEDIC SURGERY OUTPATIENT NOTE  2022      HISTORY:  38 y o  female who presents to the office today for an initial evaluation of her right elbow  She states that on 5/15/2022, she states that she went to jump over a chain her foot destructive changes and she fell with her right arm outstretched  She states that she fell initial sharp pain about her right elbow  She states that she presented to the ED immediately after the injury occurred where x-rays were taken and she was placed into a posterior splint and provided with a sling  She states that she is experiencing constant numbness and tingling into all her fingers  She states that she will experience intermittent sharp pain  She states that she will use Tylenol and oxycodone to help alleviate her pain  She is right-hand dominant  Past Medical History:   Diagnosis Date    [facility]-fib [facility])     Menorrhagia     Vitamin D deficiency        Past Surgical History:   Procedure Laterality Date     SECTION      [place] HYSTEROSCOPY,W/ENDO BX N/A 2021    Procedure: DILATATION AND CURETTAGE (D&C) WITH HYSTEROSCOPY;  Surgeon: Castillo Figueroa MD;  [facility];  Service: Gynecology    [place] HYSTEROSCOPY,W/ENDOMETRIAL ABLATION N/A 2021    Procedure: Brittny Pack;  Surgeon: Castillo Figueroa MD;  [facility];  Service: Gynecology    [place] THYROID LOBECTOMY,UNILAT Right 2017    Procedure: THYROID LOBECTOMY WITH RECURRENT LARYNGEAL NERVE MONITORING (IMPULSE MONITORING);   Surgeon: Jewel Roberto MD;  [facility];  Service: ENT       Social History     Socioeconomic History    Marital status: /Civil Union     Spouse name: Not on file    Number of children: Not on file    Years of education: Not on file    Highest education level: Not on file   Occupational History    Not on file   Tobacco Use    Smoking status: Never Smoker    Smokeless tobacco: Never Used   Vaping Use    Vaping Use: Never used   Substance and Sexual Activity    Alcohol use: Yes     Comment: 4 drinks per week    Drug use: No    Sexual activity: Yes     Partners: Male     Birth control/protection: Male Sterilization   Other Topics Concern    Not on file   Social History Narrative    Not on file     Social Determinants of Health     Financial Resource Strain: Not on file   Food Insecurity: Not on file   Transportation Needs: Not on file   Physical Activity: Not on file   Stress: Not on file   Social Connections: Not on file   Intimate Partner Violence: Not on file   Housing Stability: Not on file       Family History   Problem Relation Age of Onset    Thyroid disease Mother     Breast cancer Maternal Grandmother     Breast cancer Maternal Aunt         Patient's Medications   New Prescriptions    No medications on file   Previous Medications    ACETAMINOPHEN (TYLENOL) 650 MG CR TABLET    Take 1 tablet (650 mg total) by mouth every 8 (eight) hours as needed for mild pain    ESCITALOPRAM (LEXAPRO) 5 MG TABLET    Take 5 mg by mouth daily at bedtime     IBUPROFEN (MOTRIN) 600 MG TABLET    Take 1 tablet (600 mg total) by mouth every 6 (six) hours as needed for mild pain    ONDANSETRON (ZOFRAN-ODT) 4 MG DISINTEGRATING TABLET    Take 1 tablet (4 mg total) by mouth every 8 (eight) hours as needed for nausea or vomiting    OXYCODONE (ROXICODONE) 5 IMMEDIATE RELEASE TABLET    Take 1 tablet (5 mg total) by mouth every 6 (six) hours as needed for moderate pain Max Daily Amount: 20 mg   Modified Medications    No medications on file   Discontinued Medications    No medications on file       No Known Allergies     Pulse 70   Ht 5' 6" (1 676 m)   Wt 68 kg (150 lb)   BMI 24 21 kg/m²      REVIEW OF SYSTEMS:  Constitutional: Negative  HEENT: Negative  Respiratory: Negative  Skin: Negative  Neurological: Negative  Psychiatric/Behavioral: Negative  Musculoskeletal: Negative except for that mentioned in the HPI  PHYSICAL EXAM:    Right Elbow:  Skin Intact  Mild swelling  Radial/median/ulnar nerve intact    <2 sec cap refill    Right Wrist:  Decreased range of motion secondary due to stiffness  No DRUJ instability  Compartments soft  Brisk capillary refill  S/m intact median, radial, and ulnar nerve     IMAGING:  X-rays performed on 05/15/2022 of her right elbow demonstrates a comminuted radial head fracture  ASSESSMENT AND PLAN:  38 y o  female right elbow displaced radial head fracture, DOI 5/14/2022    X-ray's were reviewed with the patient at today's visit  I discussed with the patient that due to the severity of the fracture, I recommend surgical intervention of a right elbow open reduction internal fixation versus a radial head replacement and all necessary reconstructive procedures  The patient understands the risks and benefits of the procedure with risks including pain, stiffness, infection, neurovascular injury, recurrence of symptoms, failure of surgical procedure, inadvertent intraoperative complications, blood loss, blood clots, allergic reaction to anesthesia, stroke, heart attack, all up to and including to death  The patient understood and did consent for surgery today  A STAT CT Scan was ordered at today's visit to surgical planning  She will remain in the sling full time until surgery  She was instructed to preform gentle range of motion exercises of the wrist and digits      Scribe Attestation    I,:  Tia Munoz am acting as a scribe while in the presence of the attending physician :       I,:  Shobha Chirinos personally performed the services described in this documentation    as scribed in my presence :

## 2022-05-19 NOTE — PROGRESS NOTES
[address] [address]  [address]  [id number]  1980    ORTHOPAEDIC SURGERY OUTPATIENT NOTE  2022      HISTORY:  38 y o  female who presents to the office today for an initial evaluation of her right elbow  She states that on 5/15/2022, she states that she went to jump over a chain her foot destructive changes and she fell with her right arm outstretched  She states that she fell initial sharp pain about her right elbow  She states that she presented to the ED immediately after the injury occurred where x-rays were taken and she was placed into a posterior splint and provided with a sling  She states that she is experiencing constant numbness and tingling into all her fingers  She states that she will experience intermittent sharp pain  She states that she will use Tylenol and oxycodone to help alleviate her pain  She is right-hand dominant  Past Medical History:   Diagnosis Date    A-fib [facility])     Menorrhagia     Vitamin D deficiency        Past Surgical History:   Procedure Laterality Date     SECTION      [place] HYSTEROSCOPY,W/ENDO BX N/A 2021    Procedure: DILATATION AND CURETTAGE (D&C) WITH HYSTEROSCOPY;  Surgeon: Bob Saavedra MD;  [facility];  Service: Gynecology    [place] HYSTEROSCOPY,W/ENDOMETRIAL ABLATION N/A 2021    Procedure: Romie Lawson;  Surgeon: Bob Saavedra MD;  [facility];  Service: Gynecology    [place] THYROID LOBECTOMY,UNILAT Right 2017    Procedure: THYROID LOBECTOMY WITH RECURRENT LARYNGEAL NERVE MONITORING (IMPULSE MONITORING);   Surgeon: Markell Walker MD;  [facility];  Service: ENT       Social History     Socioeconomic History    Marital status: /Civil Union     Spouse name: Not on file    Number of children: Not on file    Years of education: Not on file    Highest education level: Not on file   Occupational History    Not on file   Tobacco Use    Smoking status: Never Smoker    Smokeless tobacco: Never Used   Vaping Use    Vaping Use: Never used   Substance and Sexual Activity    Alcohol use: Yes     Comment: 4 drinks per week    Drug use: No    Sexual activity: Yes     Partners: Male     Birth control/protection: Male Sterilization   Other Topics Concern    Not on file   Social History Narrative    Not on file     Social Determinants of Health     Financial Resource Strain: Not on file   Food Insecurity: Not on file   Transportation Needs: Not on file   Physical Activity: Not on file   Stress: Not on file   Social Connections: Not on file   Intimate Partner Violence: Not on file   Housing Stability: Not on file       Family History   Problem Relation Age of Onset    Thyroid disease Mother     Breast cancer Maternal Grandmother     Breast cancer Maternal Aunt         Patient's Medications   New Prescriptions    No medications on file   Previous Medications    ACETAMINOPHEN (TYLENOL) 650 MG CR TABLET    Take 1 tablet (650 mg total) by mouth every 8 (eight) hours as needed for mild pain    ESCITALOPRAM (LEXAPRO) 5 MG TABLET    Take 5 mg by mouth daily at bedtime     IBUPROFEN (MOTRIN) 600 MG TABLET    Take 1 tablet (600 mg total) by mouth every 6 (six) hours as needed for mild pain    ONDANSETRON (ZOFRAN-ODT) 4 MG DISINTEGRATING TABLET    Take 1 tablet (4 mg total) by mouth every 8 (eight) hours as needed for nausea or vomiting    OXYCODONE (ROXICODONE) 5 IMMEDIATE RELEASE TABLET    Take 1 tablet (5 mg total) by mouth every 6 (six) hours as needed for moderate pain Max Daily Amount: 20 mg   Modified Medications    No medications on file   Discontinued Medications    No medications on file       No Known Allergies     Pulse 70   Ht 5' 6" (1 676 m)   Wt 68 kg (150 lb)   BMI 24 21 kg/m²      REVIEW OF SYSTEMS:  Constitutional: Negative  HEENT: Negative  Respiratory: Negative  Skin: Negative  Neurological: Negative  Psychiatric/Behavioral: Negative  Musculoskeletal: Negative except for that mentioned in the HPI  PHYSICAL EXAM:    Right Elbow:  Skin Intact  Mild swelling  Radial/median/ulnar nerve intact    <2 sec cap refill    Right Wrist:  Decreased range of motion secondary due to stiffness  No DRUJ instability  Compartments soft  Brisk capillary refill  S/m intact median, radial, and ulnar nerve     IMAGING:  X-rays performed on 05/15/2022 of her right elbow demonstrates a comminuted radial head fracture  ASSESSMENT AND PLAN:  38 y o  female right elbow displaced radial head fracture, DOI 5/14/2022    X-ray's were reviewed with the patient at today's visit  I discussed with the patient that due to the severity of the fracture, I recommend surgical intervention of a right elbow open reduction internal fixation versus a radial head replacement and all necessary reconstructive procedures  The patient understands the risks and benefits of the procedure with risks including pain, stiffness, infection, neurovascular injury, recurrence of symptoms, failure of surgical procedure, inadvertent intraoperative complications, blood loss, blood clots, allergic reaction to anesthesia, stroke, heart attack, all up to and including to death  The patient understood and did consent for surgery today  A STAT CT Scan was ordered at today's visit to surgical planning  She will remain in the sling full time until surgery  She was instructed to preform gentle range of motion exercises of the wrist and digits      Scribe Attestation    I,:  Gary Reyna am acting as a scribe while in the presence of the attending physician :       I,:  Berenice Reyna personally performed the services described in this documentation    as scribed in my presence :

## 2022-05-20 ENCOUNTER — APPOINTMENT (OUTPATIENT)
Dept: LAB | Facility: CLINIC | Age: 42
End: 2022-05-20
Payer: COMMERCIAL

## 2022-05-20 ENCOUNTER — TELEPHONE (OUTPATIENT)
Dept: OBGYN CLINIC | Facility: MEDICAL CENTER | Age: 42
End: 2022-05-20

## 2022-05-20 DIAGNOSIS — S52.121A CLOSED DISPLACED FRACTURE OF HEAD OF RIGHT RADIUS, INITIAL ENCOUNTER: ICD-10-CM

## 2022-05-20 LAB
ALBUMIN SERPL BCP-MCNC: 4 G/DL (ref 3.5–5)
ALP SERPL-CCNC: 46 U/L (ref 46–116)
ALT SERPL W P-5'-P-CCNC: 20 U/L (ref 12–78)
ANION GAP SERPL CALCULATED.3IONS-SCNC: 7 MMOL/L (ref 4–13)
AST SERPL W P-5'-P-CCNC: 12 U/L (ref 5–45)
BASOPHILS # BLD AUTO: 0.06 THOUSANDS/ΜL (ref 0–0.1)
BASOPHILS NFR BLD AUTO: 1 % (ref 0–1)
BILIRUB SERPL-MCNC: 0.62 MG/DL (ref 0.2–1)
BUN SERPL-MCNC: 11 MG/DL (ref 5–25)
CALCIUM SERPL-MCNC: 9.3 MG/DL (ref 8.3–10.1)
CHLORIDE SERPL-SCNC: 106 MMOL/L (ref 100–108)
CO2 SERPL-SCNC: 25 MMOL/L (ref 21–32)
CREAT SERPL-MCNC: 0.75 MG/DL (ref 0.6–1.3)
EOSINOPHIL # BLD AUTO: 0.31 THOUSAND/ΜL (ref 0–0.61)
EOSINOPHIL NFR BLD AUTO: 4 % (ref 0–6)
ERYTHROCYTE [DISTWIDTH] IN BLOOD BY AUTOMATED COUNT: 16.2 % (ref 11.6–15.1)
GFR SERPL CREATININE-BSD FRML MDRD: 97 ML/MIN/1.73SQ M
GLUCOSE P FAST SERPL-MCNC: 92 MG/DL (ref 65–99)
HCT VFR BLD AUTO: 39.4 % (ref 34.8–46.1)
HGB BLD-MCNC: 12.5 G/DL (ref 11.5–15.4)
IMM GRANULOCYTES # BLD AUTO: 0.02 THOUSAND/UL (ref 0–0.2)
IMM GRANULOCYTES NFR BLD AUTO: 0 % (ref 0–2)
INR PPP: 0.99 (ref 0.84–1.19)
LYMPHOCYTES # BLD AUTO: 1.87 THOUSANDS/ΜL (ref 0.6–4.47)
LYMPHOCYTES NFR BLD AUTO: 24 % (ref 14–44)
MCH RBC QN AUTO: 26.8 PG (ref 26.8–34.3)
MCHC RBC AUTO-ENTMCNC: 31.7 G/DL (ref 31.4–37.4)
MCV RBC AUTO: 84 FL (ref 82–98)
MONOCYTES # BLD AUTO: 0.74 THOUSAND/ΜL (ref 0.17–1.22)
MONOCYTES NFR BLD AUTO: 10 % (ref 4–12)
NEUTROPHILS # BLD AUTO: 4.69 THOUSANDS/ΜL (ref 1.85–7.62)
NEUTS SEG NFR BLD AUTO: 61 % (ref 43–75)
NRBC BLD AUTO-RTO: 0 /100 WBCS
PLATELET # BLD AUTO: 297 THOUSANDS/UL (ref 149–390)
PMV BLD AUTO: 11.7 FL (ref 8.9–12.7)
POTASSIUM SERPL-SCNC: 3.9 MMOL/L (ref 3.5–5.3)
PROT SERPL-MCNC: 7.6 G/DL (ref 6.4–8.2)
PROTHROMBIN TIME: 12.8 SECONDS (ref 11.6–14.5)
RBC # BLD AUTO: 4.67 MILLION/UL (ref 3.81–5.12)
SODIUM SERPL-SCNC: 138 MMOL/L (ref 136–145)
WBC # BLD AUTO: 7.69 THOUSAND/UL (ref 4.31–10.16)

## 2022-05-20 PROCEDURE — 85025 COMPLETE CBC W/AUTO DIFF WBC: CPT

## 2022-05-20 PROCEDURE — 85610 PROTHROMBIN TIME: CPT

## 2022-05-20 PROCEDURE — 36415 COLL VENOUS BLD VENIPUNCTURE: CPT

## 2022-05-20 PROCEDURE — 80053 COMPREHEN METABOLIC PANEL: CPT

## 2022-05-20 NOTE — PRE-PROCEDURE INSTRUCTIONS
Pre-Surgery Instructions:   Medication Instructions    acetaminophen (TYLENOL) 650 mg CR tablet Uses PRN- OK to take day of surgery    escitalopram (LEXAPRO) 5 mg tablet Take night before surgery    ibuprofen (MOTRIN) 600 mg tablet Stop taking 3 days prior to surgery   ondansetron (ZOFRAN-ODT) 4 mg disintegrating tablet Uses PRN- OK to take day of surgery    oxyCODONE (Roxicodone) 5 immediate release tablet Uses PRN- OK to take day of surgery    All pre-op instructions reviewed as per Jenna [facility] My Surgery Experience w/ pt verb understanding  Inst NPO post MN night before sx except sips water w/ am meds  Instructed to avoid all ASA/NSAIDs and OTC Vit/Supp from now until after surgery per anesthesia guidelines  Tylenol ok prn  Received pre-op showering instructions & CHG from surgeon office, reviewed process at time of call  Current hospital visitor & masking policy reviewed  Inst will receive phone call w/ time to report on DOS btwn 2-8 pm afternoon/evening before surgery from hospital nursing staff  Pt  Verbalized an understanding of all instructions reviewed and offers no concerns at this time

## 2022-05-20 NOTE — TELEPHONE ENCOUNTER
Pt returning my call  Pt aware of her sx date and location  Pt aware that she will receive a call later today with sx time  PO appt made  Bathing instructions given  She will complete lab work today  Provided her with my contact info if she has any further questions or concerns

## 2022-05-22 ENCOUNTER — ANESTHESIA EVENT (OUTPATIENT)
Dept: PERIOP | Facility: HOSPITAL | Age: 42
End: 2022-05-22
Payer: COMMERCIAL

## 2022-05-23 ENCOUNTER — HOSPITAL ENCOUNTER (OUTPATIENT)
Dept: RADIOLOGY | Facility: HOSPITAL | Age: 42
Setting detail: OUTPATIENT SURGERY
Discharge: HOME | End: 2022-05-23
Payer: COMMERCIAL

## 2022-05-23 ENCOUNTER — ANESTHESIA (OUTPATIENT)
Dept: PERIOP | Facility: HOSPITAL | Age: 42
End: 2022-05-23
Payer: COMMERCIAL

## 2022-05-23 ENCOUNTER — HOSPITAL ENCOUNTER (OUTPATIENT)
Facility: HOSPITAL | Age: 42
Setting detail: OUTPATIENT SURGERY
Discharge: HOME | End: 2022-05-23
Attending: ORTHOPAEDIC SURGERY | Admitting: ORTHOPAEDIC SURGERY
Payer: COMMERCIAL

## 2022-05-23 VITALS
WEIGHT: 148 LBS | SYSTOLIC BLOOD PRESSURE: 115 MMHG | OXYGEN SATURATION: 100 % | HEART RATE: 65 BPM | RESPIRATION RATE: 12 BRPM | DIASTOLIC BLOOD PRESSURE: 57 MMHG | BODY MASS INDEX: 23.78 KG/M2 | TEMPERATURE: 97.2 F | HEIGHT: 66 IN

## 2022-05-23 DIAGNOSIS — Z47.89 AFTERCARE FOLLOWING SURGERY OF THE MUSCULOSKELETAL SYSTEM: Primary | ICD-10-CM

## 2022-05-23 LAB
EXT PREGNANCY TEST URINE: NEGATIVE
EXT. CONTROL: NORMAL

## 2022-05-23 PROCEDURE — C1776 JOINT DEVICE (IMPLANTABLE): HCPCS | Performed by: ORTHOPAEDIC SURGERY

## 2022-05-23 PROCEDURE — 81025 URINE PREGNANCY TEST: CPT | Performed by: ORTHOPAEDIC SURGERY

## 2022-05-23 PROCEDURE — 24366 RECONSTRUCT HEAD OF RADIUS: CPT | Performed by: ORTHOPAEDIC SURGERY

## 2022-05-23 PROCEDURE — C9290 INJ, BUPIVACAINE LIPOSOME: HCPCS | Performed by: ANESTHESIOLOGY

## 2022-05-23 PROCEDURE — 73070 X-RAY EXAM OF ELBOW: CPT

## 2022-05-23 DEVICE — IMPLANTABLE DEVICE
Type: IMPLANTABLE DEVICE | Site: ELBOW | Status: FUNCTIONAL
Brand: EVOLVE

## 2022-05-23 RX ORDER — MIDAZOLAM HYDROCHLORIDE 2 MG/2ML
INJECTION, SOLUTION INTRAMUSCULAR; INTRAVENOUS AS NEEDED
Status: DISCONTINUED | OUTPATIENT
Start: 2022-05-23 | End: 2022-05-23

## 2022-05-23 RX ORDER — DEXAMETHASONE SODIUM PHOSPHATE 10 MG/ML
INJECTION, SOLUTION INTRAMUSCULAR; INTRAVENOUS AS NEEDED
Status: DISCONTINUED | OUTPATIENT
Start: 2022-05-23 | End: 2022-05-23

## 2022-05-23 RX ORDER — SODIUM CHLORIDE, SODIUM LACTATE, POTASSIUM CHLORIDE, CALCIUM CHLORIDE 600; 310; 30; 20 MG/100ML; MG/100ML; MG/100ML; MG/100ML
INJECTION, SOLUTION INTRAVENOUS CONTINUOUS PRN
Status: DISCONTINUED | OUTPATIENT
Start: 2022-05-23 | End: 2022-05-23

## 2022-05-23 RX ORDER — CEPHALEXIN 500 MG/1
500 CAPSULE ORAL EVERY 6 HOURS SCHEDULED
Qty: 8 CAPSULE | Refills: 0 | Status: SHIPPED | OUTPATIENT
Start: 2022-05-23 | End: 2022-05-25

## 2022-05-23 RX ORDER — CHLORHEXIDINE GLUCONATE 4 G/100ML
SOLUTION TOPICAL DAILY PRN
Status: DISCONTINUED | OUTPATIENT
Start: 2022-05-23 | End: 2022-05-23 | Stop reason: HOSPADM

## 2022-05-23 RX ORDER — HYDROCODONE BITARTRATE AND ACETAMINOPHEN 5; 325 MG/1; MG/1
1 TABLET ORAL EVERY 6 HOURS PRN
Qty: 12 TABLET | Refills: 0 | Status: SHIPPED | OUTPATIENT
Start: 2022-05-23 | End: 2022-05-25 | Stop reason: SDUPTHER

## 2022-05-23 RX ORDER — CHLORHEXIDINE GLUCONATE 0.12 MG/ML
15 RINSE ORAL ONCE
Status: COMPLETED | OUTPATIENT
Start: 2022-05-23 | End: 2022-05-23

## 2022-05-23 RX ORDER — VANCOMYCIN HYDROCHLORIDE 1 G/20ML
INJECTION, POWDER, LYOPHILIZED, FOR SOLUTION INTRAVENOUS AS NEEDED
Status: DISCONTINUED | OUTPATIENT
Start: 2022-05-23 | End: 2022-05-23 | Stop reason: HOSPADM

## 2022-05-23 RX ORDER — EPHEDRINE SULFATE 50 MG/ML
INJECTION INTRAVENOUS AS NEEDED
Status: DISCONTINUED | OUTPATIENT
Start: 2022-05-23 | End: 2022-05-23

## 2022-05-23 RX ORDER — FENTANYL CITRATE 50 UG/ML
INJECTION, SOLUTION INTRAMUSCULAR; INTRAVENOUS AS NEEDED
Status: DISCONTINUED | OUTPATIENT
Start: 2022-05-23 | End: 2022-05-23

## 2022-05-23 RX ORDER — ONDANSETRON 2 MG/ML
INJECTION INTRAMUSCULAR; INTRAVENOUS AS NEEDED
Status: DISCONTINUED | OUTPATIENT
Start: 2022-05-23 | End: 2022-05-23

## 2022-05-23 RX ORDER — BUPIVACAINE HYDROCHLORIDE 5 MG/ML
INJECTION, SOLUTION PERINEURAL
Status: COMPLETED | OUTPATIENT
Start: 2022-05-23 | End: 2022-05-23

## 2022-05-23 RX ORDER — CEFAZOLIN SODIUM 2 G/50ML
2000 SOLUTION INTRAVENOUS ONCE
Status: COMPLETED | OUTPATIENT
Start: 2022-05-23 | End: 2022-05-23

## 2022-05-23 RX ORDER — LIDOCAINE HYDROCHLORIDE 20 MG/ML
INJECTION, SOLUTION EPIDURAL; INFILTRATION; INTRACAUDAL; PERINEURAL AS NEEDED
Status: DISCONTINUED | OUTPATIENT
Start: 2022-05-23 | End: 2022-05-23

## 2022-05-23 RX ORDER — PROPOFOL 10 MG/ML
INJECTION, EMULSION INTRAVENOUS AS NEEDED
Status: DISCONTINUED | OUTPATIENT
Start: 2022-05-23 | End: 2022-05-23

## 2022-05-23 RX ORDER — MAGNESIUM HYDROXIDE 1200 MG/15ML
LIQUID ORAL AS NEEDED
Status: DISCONTINUED | OUTPATIENT
Start: 2022-05-23 | End: 2022-05-23 | Stop reason: HOSPADM

## 2022-05-23 RX ADMIN — EPHEDRINE SULFATE 10 MG: 50 INJECTION, SOLUTION INTRAVENOUS at 08:48

## 2022-05-23 RX ADMIN — LIDOCAINE HYDROCHLORIDE 100 MG: 20 INJECTION, SOLUTION EPIDURAL; INFILTRATION; INTRACAUDAL; PERINEURAL at 08:23

## 2022-05-23 RX ADMIN — PROPOFOL 200 MG: 10 INJECTION, EMULSION INTRAVENOUS at 08:23

## 2022-05-23 RX ADMIN — FENTANYL CITRATE 50 MCG: 50 INJECTION, SOLUTION INTRAMUSCULAR; INTRAVENOUS at 08:29

## 2022-05-23 RX ADMIN — FENTANYL CITRATE 25 MCG: 50 INJECTION, SOLUTION INTRAMUSCULAR; INTRAVENOUS at 09:54

## 2022-05-23 RX ADMIN — BUPIVACAINE 15 ML: 13.3 INJECTION, SUSPENSION, LIPOSOMAL INFILTRATION at 07:38

## 2022-05-23 RX ADMIN — CEFAZOLIN SODIUM 2000 MG: 2 SOLUTION INTRAVENOUS at 08:18

## 2022-05-23 RX ADMIN — MIDAZOLAM HYDROCHLORIDE 2 MG: 1 INJECTION, SOLUTION INTRAMUSCULAR; INTRAVENOUS at 07:35

## 2022-05-23 RX ADMIN — DEXAMETHASONE SODIUM PHOSPHATE 10 MG: 10 INJECTION INTRAMUSCULAR; INTRAVENOUS at 08:28

## 2022-05-23 RX ADMIN — BUPIVACAINE HYDROCHLORIDE 15 ML: 5 INJECTION, SOLUTION PERINEURAL at 07:38

## 2022-05-23 RX ADMIN — TRANEXAMIC ACID 1000 MG: 1 INJECTION, SOLUTION INTRAVENOUS at 08:33

## 2022-05-23 RX ADMIN — ONDANSETRON 4 MG: 2 INJECTION INTRAMUSCULAR; INTRAVENOUS at 09:22

## 2022-05-23 RX ADMIN — FENTANYL CITRATE 25 MCG: 50 INJECTION, SOLUTION INTRAMUSCULAR; INTRAVENOUS at 09:22

## 2022-05-23 RX ADMIN — SODIUM CHLORIDE, SODIUM LACTATE, POTASSIUM CHLORIDE, AND CALCIUM CHLORIDE: .6; .31; .03; .02 INJECTION, SOLUTION INTRAVENOUS at 08:18

## 2022-05-23 RX ADMIN — Medication 15 ML: at 07:44

## 2022-05-23 NOTE — OP NOTE
OPERATIVE REPORT  PATIENT NAME: Jaelyn Livingston    :  1980  MRN: [id number]  Pt Location: [facility] OR ROOM 01    SURGERY DATE: 2022    Surgeon(s) and Role:     * Teresita Wolff - Primary     * Jesus Pierson MD - [id number] Peng PA-C - Assisting    Preop Diagnosis:  Closed displaced fracture of head of right radius, initial encounter [S52 121A]    Post-Op Diagnosis Codes:     * Closed displaced fracture of head of right radius, initial encounter [S52 121A]    Procedure(s) (LRB):  ARTHROPLASTY RADIAL HEAD (Right)    Specimen(s):  * No specimens in log *    Estimated Blood Loss:   10 mL    Drains:  * No LDAs found *    Anesthesia Type:   General w/ Interscalene Block    Operative Indications:  Closed displaced fracture of head of right radius, initial encounter [S52 121A]      Operative Findings:  Comminuted intra-articular radial head fracture    Complications:   None    Procedure and Technique:  The patient was seen in the preoperative holding area where the operative extremity was marked  The patient was taken to the operating room where the patient was placed in the supine position  The operative extremity was prepped and draped in usual sterile fashion  A time-out was called and the patient was administered Ancef 2 g IV prior to incision  Using a 15 blade knife incision was made over the lateral epicondyle down to the radial head  Subcutaneous dissection was taken down to the fascia  The fascia was incised  The [facility] was exposed and an EDC split approach was performed  The entire ligament was then split revealing the radial head  The radial head was comminuted with more than 3 fragments  Fragments were removed using a Kocher instrument  The radial neck was then osteotomized at the neck head junction using an oscillating micro sagittal saw  A retractor was placed posterior to the radial neck and was laterally translated out of the wound    The intramedullary canal was then sounded and broached up to a 5 5 mm broach  The broach stem was left in place and a calcar Reamer was used to smooth the proximal end of the radius  The radial head fragments were piece together on the back table and was shown to be a size 20 mm implant  A trial implant with a 5 5 mm stem and 20 mm head was placed into the canal under fluoroscopic imaging  This showed to be in good position with no signs of over stuffing of the humeral head  Range of motion demonstrated a stable implant with good tracking along the capitellum  The trial implants were removed and the final implant was assembled on the back table  Prior to placing the final implant the joint was copiously irrigated to make sure there is no loose bodies in the joint  The final implant was then placed down the radial canal and reduced against the capitellum  Intraoperative fluoro demonstrated again a well placed implant with no signs of over stuffing and good stability with range of motion  The annular ligament was repaired with 2-0 Vicryl  The EDC split was then closed with 2-0 Vicryl in a running fashion  The fascia was then closed with 2-0 Vicryl in a running fashion  Subcutaneous tissues closed with 2-0 Monocryl and 3-0 Monocryl  Exofin was placed  Mepilex dressing was placed  Ace bandages in place  There is no complications case  Patient is placed in a regular sling     I was present for the entire procedure and A physician assistant was required during the procedure for retraction tissue handling,dissection and suturing    Patient Disposition:  PACU       SIGNATURE: Teresita Wolff  DATE: May 23, 2022  TIME: 9:58 AM

## 2022-05-23 NOTE — DISCHARGE INSTRUCTIONS
Teresita Wolff DO    Orthopedic Surgery, Shoulder/Elbow and Sports Medicine  [facility]   [address] [facility], [address] [place] [address] [place]  Phone: [phone]    General Post-op Surgical Instructions:    Date of Procedure - 05/23/2022    Procedure - Right radial head arthroplasty     Weight Bearing Status - No weight bearing right upper extremity    DVT Prophylaxis and Duration - None needed    PT/OT Instructions - Therapy will be discussed at your first postop visit    Stitches/Staples removed at 7-10 days postop; place steristrips on incision site    Wound Care - Leave dressing clean, dry and intact until your postop visit    Xray follow up - to be done at or prior to office visit follow-up if indicated    Additional Info - Please complete your antibiotics as instructed    Any questions or concerns please call [phone] please!

## 2022-05-23 NOTE — PERIOPERATIVE NURSING NOTE
1000 Pt received from OR and placed on monitor  VSS  AAO and appropriate  Right arm in sling elevated as well as ice pack applied  Fingers good capillary refill  Able to move fingers slowly and purposefully  States still numb but some feeling  Dr Karlos Menendez at bedside  Offered ice chips and taken  Denies pain or discomfort at this time  Repsotions self

## 2022-05-23 NOTE — ANESTHESIA POSTPROCEDURE EVALUATION
Post-Op Assessment Note    CV Status:  Stable  Pain Score: 0    Pain management: adequate     Mental Status:  Alert and awake   Hydration Status:  Euvolemic   PONV Controlled:  Controlled   Airway Patency:  Patent      Post Op Vitals Reviewed: Yes      Staff: Anesthesiologist, CRNA         No complications documented      /60 (05/23/22 0957)    Temp      Pulse 97 (05/23/22 0957)   Resp 16 (05/23/22 0957)    SpO2 98 % (05/23/22 0957)

## 2022-05-23 NOTE — INTERVAL H&P NOTE
H&P reviewed  After examining the patient I find no changes in the patients condition since the H&P had been written      Vitals:    05/23/22 0741   BP: 130/62   Pulse: 66   Resp: 18   Temp: 97 9 °F (36 6 °C)   SpO2: 100%

## 2022-05-23 NOTE — ANESTHESIA PREPROCEDURE EVALUATION
Procedure:  Open Reduction Internal fixation Radial head vs  ARTHROPLASTY RADIAL HEAD (Right Wrist)    Relevant Problems   CARDIO   (+) Other chest pain   (+) Paroxysmal atrial fibrillation (HCC)        Physical Exam    Airway    Mallampati score: II  TM Distance: >3 FB  Neck ROM: full     Dental   No notable dental hx     Cardiovascular  Cardiovascular exam normal    Pulmonary  Pulmonary exam normal     Other Findings        Anesthesia Plan  ASA Score- 2     Anesthesia Type- general with ASA Monitors  Additional Monitors:   Airway Plan: LMA  Plan Factors-Exercise tolerance (METS): >4 METS  Chart reviewed  Imaging results reviewed  Existing labs reviewed  Patient summary reviewed  Patient is not a current smoker  Induction- intravenous  Postoperative Plan- Plan for postoperative opioid use  Informed Consent- Anesthetic plan and risks discussed with patient  I personally reviewed this patient with the CRNA  Discussed and agreed on the Anesthesia Plan with the CRNA  Tami Barboza

## 2022-05-23 NOTE — ANESTHESIA PROCEDURE NOTES
Peripheral Block    Patient location during procedure: pre-op  Start time: 5/23/2022 7:35 AM  Reason for block: at surgeon's request and post-op pain management  Staffing  Performed: Anesthesiologist   Anesthesiologist: Cheri [place] MD  Preanesthetic Checklist  Completed: patient identified, IV checked, site marked, risks and benefits discussed, surgical consent, monitors and equipment checked, pre-op evaluation and timeout performed  Peripheral Block  Patient position: supine  Prep: ChloraPrep  Patient monitoring: heart rate, continuous pulse ox and frequent blood pressure checks  Block type: interscalene  Laterality: right  Injection technique: single-shot  Procedures: ultrasound guided, Ultrasound guidance required for the procedure to increase accuracy and safety of medication placement and decrease risk of complications    Ultrasound permanent image savedbupivacaine (MARCAINE) 0 5 % - Perineural   15 mL - 5/23/2022 7:38:00 AM (exparel 15 cc)  Needle  Needle type: Stimuplex   Needle gauge: 22 G  Needle length: 10 cm  Needle localization: ultrasound guidance  Assessment  Injection assessment: incremental injection, local visualized surrounding nerve on ultrasound, negative aspiration for heme and no paresthesia on injection  Paresthesia pain: none  Heart rate change: no  Slow fractionated injection: yes  Post-procedure:  site cleaned  patient tolerated the procedure well with no immediate complications

## 2022-05-25 ENCOUNTER — TELEPHONE (OUTPATIENT)
Dept: OBGYN CLINIC | Facility: MEDICAL CENTER | Age: 42
End: 2022-05-25

## 2022-05-25 DIAGNOSIS — Z47.89 AFTERCARE FOLLOWING SURGERY OF THE MUSCULOSKELETAL SYSTEM: ICD-10-CM

## 2022-05-25 RX ORDER — HYDROCODONE BITARTRATE AND ACETAMINOPHEN 5; 325 MG/1; MG/1
1 TABLET ORAL EVERY 6 HOURS PRN
Qty: 12 TABLET | Refills: 0 | Status: SHIPPED | OUTPATIENT
Start: 2022-05-25 | End: 2022-05-28

## 2022-05-25 NOTE — TELEPHONE ENCOUNTER
Patient sees Dr Kenneth Moser  Patient is calling for a refill for Hydrocodone-acetaminophen 5/325 mg, has enough for today, she uses [facility] [place] on file  Please call once script was sent      CB # [phone]

## 2022-06-01 ENCOUNTER — HOSPITAL ENCOUNTER (OUTPATIENT)
Dept: RADIOLOGY | Facility: HOSPITAL | Age: 42
Discharge: HOME | End: 2022-06-01
Attending: ORTHOPAEDIC SURGERY
Payer: COMMERCIAL

## 2022-06-01 ENCOUNTER — OFFICE VISIT (OUTPATIENT)
Dept: OBGYN CLINIC | Facility: CLINIC | Age: 42
End: 2022-06-01

## 2022-06-01 VITALS
BODY MASS INDEX: 23.78 KG/M2 | HEART RATE: 91 BPM | OXYGEN SATURATION: 98 % | SYSTOLIC BLOOD PRESSURE: 122 MMHG | WEIGHT: 148 LBS | HEIGHT: 66 IN | DIASTOLIC BLOOD PRESSURE: 78 MMHG

## 2022-06-01 DIAGNOSIS — M25.531 PAIN IN RIGHT WRIST: ICD-10-CM

## 2022-06-01 DIAGNOSIS — S63.501A SPRAIN OF RIGHT WRIST, INITIAL ENCOUNTER: ICD-10-CM

## 2022-06-01 DIAGNOSIS — Z47.89 AFTERCARE FOLLOWING SURGERY OF THE MUSCULOSKELETAL SYSTEM: Primary | ICD-10-CM

## 2022-06-01 DIAGNOSIS — Z47.89 AFTERCARE FOLLOWING SURGERY OF THE MUSCULOSKELETAL SYSTEM: ICD-10-CM

## 2022-06-01 PROCEDURE — 73080 X-RAY EXAM OF ELBOW: CPT

## 2022-06-01 PROCEDURE — 99024 POSTOP FOLLOW-UP VISIT: CPT | Performed by: ORTHOPAEDIC SURGERY

## 2022-06-01 PROCEDURE — 73100 X-RAY EXAM OF WRIST: CPT

## 2022-06-01 NOTE — PROGRESS NOTES
[address] [address]  [address]  [id number]  1980    ORTHOPAEDIC SURGERY OUTPATIENT NOTE  2022      HISTORY:  38 y o  female who presents today 9 days s/p right radial head arthroplasty performed on 2022  Patient reports that her elbow pain has improved but she now reports right wrist pain  Patient localizes her symptoms about the radial aspect of the wrist and hand  Pain is worse with motion of the wrist  No numbness or tingling  No fevers or chills  Past Medical History:   Diagnosis Date    A-fib [facility])     no recurrence since  or so    Menorrhagia     Vitamin D deficiency        Past Surgical History:   Procedure Laterality Date     SECTION      RI HYSTEROSCOPY,W/ENDO BX N/A 2021    Procedure: DILATATION AND CURETTAGE (D&C) WITH HYSTEROSCOPY;  Surgeon: Deedee Scott MD;  [facility];  Service: Gynecology    RI HYSTEROSCOPY,W/ENDOMETRIAL ABLATION N/A 2021    Procedure: ABLATION ENDOMETRIAL VIVIANA;  Surgeon: Deedee Scott MD;  [facility];  Service: Gynecology    RI RECONSTRUCT RADIAL HEAD W IMPLANT Right 2022    Procedure: ARTHROPLASTY RADIAL HEAD;  Surgeon: Praful Martines;  Location: [facility];  Service: Orthopedics    RI THYROID LOBECTOMY,UNILAT Right 2017    Procedure: THYROID LOBECTOMY WITH RECURRENT LARYNGEAL NERVE MONITORING (IMPULSE MONITORING);   Surgeon: Real Knight MD;  Location[facility];  Service: ENT       Social History     Socioeconomic History    Marital status: /Civil Union     Spouse name: Not on file    Number of children: Not on file    Years of education: Not on file    Highest education level: Not on file   Occupational History    Not on file   Tobacco Use    Smoking status: Never Smoker    Smokeless tobacco: Never Used   Vaping Use    Vaping Use: Never used   Substance and Sexual Activity    Alcohol use: Yes     Comment: 4 drinks per week    Drug use: No    Sexual activity: Yes     Partners: Male     Birth control/protection: Male Sterilization   Other Topics Concern    Not on file   Social History Narrative    Not on file     Social Determinants of Health     Financial Resource Strain: Not on file   Food Insecurity: Not on file   Transportation Needs: Not on file   Physical Activity: Not on file   Stress: Not on file   Social Connections: Not on file   Intimate Partner Violence: Not on file   Housing Stability: Not on file       Family History   Problem Relation Age of Onset    Thyroid disease Mother     Breast cancer Maternal Grandmother     Breast cancer Maternal Aunt         Patient's Medications   New Prescriptions    No medications on file   Previous Medications    ACETAMINOPHEN (TYLENOL) 650 MG CR TABLET    Take 1 tablet (650 mg total) by mouth every 8 (eight) hours as needed for mild pain    ESCITALOPRAM (LEXAPRO) 5 MG TABLET    Take 5 mg by mouth daily at bedtime     IBUPROFEN (MOTRIN) 600 MG TABLET    Take 1 tablet (600 mg total) by mouth every 6 (six) hours as needed for mild pain    ONDANSETRON (ZOFRAN-ODT) 4 MG DISINTEGRATING TABLET    Take 1 tablet (4 mg total) by mouth every 8 (eight) hours as needed for nausea or vomiting   Modified Medications    No medications on file   Discontinued Medications    No medications on file       No Known Allergies     /78   Pulse 91   Ht 5' 6" (1 676 m)   Wt 67 1 kg (148 lb)   LMP 05/23/2022   SpO2 98%   BMI 23 89 kg/m²      REVIEW OF SYSTEMS:  Constitutional: Negative  HEENT: Negative  Respiratory: Negative  Skin: Negative  Neurological: Negative  Psychiatric/Behavioral: Negative  Musculoskeletal: Negative except for that mentioned in the HPI  PHYSICAL EXAM: Right Elbow: Skin is intact  No erythema  Incision is C/D/I without signs of infection  PROM limited in all planes due to pain   Sensation is intact distally  2+ radial pulse    Right Wrist: Skin is intact  No erythema  TTP about the radial aspect of the wrist and thumb  ROM is intact in all planes with pain  Sensation is intact distally  2+ radial pulse  IMAGING: X Ray Right Wrist 6/1/2022: No acute osseous abnormalities or degenerative changes  X Ray Right Elbow 6/1/2022: Radial head arthroplasty in stable alignment and position without complications  No other acute osseous abnormalities  ASSESSMENT AND PLAN:  38 y o  female who presents today 9 days s/p right radial head arthroplasty performed on 5/23/2022  Patient progressing post operatively  She will begin formal OT/HEP for the elbow with ROM and strengthening exercises as tolerated  As for her right wrist, it appears she has a bad wrist sprain from her fall  She may try bracing as needed as well as OTC meds and ice prn for pain relief  Follow up in 4 weeks for re evaluation of symptoms and repeat x-ray       Scribe Attestation    I,:  Geovanni Salomon am acting as a scribe while in the presence of the attending physician :       I,:  Nate Esposito personally performed the services described in this documentation    as scribed in my presence :

## 2022-06-06 ENCOUNTER — EVALUATION (OUTPATIENT)
Dept: PHYSICAL THERAPY | Facility: CLINIC | Age: 42
End: 2022-06-06
Payer: COMMERCIAL

## 2022-06-06 DIAGNOSIS — Z47.89 AFTERCARE FOLLOWING SURGERY OF THE MUSCULOSKELETAL SYSTEM: Primary | ICD-10-CM

## 2022-06-06 DIAGNOSIS — M25.521 RIGHT ELBOW PAIN: ICD-10-CM

## 2022-06-06 PROCEDURE — 97162 PT EVAL MOD COMPLEX 30 MIN: CPT | Performed by: PHYSICAL THERAPIST

## 2022-06-06 PROCEDURE — 97112 NEUROMUSCULAR REEDUCATION: CPT | Performed by: PHYSICAL THERAPIST

## 2022-06-06 NOTE — PROGRESS NOTES
PT Evaluation      Today's date: 2022  Patient name: Mushtaq Sánchez  : 1980  MRN: [id number]  Referring provider: Lisa German DO  Dx:   Encounter Diagnosis     ICD-10-CM    1  Aftercare following surgery of the musculoskeletal system  Z47 89 Ambulatory Referral to PT/OT Hand Therapy   2  Right elbow pain  M25 521        Start Time: 1445  Stop Time: 1530  Total time in clinic (min): 45 minutes    Assessment  Assessment details: Mushtaq Sánchez is a pleasant 38 y o  female who presents s/p radial head replacement performed 22  The primary movement problem is expected post surgical ROM and strength deficits resulting in pain in the right elbow and limiting her ability to carry, dress independently, eat, exercise or recreation, lift, reach overhead and wash behind the back  No further referral appears necessary at this time based upon examination results  I expect she will progress well with PT 2x/wk for 6-8 wks and compliance to HEP  The patient's greatest concerns are the pain he is experiencing and fear of not being able to keep active  Problem List:  1) Joint and soft tissue restriction in the right elbow and UE  2) Decreased mm strength of the right elbow and UE     Etiologic factors include none recalled by the patient  Impairments: abnormal or restricted ROM, activity intolerance, impaired physical strength, lacks appropriate home exercise program, pain with function and weight-bearing intolerance    Symptom irritability: lowUnderstanding of Dx/Px/POC: good   Prognosis: good  Prognosis details: Positive prognostic indicators include positive attitude toward recovery and good understanding of diagnosis and treatment plan options  Negative prognostic indicators include none  Goals  Short Term Goals 2-4 wks   1  Pt will be independent with initial HEP   2  Pt will improve AROM of the right elbow/UE to WNL and pain free  3   Pt will decrease pain to < 2/10 at worst     Long Term Goals 6-8 wks  1  Pt will improve deficient mm strength to 5/5 t/o   2  Pt will report no limitations with ADLs, lifting, carrying   3  Pt will improve FOTO score to >66 by d/c       Plan  Patient would benefit from: PT eval and skilled physical therapy  Referral necessary: No  Planned therapy interventions: joint mobilization, manual therapy, massage, neuromuscular re-education, patient education, postural training, therapeutic activities, therapeutic exercise, home exercise program, graded exercise and graded activity  Frequency: 2x week  Duration in weeks: 6        Subjective Evaluation    History of Present Illness  Date of onset: 5/15/2022  Date of surgery: 5/23/2022  Mechanism of injury: surgery  Mechanism of injury: Ramos sheffield s/p radial head replacement performed 5/23/22  Symptoms began 5/15/22 with mechanism of injury: fall on an outstretched hand  Pt went to ED same day, had x-rays which showed comminuted fracture of the right radial head  Pt had consult with orthopedic surgery and elected to proceed with radial head replacement  Pt had surgery on 5/23 and was d/c same day  Had first f/u on 6/1 with good healing reported at that point  Referred to OPPT  Pt is right handed  Some difficulty reported with ADLs, including cooking, putting a bra on, tying her shoes  Current restrictions include AROM as tolerated, sling for comfort, no strengthening until 6/29  Next f/u with surgeon 6/29  Denies altered sensation, N/T in the right UE    Aggravating factors: active movement of the right elbow and UE  Relieving factors: Tylenol prn  24hr pain pattern: 2/10 (current), 0/10 (best), 5/10 (worst), location: right pain, descriptors: aching   Imaging: X-ray post op (6/1/22)  Previous treatments: Surgery   Occupation/recreation: Behavioral Therapist, 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; enjoys gardening, 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, cooking   Primary concern: pain and limitation   Patient goals: decrease pain, return to normal function           Objective     Active Range of Motion     Right Elbow   Flexion: 85 degrees   Extension: 135 degrees   Forearm supination: 20 degrees   Forearm pronation: 30 degrees     Right Wrist   Wrist flexion: 60 degrees   Wrist extension: 20 degrees   Radial deviation: 15 degrees   Ulnar deviation: 20 degrees     Additional Active Range of Motion Details  Lacking 45* of extension of the right elbow     Strength/Myotome Testing     Additional Strength Details  Strength NT 2* post surgical precautions              Precautions: S/P Right Radial Head Replacement (5/23/22)  No strengthening until 6/29/22    Access Code: [email or web address]     Manuals 6/6            R Elbow PROM             R Elbow STM                                       Neuro Re-Ed             Nerve Glides              Scap Retract              Prone I/T/Y                                       Education  S/S, POC, HEP            Ther Ex             Ext Stretch at [address] 10x            Elbow Flex/Ext w/ Towel 10x             Ext/Sup Stretch at Wall 10x             Wrist AROM                                       Ther Activity                                       Gait Training                                       Modalities

## 2022-06-10 ENCOUNTER — OFFICE VISIT (OUTPATIENT)
Dept: PHYSICAL THERAPY | Facility: CLINIC | Age: 42
End: 2022-06-10
Payer: COMMERCIAL

## 2022-06-10 DIAGNOSIS — M25.521 RIGHT ELBOW PAIN: ICD-10-CM

## 2022-06-10 DIAGNOSIS — Z47.89 AFTERCARE FOLLOWING SURGERY OF THE MUSCULOSKELETAL SYSTEM: Primary | ICD-10-CM

## 2022-06-10 PROCEDURE — 97140 MANUAL THERAPY 1/> REGIONS: CPT | Performed by: PHYSICAL THERAPIST

## 2022-06-10 PROCEDURE — 97110 THERAPEUTIC EXERCISES: CPT | Performed by: PHYSICAL THERAPIST

## 2022-06-10 NOTE — PROGRESS NOTES
Daily Note     Today's date: 6/10/2022  Patient name: Zoe Warren  : 1980  MRN: [id number]  Referring provider: Butch Young DO  Dx:   Encounter Diagnosis     ICD-10-CM    1  Aftercare following surgery of the musculoskeletal system  Z47 89    2  Right elbow pain  M25 521        Start Time: 0800  Stop Time: 0845  Total time in clinic (min): 45 minutes    Subjective: Pt reports she was able to complete HEP without complications  No new concerns noted at this time  Objective: See treatment diary below      Assessment: Initiated TE and manual program  Tolerated treatment well  Improved mobility compared to IE and further improvement noted after STM and manual stretching  Advised to continue with HEP  Progress as tolerated per protocol  Patient would benefit from continued PT      Plan: Continue per plan of care        Precautions: S/P Right Radial Head Replacement (22)  No strengthening until 22    Access Code: [email or web address]     Manuals 6/6 6/10           R Elbow PROM  KCB           R Elbow STM  KCB                                     Neuro Re-Ed             Nerve Glides              Scap Retract              Prone I/T/Y             PNF D1/D2  10x ea supine                        Education  S/S, POC, HEP            Ther Ex             Ext Stretch at Wall 10x 20x           Elbow Flex/Ext w/ Towel 10x  20x           Ext/Sup Stretch at Wall 10x  20x           Elbow AROM  20x pro sup           Wrist  20x flex ext                         Ther Activity                                       Gait Training                                       Modalities

## 2022-06-13 ENCOUNTER — OFFICE VISIT (OUTPATIENT)
Dept: PHYSICAL THERAPY | Facility: CLINIC | Age: 42
End: 2022-06-13
Payer: COMMERCIAL

## 2022-06-13 DIAGNOSIS — Z47.89 AFTERCARE FOLLOWING SURGERY OF THE MUSCULOSKELETAL SYSTEM: Primary | ICD-10-CM

## 2022-06-13 DIAGNOSIS — M25.521 RIGHT ELBOW PAIN: ICD-10-CM

## 2022-06-13 PROCEDURE — 97140 MANUAL THERAPY 1/> REGIONS: CPT | Performed by: PHYSICAL THERAPIST

## 2022-06-13 PROCEDURE — 97110 THERAPEUTIC EXERCISES: CPT | Performed by: PHYSICAL THERAPIST

## 2022-06-13 NOTE — PROGRESS NOTES
Daily Note     Today's date: 2022  Patient name: Ramos Paulino  : 1980  MRN: [id number]  Referring provider: Liban Young DO  Dx:   Encounter Diagnosis     ICD-10-CM    1  Aftercare following surgery of the musculoskeletal system  Z47 89    2  Right elbow pain  M25 521        Start Time: 1210  Stop Time: 1255  Total time in clinic (min): 45 minutes    Subjective: Pt with no new concerns noted at this time  Continues to perform HEP as instructed without complications  Objective: See treatment diary below      Assessment: Tolerated treatment well  Continued with current POC with good tolerance from the patient  No significant pain reported with exercises  Improved mobility noted in the right elbow but continues to lack full PROM of the right elbow  Advised to continue with HEP  Progress as tolerated  Patient would benefit from continued PT      Plan: Continue per plan of care        Precautions: S/P Right Radial Head Replacement (22)  No strengthening until 22    Access Code: [email or web address]     Manuals 6/6 6/10 6/13        R Elbow PROM  KCB KCB        R Elbow STM  KCB KCB                              Neuro Re-Ed           Nerve Glides            Scap Retract            Prone I/T/Y   20x ea        PNF D1/D2  10x ea supine 10x ea supine                   Education  S/S, POC, HEP          Ther Ex           Ext Stretch at Wall 10x 20x 20x        Elbow Flex/Ext w/ Towel 10x  20x 20x        Ext/Sup Stretch at Wall 10x  20x 20x        Elbow AROM  20x pro sup 20x pro sup        Wrist  20x flex ext  20x flex ext         Wall Slides   10x                    Ther Activity                                 Gait Training                                 Modalities

## 2022-06-16 ENCOUNTER — APPOINTMENT (OUTPATIENT)
Dept: PHYSICAL THERAPY | Facility: CLINIC | Age: 42
End: 2022-06-16
Payer: COMMERCIAL

## 2022-06-20 ENCOUNTER — OFFICE VISIT (OUTPATIENT)
Dept: PHYSICAL THERAPY | Facility: CLINIC | Age: 42
End: 2022-06-20
Payer: COMMERCIAL

## 2022-06-20 DIAGNOSIS — Z47.89 AFTERCARE FOLLOWING SURGERY OF THE MUSCULOSKELETAL SYSTEM: Primary | ICD-10-CM

## 2022-06-20 DIAGNOSIS — M25.521 RIGHT ELBOW PAIN: ICD-10-CM

## 2022-06-20 PROCEDURE — 97110 THERAPEUTIC EXERCISES: CPT | Performed by: PHYSICAL THERAPIST

## 2022-06-20 PROCEDURE — 97112 NEUROMUSCULAR REEDUCATION: CPT | Performed by: PHYSICAL THERAPIST

## 2022-06-20 PROCEDURE — 97140 MANUAL THERAPY 1/> REGIONS: CPT | Performed by: PHYSICAL THERAPIST

## 2022-06-20 NOTE — PROGRESS NOTES
Daily Note     Today's date: 2022  Patient name: Jesenia Queen  : 1980  MRN: [id number]  Referring provider: Yvette Curry DO  Dx:   Encounter Diagnosis     ICD-10-CM    1  Aftercare following surgery of the musculoskeletal system  Z47 89    2  Right elbow pain  M25 521        Start Time: 1215  Stop Time: 1255  Total time in clinic (min): 40 minutes    Subjective: Pt with no new concerns noted at this time  Objective: See treatment diary below      Assessment: Tolerated treatment well  Continued with current POC with good tolerance from the patient  Continues to gradually improve with ROM of the right elbow, but lacks full passive and active ROM of the right elbow  Progress as tolerated  Patient would benefit from continued PT      Plan: Continue per plan of care        Precautions: S/P Right Radial Head Replacement (22)  No strengthening until 22    Access Code: [email or web address]     Manuals 6/6 6/10 6/13 6/20       R Elbow PROM  KCB KCB KCB       R Elbow STM  KCB KCB KCB                             Neuro Re-Ed           Nerve Glides            Scap Retract            Prone I/T/Y   20x ea 20x ea       PNF D1/D2  10x ea supine 10x ea supine 10x ea supine                  Education  S/S, POC, HEP          Ther Ex           Ext Stretch at Wall 10x 20x 20x 20x       Elbow Flex/Ext w/ Towel 10x  20x 20x 20x       Ext/Sup Stretch at Wall 10x  20x 20x 20x       Elbow AROM  20x pro sup 20x pro sup 20x pro sup       Wrist  20x flex ext  20x flex ext  20x flex ext       Wall Slides   10x  10x                  Ther Activity                                 Gait Training                                 Modalities

## 2022-06-23 ENCOUNTER — APPOINTMENT (OUTPATIENT)
Dept: PHYSICAL THERAPY | Facility: CLINIC | Age: 42
End: 2022-06-23
Payer: COMMERCIAL

## 2022-06-27 ENCOUNTER — OFFICE VISIT (OUTPATIENT)
Dept: PHYSICAL THERAPY | Facility: CLINIC | Age: 42
End: 2022-06-27
Payer: COMMERCIAL

## 2022-06-27 DIAGNOSIS — Z47.89 AFTERCARE FOLLOWING SURGERY OF THE MUSCULOSKELETAL SYSTEM: Primary | ICD-10-CM

## 2022-06-27 DIAGNOSIS — M25.521 RIGHT ELBOW PAIN: ICD-10-CM

## 2022-06-27 PROCEDURE — 97112 NEUROMUSCULAR REEDUCATION: CPT | Performed by: PHYSICAL THERAPIST

## 2022-06-27 PROCEDURE — 97110 THERAPEUTIC EXERCISES: CPT | Performed by: PHYSICAL THERAPIST

## 2022-06-27 PROCEDURE — 97140 MANUAL THERAPY 1/> REGIONS: CPT | Performed by: PHYSICAL THERAPIST

## 2022-06-27 NOTE — PROGRESS NOTES
Daily Note     Today's date: 2022  Patient name: Sandy Tang  : 1980  MRN: [id number]  Referring provider: Caitlyn Bella DO  Dx:   Encounter Diagnosis     ICD-10-CM    1  Aftercare following surgery of the musculoskeletal system  Z47 89    2  Right elbow pain  M25 521        Start Time: 1405  Stop Time: 1450  Total time in clinic (min): 45 minutes    Subjective: Pt with no new concerns noted at this time  Pt to f/u with surgeon on   Objective: See treatment diary below      Assessment: Tolerated treatment well  Continued with current POC with good tolerance from the patient  Added additional mobility exercises to POC with good tolerance from the patient  No significant pain reported with exercises  Continue to progress as tolerated  Patient would benefit from continued PT      Plan: Continue per plan of care        Precautions: S/P Right Radial Head Replacement (22)  No strengthening until 22    Access Code: [email or web address]     Manuals 6/6 6/10 6/13 6/20 6/27      R Elbow PROM  [facility]      R Elbow STM  [address] [facility]                            Neuro Re-Ed           Nerve Glides      Med N  10x supine      Scap Retract            Prone I/T/Y   20x ea 20x ea 20x ea      PNF D1/D2  10x ea supine 10x ea supine 10x ea supine 10x ea supine                 Education  S/S, POC, HEP          Ther Ex           Ext Stretch at [address] 10x 20x 20x 20x 2x10      Elbow Flex/Ext w/ Towel 10x  20x 20x 20x 2x10      Ext/Sup Stretch at Wall 10x  20x 20x 20x 2x10      Elbow AROM  20x pro sup 20x pro sup 20x pro sup 20x pro sup      Wrist  20x flex ext  20x flex ext  20x flex ext 20x flex ext      Wall Slides   10x  10x 2x10      Plinth Wrist Ext Stretch     10x      Plinth Rock Backs     10x                 Ther Activity                                 Gait Training                                 Modalities

## 2022-06-30 ENCOUNTER — HOSPITAL ENCOUNTER (OUTPATIENT)
Dept: RADIOLOGY | Facility: HOSPITAL | Age: 42
Discharge: HOME | End: 2022-06-30
Attending: ORTHOPAEDIC SURGERY
Payer: COMMERCIAL

## 2022-06-30 ENCOUNTER — OFFICE VISIT (OUTPATIENT)
Dept: PHYSICAL THERAPY | Facility: CLINIC | Age: 42
End: 2022-06-30
Payer: COMMERCIAL

## 2022-06-30 ENCOUNTER — TELEPHONE (OUTPATIENT)
Dept: OBGYN CLINIC | Facility: MEDICAL CENTER | Age: 42
End: 2022-06-30

## 2022-06-30 ENCOUNTER — OFFICE VISIT (OUTPATIENT)
Dept: OBGYN CLINIC | Facility: CLINIC | Age: 42
End: 2022-06-30

## 2022-06-30 VITALS
DIASTOLIC BLOOD PRESSURE: 68 MMHG | SYSTOLIC BLOOD PRESSURE: 118 MMHG | HEIGHT: 66 IN | OXYGEN SATURATION: 95 % | WEIGHT: 148 LBS | BODY MASS INDEX: 23.78 KG/M2 | HEART RATE: 82 BPM

## 2022-06-30 DIAGNOSIS — Z47.89 AFTERCARE FOLLOWING SURGERY OF THE MUSCULOSKELETAL SYSTEM: Primary | ICD-10-CM

## 2022-06-30 DIAGNOSIS — M25.521 RIGHT ELBOW PAIN: ICD-10-CM

## 2022-06-30 DIAGNOSIS — S52.121A CLOSED DISPLACED FRACTURE OF HEAD OF RIGHT RADIUS, INITIAL ENCOUNTER: Primary | ICD-10-CM

## 2022-06-30 DIAGNOSIS — Z47.89 AFTERCARE FOLLOWING SURGERY OF THE MUSCULOSKELETAL SYSTEM: ICD-10-CM

## 2022-06-30 PROCEDURE — 99024 POSTOP FOLLOW-UP VISIT: CPT | Performed by: ORTHOPAEDIC SURGERY

## 2022-06-30 PROCEDURE — 97112 NEUROMUSCULAR REEDUCATION: CPT | Performed by: PHYSICAL THERAPIST

## 2022-06-30 PROCEDURE — 97140 MANUAL THERAPY 1/> REGIONS: CPT | Performed by: PHYSICAL THERAPIST

## 2022-06-30 PROCEDURE — 97110 THERAPEUTIC EXERCISES: CPT | Performed by: PHYSICAL THERAPIST

## 2022-06-30 PROCEDURE — 73080 X-RAY EXAM OF ELBOW: CPT

## 2022-06-30 NOTE — TELEPHONE ENCOUNTER
Patient sees Dr Amy Sutherland  Patient is calling in regards to the script for the Right elbow guillermo splint for arthrofibrosis, she is asking where does she get this for her elbow  She is asking for a call back relating         # [phone]

## 2022-06-30 NOTE — PROGRESS NOTES
Daily Note     Today's date: 2022  Patient name: Puneet Patel  : 1980  MRN: [id number]  Referring provider: Buzz Denson DO  Dx:   Encounter Diagnosis     ICD-10-CM    1  Aftercare following surgery of the musculoskeletal system  Z47 89    2  Right elbow pain  M25 521        Start Time: 1500  Stop Time: 1545  Total time in clinic (min): 45 minutes    Subjective: Pt had f/u with surgeon earlier today  Advised to continue with PT, more aggressive stretching, no other restrictions noted at this time       Objective: See treatment diary below      Assessment: Tolerated treatment well  Progressed POC with good tolerance from the patient  Added light functional strengthening exercises to POC without significant increase in symptoms  Improvement in mobility noted with contract relax PNF stretching  Educated on LLLD for extension of the right elbow  Continue to progress as tolerated  Patient would benefit from continued PT      Plan: Continue per plan of care        Precautions: S/P Right Radial Head Replacement (22)    Access Code: [email or web address]     Manuals 6/6 6/10 6/13 6/20 6/27 6/30     R Elbow PROM  [address] [place] [address] Blvd     R Elbow STM  [address] [place], [place] [address]vd                           Neuro Re-Ed           Nerve Glides      Med N  10x supine      Scap Retract            Prone I/T/Y   20x ea 20x ea 20x ea      PNF D1/D2  10x ea supine 10x ea supine 10x ea supine 10x ea supine 10x D2 YTB     TB OH Ext w/ Row      10x YTB     TB Low Row w/ Sup      10x YTB                           Education  S/S, POC, HEP          Ther Ex           Ext Stretch at [place] 10x 20x 20x 20x 2x10 2x10     Elbow Flex/Ext w/ Towel 10x  20x 20x 20x 2x10 2x10     Ext/Sup Stretch at Wall 10x  20x 20x 20x 2x10 2x10     Elbow AROM  20x pro sup 20x pro sup 20x pro sup 20x pro sup 20x pro sup     Wrist  20x flex ext  20x flex ext  20x flex ext 20x flex ext 20x flex ext     Wall Slides   10x  10x 2x10 2x10     Plinth Wrist Ext Stretch 10x 2x10     Plinth Rock Backs     10x 2x10                 Ther Activity                                 Gait Training                                 Modalities

## 2022-06-30 NOTE — PROGRESS NOTES
[address] [address]  [address]  [id number]  1980    ORTHOPAEDIC SURGERY OUTPATIENT NOTE  2022      HISTORY:  38 y o  female who presents to the office today 5 weeks status post right radial head arthroplasty performed on 22  Patient states she is doing well  She states her pain has been improving  She has been compliant with formal therapy and does note improvement  She notes continued pain to the ulnar aspect of her right wrist and into her palm  She states this has been improving however, is slow  She has not been taking anything OTC for pain  She denies any numbness or tingling  Patient states her pain is a 2 out of 10 on a daily basis  Past Medical History:   Diagnosis Date    A-fib [facility])     no recurrence since  or so    Menorrhagia     Vitamin D deficiency        Past Surgical History:   Procedure Laterality Date     SECTION      UT HYSTEROSCOPY,W/ENDO BX N/A 2021    Procedure: DILATATION AND CURETTAGE (D&C) WITH HYSTEROSCOPY;  Surgeon: Robert Brooks MD;  [facility];  Service: Gynecology    UT HYSTEROSCOPY,W/ENDOMETRIAL ABLATION N/A 2021    Procedure: ABLATION ENDOMETRIAL VIVIANA;  Surgeon: Robert Brooks MD;  [facility];  Service: Gynecology    UT RECONSTRUCT RADIAL HEAD W IMPLANT Right 2022    Procedure: ARTHROPLASTY RADIAL HEAD;  Surgeon: Domenico Yi;  Location: [facility];  Service: Orthopedics    UT THYROID LOBECTOMY,UNILAT Right 2017    Procedure: THYROID LOBECTOMY WITH RECURRENT LARYNGEAL NERVE MONITORING (IMPULSE MONITORING);   Surgeon: Jose Antonio Tadeo MD;  Location[facility];  Service: ENT       Social History     Socioeconomic History    Marital status: /Civil Union     Spouse name: Not on file    Number of children: Not on file    Years of education: Not on file    Highest education level: Not on file   Occupational History    Not on file   Tobacco Use    Smoking status: Never Smoker    Smokeless tobacco: Never Used   Vaping Use    Vaping Use: Never used   Substance and Sexual Activity    Alcohol use: Yes     Comment: 4 drinks per week    Drug use: No    Sexual activity: Yes     Partners: Male     Birth control/protection: Male Sterilization   Other Topics Concern    Not on file   Social History Narrative    Not on file     Social Determinants of Health     Financial Resource Strain: Not on file   Food Insecurity: Not on file   Transportation Needs: Not on file   Physical Activity: Not on file   Stress: Not on file   Social Connections: Not on file   Intimate Partner Violence: Not on file   Housing Stability: Not on file       Family History   Problem Relation Age of Onset    Thyroid disease Mother     Breast cancer Maternal Grandmother     Breast cancer Maternal Aunt         Patient's Medications   New Prescriptions    No medications on file   Previous Medications    ACETAMINOPHEN (TYLENOL) 650 MG CR TABLET    Take 1 tablet (650 mg total) by mouth every 8 (eight) hours as needed for mild pain    ESCITALOPRAM (LEXAPRO) 5 MG TABLET    Take 5 mg by mouth daily at bedtime     IBUPROFEN (MOTRIN) 600 MG TABLET    Take 1 tablet (600 mg total) by mouth every 6 (six) hours as needed for mild pain    ONDANSETRON (ZOFRAN-ODT) 4 MG DISINTEGRATING TABLET    Take 1 tablet (4 mg total) by mouth every 8 (eight) hours as needed for nausea or vomiting   Modified Medications    No medications on file   Discontinued Medications    No medications on file       No Known Allergies     /68   Pulse 82   Ht 5' 6" (1 676 m)   Wt 67 1 kg (148 lb)   SpO2 95%   BMI 23 89 kg/m²      REVIEW OF SYSTEMS:  Constitutional: Negative  HEENT: Negative  Respiratory: Negative  Skin: Negative  Neurological: Negative  Psychiatric/Behavioral: Negative    Musculoskeletal: Negative except for that mentioned in the HPI  PHYSICAL EXAM:  Right elbow    Surgical incision well healed  NVI  Compartments soft    IMAGING: X-rays right elbow performed in the office today stable right radial head arthroplasty without signs of failure  ASSESSMENT AND PLAN:  38 y o  female  5 weeks status post right radial head arthroplasty performed on 5/23/22  Patient is doing well postoperatively  X-rays were reviewed  She was instructed to continue with formal therapy for range of motion and strengthening  Patient is aware she may need to work through some pain in therapy  She does have some stiffness on exam  A guillermo splint was ordered today  She can advance her lifting as tolerated  She has 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 at this time       Scribe Attestation    I,:  Jennyfer Thompson MA am acting as a scribe while in the presence of the attending physician :       I,:  Eun Quiroga personally performed the services described in this documentation    as scribed in my presence :

## 2022-07-03 NOTE — TELEPHONE ENCOUNTER
Pt called in again stating no one returned her call and she still doesn't know where to get the guillermo splint  On call paged via TC

## 2022-07-05 ENCOUNTER — OFFICE VISIT (OUTPATIENT)
Dept: PHYSICAL THERAPY | Facility: CLINIC | Age: 42
End: 2022-07-05
Payer: COMMERCIAL

## 2022-07-05 DIAGNOSIS — M25.521 RIGHT ELBOW PAIN: ICD-10-CM

## 2022-07-05 DIAGNOSIS — Z47.89 AFTERCARE FOLLOWING SURGERY OF THE MUSCULOSKELETAL SYSTEM: Primary | ICD-10-CM

## 2022-07-05 PROCEDURE — 97112 NEUROMUSCULAR REEDUCATION: CPT | Performed by: PHYSICAL THERAPIST

## 2022-07-05 PROCEDURE — 97140 MANUAL THERAPY 1/> REGIONS: CPT | Performed by: PHYSICAL THERAPIST

## 2022-07-05 PROCEDURE — 97110 THERAPEUTIC EXERCISES: CPT | Performed by: PHYSICAL THERAPIST

## 2022-07-05 NOTE — PROGRESS NOTES
Daily Note     Today's date: 2022  Patient name: Xavier Howell  : 1980  MRN: [id number]  Referring provider: Clementina Sexton DO  Dx:   Encounter Diagnosis     ICD-10-CM    1  Aftercare following surgery of the musculoskeletal system  Z47 89    2  Right elbow pain  M25 521        Start Time: 1215  Stop Time: 1255  Total time in clinic (min): 40 minutes    Subjective: Pt reports she followed up with surgeon's office concerning brace and is awaiting a call back  Reports soreness in the elbow after doing a lot of stretching over the weekend  Objective: See treatment diary below      Assessment: Tolerated treatment well  Continued with current POC with good tolerance from the patient  Discomfort reported with exercises, decreased with rest  Improved mobility noted in the right elbow after contract relax PNF  Advised to continue with home exercises  Progress as tolerated  Patient would benefit from continued PT      Plan: Continue per plan of care        Precautions: S/P Right Radial Head Replacement (22)    Access Code: [facility]     Manuals 6/6 6/10 6/13 6/20 6/27 6/30 7/5    R Elbow PROM   [address]     R Elbow STM   [address]                           Neuro Re-Ed           Nerve Glides      Med N  10x supine      Scap Retract            Prone I/T/Y   20x ea 20x ea 20x ea      PNF D1/D2  10x ea supine 10x ea supine 10x ea supine 10x ea supine 10x D2 YTB 10x D2 YTB    TB OH Ext w/ Row      10x YTB 10x YTB    TB Low Row w/ Sup      10x YTB 10x YTB                          Education  S/S, POC, HEP          Ther Ex           Ext Stretch at Wall 10x 20x 20x 20x 2x10 2x10 2x10    Elbow Flex/Ext w/ Towel 10x  20x 20x 20x 2x10 2x10 2x10    Ext/Sup Stretch at Wall 10x  20x 20x 20x 2x10 2x10 2x10    Elbow AROM  20x pro sup 20x pro sup 20x pro sup 20x pro sup 20x pro sup 20x pro sup    Wrist  20x flex ext  20x flex ext  20x flex ext 20x flex ext 20x flex ext 20x flex ext    Wall Slides   10x  10x 2x10 2x10 2x10    Plinth Wrist Ext Stretch     10x 2x10 2x10    Plinth Rock Backs     10x 2x10  2x10               Ther Activity                                 Gait Training                                 Modalities unable to perform

## 2022-07-06 ENCOUNTER — TELEPHONE (OUTPATIENT)
Dept: OBGYN CLINIC | Facility: HOSPITAL | Age: 42
End: 2022-07-06

## 2022-07-06 NOTE — TELEPHONE ENCOUNTER
Bassem Page at [address] [place] [address] Entrance [phone] fax [phone]    Needing OP notes and office visit note to get authorization for elbow

## 2022-07-07 ENCOUNTER — OFFICE VISIT (OUTPATIENT)
Dept: PHYSICAL THERAPY | Facility: CLINIC | Age: 42
End: 2022-07-07
Payer: COMMERCIAL

## 2022-07-07 DIAGNOSIS — Z47.89 AFTERCARE FOLLOWING SURGERY OF THE MUSCULOSKELETAL SYSTEM: Primary | ICD-10-CM

## 2022-07-07 DIAGNOSIS — M25.521 RIGHT ELBOW PAIN: ICD-10-CM

## 2022-07-07 PROCEDURE — 97140 MANUAL THERAPY 1/> REGIONS: CPT | Performed by: PHYSICAL THERAPIST

## 2022-07-07 PROCEDURE — 97110 THERAPEUTIC EXERCISES: CPT | Performed by: PHYSICAL THERAPIST

## 2022-07-07 PROCEDURE — 97112 NEUROMUSCULAR REEDUCATION: CPT | Performed by: PHYSICAL THERAPIST

## 2022-07-07 NOTE — PROGRESS NOTES
Daily Note     Today's date: 2022  Patient name: Gurpreet Ibrahim  : 1980  MRN: [id number]  Referring provider: Lucy Argueta DO  Dx:   Encounter Diagnosis     ICD-10-CM    1  Aftercare following surgery of the musculoskeletal system  Z47 89    2  Right elbow pain  M25 521        Start Time: 1315  Stop Time: 1400  Total time in clinic (min): 45 minutes    Subjective: Pt with no new concerns noted related to the right elbow  Pt reports she received a call from DME provider concerning brace for pre-authorization  Objective: See treatment diary below      Assessment: Tolerated treatment well  Continued with current POC with good tolerance from the patient  Improved mobility noted with contract relax for elbow extension but continues to lack full PROM of the right elbow and forearm  Continue to progress as tolerated  Patient would benefit from continued PT      Plan: Continue per plan of care        Precautions: S/P Right Radial Head Replacement (22)    Access Code: [facility] [place]     Manuals 6/6 6/10 6/13 6/20 6/27 6/30 7/5 7/7   R Elbow PROM  [facility]   R Elbow STM  KCB KCB KCB KCB KCB KCB KCB                         Neuro Re-Ed           Nerve Glides      Med N  10x supine      Scap Retract            Prone I/T/Y   20x ea 20x ea 20x ea      PNF D1/D2  10x ea supine 10x ea supine 10x ea supine 10x ea supine 10x D2 YTB 10x D2 YTB 15x D2 YTB   TB OH Ext w/ Row      10x YTB 10x YTB 15x YTB   TB Low Row w/ Sup      10x YTB 10x YTB 15x YTB                         Education  S/S, POC, HEP          Ther Ex           Ext Stretch at Wall 10x 20x 20x 20x 2x10 2x10 2x10 2x10   Elbow Flex/Ext w/ Towel 10x  20x 20x 20x 2x10 2x10 2x10 2x10   Ext/Sup Stretch at Wall 10x  20x 20x 20x 2x10 2x10 2x10 2x10   Elbow AROM  20x pro sup 20x pro sup 20x pro sup 20x pro sup 20x pro sup 20x pro sup 20x pro sup   Wrist  20x flex ext  20x flex ext  20x flex ext 20x flex ext 20x flex ext 20x flex ext 20x flex ext Wall Slides   10x  10x 2x10 2x10 2x10 2x10   Plinth Wrist Ext Stretch     10x 2x10 2x10 2x10   Plinth Rock Backs     10x 2x10  2x10 2x10   TB Elbow Flex        10x RTB              Ther Activity                                 Gait Training                                 Modalities

## 2022-07-11 ENCOUNTER — OFFICE VISIT (OUTPATIENT)
Dept: PHYSICAL THERAPY | Facility: CLINIC | Age: 42
End: 2022-07-11
Payer: COMMERCIAL

## 2022-07-11 DIAGNOSIS — Z47.89 AFTERCARE FOLLOWING SURGERY OF THE MUSCULOSKELETAL SYSTEM: Primary | ICD-10-CM

## 2022-07-11 DIAGNOSIS — M25.521 RIGHT ELBOW PAIN: ICD-10-CM

## 2022-07-11 PROCEDURE — 97110 THERAPEUTIC EXERCISES: CPT | Performed by: PHYSICAL THERAPIST

## 2022-07-11 PROCEDURE — 97112 NEUROMUSCULAR REEDUCATION: CPT | Performed by: PHYSICAL THERAPIST

## 2022-07-11 PROCEDURE — 97140 MANUAL THERAPY 1/> REGIONS: CPT | Performed by: PHYSICAL THERAPIST

## 2022-07-11 NOTE — PROGRESS NOTES
Progress Note     Today's date: 2022  Patient name: Elsy Schuler  : 1980  MRN: [id number]  Referring provider: Doug Wheat DO  Dx:   Encounter Diagnosis     ICD-10-CM    1  Aftercare following surgery of the musculoskeletal system  Z47 89    2  Right elbow pain  M25 521        Start Time: 1400  Stop Time: 1445  Total time in clinic (min): 45 minutes    Subjective: The patient presents for re-evaluation today  The patient reports improvement in symptoms since beginning skilled physical therapy  The patient reports 2/10 pain at it's worst over the past 24 hours, and reports 75-80% improvement in overall condition since beginning formal PT care  Numbness and tingling in the right UE has resolved  Significant decrease in pain frequency and intensity since starting PT  Improvement noted in ADL performance but still has some limitations compared to the left UE  The patient's chief remaining concern is lack of full extension and flexion in the right elbow limiting ADLs and functional activities  Objective: Active Range of Motion     Right Elbow IE 22 RA 22   Flexion  85 degrees 115 degrees   Extension 135 degrees 160 degrees   Supination  20 degrees 85 degrees   Pronation  30 degrees 75 degrees       Right Wrist IE 22 RA 22   Flexion  60 degrees 85 degrees   Extension  20 degrees 70 degrees   Radial Deviation  15 degrees 20 degrees   Ulnar Deviation  20 degrees 45 degrees       Strength/Myotome Testing       Right Elbow IE 22 RA 22   Flexion  NT 4-/5   Extension NT 4/5   Supination  NT 4-/5   Pronation  NT 4/5   Right Wrist      Flexion  NT 4+/5   Extension NT 4+/5   Radial Deviation NT 4/5   Ulnar Deviation  NT 4/5        Assessment: Elsy Schuler is a pleasant 38 y o  female who has been receiving PT intervention for right elbow and forearm pain s/p radial head replacement performed 22   The patient has demonstrated decreased pain, increased strength, increased ROM and increased activity tolerance since beginning treatment  The patient continues to be compliant with HEP  Continues to lack full mobility in the right elbow and forearm, however, continues to gradually improve visit to visit  Primary remaining impairments include:    1)  Decreased active and passive ROM of the right elbow and UE    2)  Decreased mm strength of the right elbow and UE    Goals  Short Term Goals 2-4 wks   1  Pt will be independent with initial HEP -MET  2  Pt will improve AROM of the right elbow/UE to WNL and pain free- Partially MET  3  Pt will decrease pain to < 2/10 at worst -MET    Long Term Goals 6-8 wks  1  Pt will improve deficient mm strength to 5/5 t/o -Progressing   2  Pt will report no limitations with ADLs, lifting, carrying - Progressing  3   Pt will improve FOTO score to >66 by d/c - Progressing     Plan:   Patient would benefit from: PT eval and skilled physical therapy  Referral necessary: No  Planned therapy interventions: joint mobilization, manual therapy, massage, neuromuscular re-education, patient education, postural training, therapeutic activities, therapeutic exercise, home exercise program, graded exercise and graded activity  Frequency: 2x week  Duration in weeks: 6     Precautions: S/P Right Radial Head Replacement (5/23/22)    Access Code: [facility]     Manuals 7/11 6/13 6/20 6/27 6/30 7/5 7/7   R Elbow PROM [address]   R Elbow  [address]                         Neuro Re-Ed           Nerve Glides      Med N  10x supine      Prone I/T/Y   20x ea 20x ea 20x ea      PNF D1/D2 15x D2 YTB  10x ea supine 10x ea supine 10x ea supine 10x D2 YTB 10x D2 YTB 15x D2 YTB   TB OH Ext w/ Row 15x YTB     10x YTB 10x YTB 15x YTB   TB Low Row w/ Sup 15x YTB     10x YTB 10x YTB 15x YTB                         Education  Foto          Ther Ex           Ext Stretch at Wall 2x10  20x 20x 2x10 2x10 2x10 2x10   Elbow Flex/Ext w/ Towel 2x10  20x 20x 2x10 2x10 2x10 2x10   Ext/Sup Stretch at Wall 2x10  20x 20x 2x10 2x10 2x10 2x10   Elbow AROM 20x pro sup  20x pro sup 20x pro sup 20x pro sup 20x pro sup 20x pro sup 20x pro sup   Wrist 20x flex ext  20x flex ext  20x flex ext 20x flex ext 20x flex ext 20x flex ext 20x flex ext   Wall Slides 2x10  10x  10x 2x10 2x10 2x10 2x10   Plinth Wrist Ext Stretch 2x10    10x 2x10 2x10 2x10   Plinth Rock Backs 2x10    10x 2x10  2x10 2x10   TB Elbow Flex 2x10 RTB       10x RTB   TB Supination  2x10 RTB                     Ther Activity                                 Gait Training                                 Modalities

## 2022-07-13 ENCOUNTER — TELEPHONE (OUTPATIENT)
Dept: OBGYN CLINIC | Facility: HOSPITAL | Age: 42
End: 2022-07-13

## 2022-07-13 NOTE — TELEPHONE ENCOUNTER
Patient sees Dr Laurie Moore      Patient is calling to see if we received a form from care now for Dr Laurie Moore to fill out for her for pre employment  I advised that we did not receive it   She is going to call care now

## 2022-07-14 ENCOUNTER — OFFICE VISIT (OUTPATIENT)
Dept: PHYSICAL THERAPY | Facility: CLINIC | Age: 42
End: 2022-07-14
Payer: COMMERCIAL

## 2022-07-14 DIAGNOSIS — M25.521 RIGHT ELBOW PAIN: ICD-10-CM

## 2022-07-14 DIAGNOSIS — Z47.89 AFTERCARE FOLLOWING SURGERY OF THE MUSCULOSKELETAL SYSTEM: Primary | ICD-10-CM

## 2022-07-14 PROCEDURE — 97110 THERAPEUTIC EXERCISES: CPT | Performed by: PHYSICAL THERAPIST

## 2022-07-14 PROCEDURE — 97112 NEUROMUSCULAR REEDUCATION: CPT | Performed by: PHYSICAL THERAPIST

## 2022-07-14 PROCEDURE — 97140 MANUAL THERAPY 1/> REGIONS: CPT | Performed by: PHYSICAL THERAPIST

## 2022-07-14 NOTE — PROGRESS NOTES
Daily Note     Today's date: 2022  Patient name: Celestine Javier  : 1980  MRN: [id number]  Referring provider: Emre Holland DO  Dx:   Encounter Diagnosis     ICD-10-CM    1  Aftercare following surgery of the musculoskeletal system  Z47 89    2  Right elbow pain  M25 521        Start Time: 1415  Stop Time: 1500  Total time in clinic (min): 45 minutes    Subjective: Pt with no new concerns noted at this time  Objective: See treatment diary below      Assessment: Tolerated treatment well  Continued with current POC with good tolerance from the patient  No significant pain reported with exercises  Improved mobility noted in the right elbow, however, continues to lack full PROM  Continue to progress as tolerated  Patient would benefit from continued PT      Plan: Continue per plan of care        Precautions: S/P Right Radial Head Replacement (22)    Access Code: [facility]     Manuals    R Elbow PROM KCB KCB  KCB KCB KCB KCB KCB   R Elbow STM KCB KCB  KCB KCB KCB KCB KCB                         Neuro Re-Ed           Nerve Glides      Med N  10x supine      Prone I/T/Y    20x ea 20x ea      PNF D1/D2 15x D2 YTB 15x D2 YTB  10x ea supine 10x ea supine 10x D2 YTB 10x D2 YTB 15x D2 YTB   TB OH Ext w/ Row 15x YTB 15x YTB    10x YTB 10x YTB 15x YTB   TB Low Row w/ Sup 15x YTB 15x YTB    10x YTB 10x YTB 15x YTB                         Education  Foto          Ther Ex           Ext Stretch at Wall 2x10 2x10  20x 2x10 2x10 2x10 2x10   Elbow Flex/Ext w/ Towel 2x10 2x10  20x 2x10 2x10 2x10 2x10   Ext/Sup Stretch at Wall 2x10 2x10  20x 2x10 2x10 2x10 2x10   Elbow AROM 20x pro sup 20x pro sup  20x pro sup 20x pro sup 20x pro sup 20x pro sup 20x pro sup   Wrist 20x flex ext 20x flex ext  20x flex ext 20x flex ext 20x flex ext 20x flex ext 20x flex ext   Wall Slides 2x10 2x10  10x 2x10 2x10 2x10 2x10   Plinth Wrist Ext Stretch 2x10 2x10   10x 2x10 2x10 2x10   Plinth [employer] advised, pt now in [place]   2x10 2x10   10x 2x10  2x10 2x10   TB Elbow Flex 2x10 RTB 2x10 RTB      10x RTB   TB Supination  2x10 RTB 2x10 RTB                    Ther Activity                                 Gait Training                                 Modalities

## 2022-07-15 NOTE — TELEPHONE ENCOUNTER
Patient called again to check the status of the forms  Said they were dropped off at the office on Wed 7/13      Nothing in [address] # [phone]

## 2022-07-20 NOTE — TELEPHONE ENCOUNTER
Patient called into the office, her work did receive the form but the form only has a signature on it  They need each section of the form to be filled out and resent back  It needs to specifically state that she has 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 as well  She states that this is holding back her employment and it does need to be completed for today  She is asking for a call back once updated          #:[phone]

## 2022-07-22 ENCOUNTER — APPOINTMENT (OUTPATIENT)
Dept: PHYSICAL THERAPY | Facility: CLINIC | Age: 42
End: 2022-07-22
Payer: COMMERCIAL

## 2022-07-25 ENCOUNTER — OFFICE VISIT (OUTPATIENT)
Dept: PHYSICAL THERAPY | Facility: CLINIC | Age: 42
End: 2022-07-25
Payer: COMMERCIAL

## 2022-07-25 DIAGNOSIS — M25.521 RIGHT ELBOW PAIN: ICD-10-CM

## 2022-07-25 DIAGNOSIS — Z47.89 AFTERCARE FOLLOWING SURGERY OF THE MUSCULOSKELETAL SYSTEM: Primary | ICD-10-CM

## 2022-07-25 PROCEDURE — 97110 THERAPEUTIC EXERCISES: CPT | Performed by: PHYSICAL THERAPIST

## 2022-07-25 PROCEDURE — 97112 NEUROMUSCULAR REEDUCATION: CPT | Performed by: PHYSICAL THERAPIST

## 2022-07-25 PROCEDURE — 97140 MANUAL THERAPY 1/> REGIONS: CPT | Performed by: PHYSICAL THERAPIST

## 2022-07-25 NOTE — PROGRESS NOTES
Daily Note     Today's date: 2022  Patient name: Valeen Lombard  : 1980  MRN: [id number]  Referring provider: Kash Oakes DO  Dx:   Encounter Diagnosis     ICD-10-CM    1  Aftercare following surgery of the musculoskeletal system  Z47 89    2  Right elbow pain  M25 521        Start Time: 1400  Stop Time: 1440  Total time in clinic (min): 40 minutes    Subjective: Pt with no new concerns noted at this time  Continues to perform home exercises as tolerated  Objective: See treatment diary below      Assessment: Tolerated treatment well  Continued with current POC with good tolerance from the patient  Progressed TB exercises without increase in symptoms  Continues to gradually improve with ROM in the right elbow  Progress as tolerated  Post tx, pt fitted for extension and flexion bracing by [employer] representative  Patient would benefit from continued PT      Plan: Continue per plan of care        Precautions: S/P Right Radial Head Replacement (22)    Access Code: [facility]     Manuals    R Elbow PROM KCB KCB KCB  KCB KCB KCB KCB   R Elbow STM KCB KCB KCB  KCB KCB KCB KCB                         Neuro Re-Ed           Nerve Glides      Med N  10x supine      Prone I/T/Y     20x ea      PNF D1/D2 15x D2 YTB 15x D2 YTB 20x D2 GTB  10x ea supine 10x D2 YTB 10x D2 YTB 15x D2 YTB   TB OH Ext w/ Row 15x YTB 15x YTB 20x GTB   10x YTB 10x YTB 15x YTB   TB Low Row w/ Sup 15x YTB 15x YTB 20x GTB   10x YTB 10x YTB 15x YTB                         Education  Foto          Ther Ex           Ext Stretch at Wall 2x10 2x10 2x10  2x10 2x10 2x10 2x10   Elbow Flex/Ext w/ Towel 2x10 2x10 2x10  2x10 2x10 2x10 2x10   Ext/Sup Stretch at Wall 2x10 2x10 2x10  2x10 2x10 2x10 2x10   Elbow AROM 20x pro sup 20x pro sup 20x pro sup  20x pro sup 20x pro sup 20x pro sup 20x pro sup   Wrist 20x flex ext 20x flex ext 20x flex ext  20x flex ext 20x flex ext 20x flex ext 20x flex ext   Wall Slides 2x10 2x10 2x10  2x10 2x10 2x10 2x10   Plinth Wrist Ext Stretch 2x10 2x10 2x10  10x 2x10 2x10 2x10   Plinth Rock Backs 2x10 2x10 2x10  10x 2x10  2x10 2x10   TB Elbow Flex 2x10 RTB 2x10 RTB 2x10 RTB     10x RTB   TB Supination  2x10 RTB 2x10 RTB 2x10 RTB                   Ther Activity                                 Gait Training                                 Modalities

## 2022-07-28 ENCOUNTER — OFFICE VISIT (OUTPATIENT)
Dept: PHYSICAL THERAPY | Facility: CLINIC | Age: 42
End: 2022-07-28
Payer: COMMERCIAL

## 2022-07-28 DIAGNOSIS — M25.521 RIGHT ELBOW PAIN: ICD-10-CM

## 2022-07-28 DIAGNOSIS — Z47.89 AFTERCARE FOLLOWING SURGERY OF THE MUSCULOSKELETAL SYSTEM: Primary | ICD-10-CM

## 2022-07-28 PROCEDURE — 97110 THERAPEUTIC EXERCISES: CPT

## 2022-07-28 PROCEDURE — 97140 MANUAL THERAPY 1/> REGIONS: CPT

## 2022-07-28 NOTE — PROGRESS NOTES
Daily Note     Today's date: 2022  Patient name: Guy Rod  : 1980  MRN: [id number]  Referring provider: Tereso Azar DO  Dx:   Encounter Diagnosis     ICD-10-CM    1  Aftercare following surgery of the musculoskeletal system  Z47 89    2  Right elbow pain  M25 521                   Subjective: Pt states overall she feels pretty good just has stiffness  Objective: See treatment diary below      Assessment: Tolerated treatment well  Patient would benefit from continued PT      Plan: Continue per plan of care        Precautions: S/P Right Radial Head Replacement (22)    Access Code: [facility]     Manuals    R Elbow PROM KCB KCB KCB DOHERTY  KCB KCB KCB   R Elbow STM KCB KCB KCB HA  KCB KCB KCB                         Neuro Re-Ed           Nerve Glides            Prone I/T/Y           PNF D1/D2 15x D2 YTB 15x D2 YTB 20x D2 GTB 20x D2 GTB  10x D2 YTB 10x D2 YTB 15x D2 YTB   TB OH Ext w/ Row 15x YTB 15x YTB 20x GTB 20x GTB  10x YTB 10x YTB 15x YTB   TB Low Row w/ Sup 15x YTB 15x YTB 20x GTB 20x GTB  10x YTB 10x YTB 15x YTB                         Education  Foto          Ther Ex           Ext Stretch at Wall 2x10 2x10 2x10 2x10  2x10 2x10 2x10   Elbow Flex/Ext w/ Towel 2x10 2x10 2x10 2x10  2x10 2x10 2x10   Ext/Sup Stretch at Wall 2x10 2x10 2x10 2x10  2x10 2x10 2x10   Elbow AROM 20x pro sup 20x pro sup 20x pro sup 20x pro sup  20x pro sup 20x pro sup 20x pro sup   Wrist 20x flex ext 20x flex ext 20x flex ext 20x flex ext  20x flex ext 20x flex ext 20x flex ext   Wall Slides 2x10 2x10 2x10 2x10  2x10 2x10 2x10   Plinth Wrist Ext Stretch 2x10 2x10 2x10 2x10  2x10 2x10 2x10   Plinth Rock Backs 2x10 2x10 2x10 2x10  2x10  2x10 2x10   TB Elbow Flex 2x10 RTB 2x10 RTB 2x10 RTB 2x10 RTB    10x RTB   TB Supination  2x10 RTB 2x10 RTB 2x10 RTB 2x10 RTB                  Ther Activity                                 Gait Training                                 Modalities

## 2022-08-01 ENCOUNTER — OFFICE VISIT (OUTPATIENT)
Dept: PHYSICAL THERAPY | Facility: CLINIC | Age: 42
End: 2022-08-01
Payer: COMMERCIAL

## 2022-08-01 DIAGNOSIS — Z47.89 AFTERCARE FOLLOWING SURGERY OF THE MUSCULOSKELETAL SYSTEM: Primary | ICD-10-CM

## 2022-08-01 DIAGNOSIS — M25.521 RIGHT ELBOW PAIN: ICD-10-CM

## 2022-08-01 PROCEDURE — 97110 THERAPEUTIC EXERCISES: CPT | Performed by: PHYSICAL THERAPIST

## 2022-08-01 PROCEDURE — 97140 MANUAL THERAPY 1/> REGIONS: CPT | Performed by: PHYSICAL THERAPIST

## 2022-08-01 PROCEDURE — 97112 NEUROMUSCULAR REEDUCATION: CPT | Performed by: PHYSICAL THERAPIST

## 2022-08-01 NOTE — PROGRESS NOTES
Daily Note     Today's date: 2022  Patient name: Emmy Sr  : 1980  MRN: [id number]  Referring provider: Gary Robbins DO  Dx:   Encounter Diagnosis     ICD-10-CM    1  Aftercare following surgery of the musculoskeletal system  Z47 89    2  Right elbow pain  M25 521        Start Time: 09  Stop Time: 1025  Total time in clinic (min): 40 minutes    Subjective: Pt reports she continues to use brace as instructed by DME provider and is scheduled to receive third brace today to improve pronation and supination  Objective: See treatment diary below      Assessment: Tolerated treatment well  Continued with current POC with good tolerance from the patient  No significant pain reported with exercises  Improved mobility noted in the right elbow approaching full PROM  Continue to progress as tolerated  Patient would benefit from continued PT      Plan: Continue per plan of care        Precautions: S/P Right Radial Head Replacement (22)    Access Code: [facility]     Manuals    R Elbow PROM KCB KCB KCB DOHERTY KCB  KCB KCB   R Elbow STM KCB KCB KCB HA KCB  KCB KCB                         Neuro Re-Ed           Nerve Glides            Prone I/T/Y           PNF D1/D2 15x D2 YTB 15x D2 YTB 20x D2 GTB 20x D2 GTB 20x D2 GTB  10x D2 YTB 15x D2 YTB   TB OH Ext w/ Row 15x YTB 15x YTB 20x GTB 20x GTB 20x GTB  10x YTB 15x YTB   TB Low Row w/ Sup 15x YTB 15x YTB 20x GTB 20x GTB 20x GTB  10x YTB 15x YTB                         Education  Foto          Ther Ex           Ext Stretch at Wall 2x10 2x10 2x10 2x10 2x10  2x10 2x10   Elbow Flex/Ext w/ Towel 2x10 2x10 2x10 2x10 2x10  2x10 2x10   Ext/Sup Stretch at Wall 2x10 2x10 2x10 2x10 2x10  2x10 2x10   Elbow AROM 20x pro sup 20x pro sup 20x pro sup 20x pro sup 20x pro sup  20x pro sup 20x pro sup   Wrist 20x flex ext 20x flex ext 20x flex ext 20x flex ext 20x flex ext  20x flex ext 20x flex ext   Wall Slides 2x10 2x10 2x10 2x10 2x10  2x10 2x10   Plinth Wrist Ext Stretch 2x10 2x10 2x10 2x10 2x10  2x10 2x10   Plinth Rock Backs 2x10 2x10 2x10 2x10 2x10  2x10 2x10   TB Elbow Flex 2x10 RTB 2x10 RTB 2x10 RTB 2x10 RTB 2x10 RTB   10x RTB   TB Supination  2x10 RTB 2x10 RTB 2x10 RTB 2x10 RTB 2x10 RTB                 Ther Activity                                 Gait Training                                 Modalities

## 2022-08-04 ENCOUNTER — APPOINTMENT (OUTPATIENT)
Dept: PHYSICAL THERAPY | Facility: CLINIC | Age: 42
End: 2022-08-04
Payer: COMMERCIAL

## 2022-08-05 ENCOUNTER — OFFICE VISIT (OUTPATIENT)
Dept: PHYSICAL THERAPY | Facility: CLINIC | Age: 42
End: 2022-08-05
Payer: COMMERCIAL

## 2022-08-05 DIAGNOSIS — Z47.89 AFTERCARE FOLLOWING SURGERY OF THE MUSCULOSKELETAL SYSTEM: Primary | ICD-10-CM

## 2022-08-05 DIAGNOSIS — M25.521 RIGHT ELBOW PAIN: ICD-10-CM

## 2022-08-05 PROCEDURE — 97110 THERAPEUTIC EXERCISES: CPT | Performed by: PHYSICAL THERAPIST

## 2022-08-05 PROCEDURE — 97140 MANUAL THERAPY 1/> REGIONS: CPT | Performed by: PHYSICAL THERAPIST

## 2022-08-05 PROCEDURE — 97112 NEUROMUSCULAR REEDUCATION: CPT | Performed by: PHYSICAL THERAPIST

## 2022-08-05 NOTE — PROGRESS NOTES
Daily Note     Today's date: 2022  Patient name: Sharyn Calderon  : 1980  MRN: [id number]  Referring provider: Catia Encinas DO  Dx:   Encounter Diagnosis     ICD-10-CM    1  Aftercare following surgery of the musculoskeletal system  Z47 89    2  Right elbow pain  M25 521        Start Time: 1205  Stop Time: 1245  Total time in clinic (min): 40 minutes    Subjective: Pt reports she continues to notice gradual improvements with ROM in the right elbow  No new concerns noted at this time  Objective: See treatment diary below      Assessment: Tolerated treatment well  Continued with current POC with good tolerance from the patient  No pain reported with exercises  Continues to gradually improve with ROM of the right elbow  Continue to progress as tolerated  Patient would benefit from continued PT      Plan: Continue per plan of care        Precautions: S/P Right Radial Head Replacement (22)    Access Code: [facility]     Manuals      R Elbow PROM KCB KCB KCB HA KCB KCB     R Elbow STM KCB KCB KCB HA KCB KCB                           Neuro Re-Ed           Nerve Glides            Prone I/T/Y           PNF D1/D2 15x D2 YTB 15x D2 YTB 20x D2 GTB 20x D2 GTB 20x D2 GTB 20x D2 GTB     TB OH Ext w/ Row 15x YTB 15x YTB 20x GTB 20x GTB 20x GTB 20x GTB     TB Low Row w/ Sup 15x YTB 15x YTB 20x GTB 20x GTB 20x GTB 20x GTB                           Education  Foto          Ther Ex           Ext Stretch at [place] 2x10 2x10 2x10 2x10 2x10 2x10     Elbow Flex/Ext w/ Towel 2x10 2x10 2x10 2x10 2x10 2x10     Ext/Sup Stretch at Wall 2x10 2x10 2x10 2x10 2x10 2x10     Elbow AROM 20x pro sup 20x pro sup 20x pro sup 20x pro sup 20x pro sup 20x pro sup     Wrist 20x flex ext 20x flex ext 20x flex ext 20x flex ext 20x flex ext 20x flex ext     Wall Slides 2x10 2x10 2x10 2x10 2x10 2x10     Plinth Wrist Ext Stretch 2x10 2x10 2x10 2x10 2x10 2x10     Plinth Rock Backs 2x10 2x10 2x10 2x10 2x10 2x10     TB Elbow Flex 2x10 RTB 2x10 RTB 2x10 RTB 2x10 RTB 2x10 RTB 2x10 GTB     TB Supination  2x10 RTB 2x10 RTB 2x10 RTB 2x10 RTB 2x10 RTB 2x10 RTB                Ther Activity                                 Gait Training                                 Modalities

## 2022-08-08 ENCOUNTER — APPOINTMENT (OUTPATIENT)
Dept: PHYSICAL THERAPY | Facility: CLINIC | Age: 42
End: 2022-08-08
Payer: COMMERCIAL

## 2022-08-09 ENCOUNTER — TELEPHONE (OUTPATIENT)
Dept: OBGYN CLINIC | Facility: HOSPITAL | Age: 42
End: 2022-08-09

## 2022-08-09 NOTE — TELEPHONE ENCOUNTER
Patient sees Dr Amy Sutherland      Patients dental office [address] [facility]  is calling to see if patient has to premedicate prior to dental procedures  Also, when can she be cleared for dental work and cleanings?       CB: [phone]

## 2022-08-10 NOTE — TELEPHONE ENCOUNTER
Transferred call to [facility] CARLOS [facility]RIQUE SUG[facility]

## 2022-08-10 NOTE — TELEPHONE ENCOUNTER
Spoke to patient  Advised of above message from Sandra saenz  Understanding was verbalized  No further questions

## 2022-08-10 NOTE — TELEPHONE ENCOUNTER
Dr Jonelle Robertson would like pt to wait 3 months from surgery to get dental work done   He said she does not need antibiotics however

## 2022-08-11 ENCOUNTER — EVALUATION (OUTPATIENT)
Dept: PHYSICAL THERAPY | Facility: CLINIC | Age: 42
End: 2022-08-11
Payer: COMMERCIAL

## 2022-08-11 DIAGNOSIS — Z47.89 AFTERCARE FOLLOWING SURGERY OF THE MUSCULOSKELETAL SYSTEM: Primary | ICD-10-CM

## 2022-08-11 DIAGNOSIS — M25.521 RIGHT ELBOW PAIN: ICD-10-CM

## 2022-08-11 PROCEDURE — 97110 THERAPEUTIC EXERCISES: CPT | Performed by: PHYSICAL THERAPIST

## 2022-08-11 PROCEDURE — 97112 NEUROMUSCULAR REEDUCATION: CPT | Performed by: PHYSICAL THERAPIST

## 2022-08-11 PROCEDURE — 97140 MANUAL THERAPY 1/> REGIONS: CPT | Performed by: PHYSICAL THERAPIST

## 2022-08-11 NOTE — PROGRESS NOTES
Progress Note     Today's date: 2022  Patient name: Paulina Ibrahim  : 1980  MRN: [id number]  Referring provider: Hiren Coelho DO  Dx:   Encounter Diagnosis     ICD-10-CM    1  Aftercare following surgery of the musculoskeletal system  Z47 89    2  Right elbow pain  M25 521        Start Time: 1505  Stop Time: 1545  Total time in clinic (min): 40 minutes    Subjective: The patient presents for re-evaluation today  The patient reports continued improvement with symptoms in the right elbow  Continues to use all 3 braces to restore extension, flexion, and supination/pronation and has been able to increase total time spent throughout the day  Reports continued improvement in mobility in the right elbow and UE, leading to improved functional performance  Continues to have difficulty with combined motions, such as reaching behind the head and behind the back  Pain levels more controlled at this point and increase typically with only increase in activity levels  Pt is scheduled to f/u with surgeon's office on   Objective:      Active Range of Motion     Right Elbow IE 22 RA 22 RA 22   Flexion  85 degrees 115 degrees 120 degrees   Extension 135 degrees 160 degrees 173 degrees  (-7 degrees)   Supination  20 degrees 85 degrees 85 degrees   Pronation  30 degrees 75 degrees 90 degrees       Right Wrist IE 22 RA 22 RA 22   Flexion  60 degrees 85 degrees 85 degrees   Extension  20 degrees 70 degrees 80 degrees   Radial Deviation  15 degrees 20 degrees 20 degrees    Ulnar Deviation  20 degrees 45 degrees 45 degrees       Strength/Myotome Testing       Right Elbow IE 22 RA 22 RA 22   Flexion  NT 4-/5 4/5   Extension NT 4/5 4+/5   Supination  NT 4-/5 4/5   Pronation  NT 4/5 5/5   Right Wrist       Flexion  NT 4+/5 5/5   Extension NT 4+/5 5/5   Radial Deviation NT 4/5 4+/5   Ulnar Deviation  NT 4/5 5/5       Assessment: Paulina Ibrahim is a pleasant 38 y o  female who has been receiving PT intervention for right elbow pain and tightness s/p radial head replacement  The patient has demonstrated decreased pain, increased strength, increased ROM, increased joint mobility and increased activity tolerance since beginning treatment  Primary remaining impairments include:    1)  Decreased AROM of the right elbow and UE    2)  Decreased mm strength of the right elbow and UE    Goals  Short Term Goals 2-4 wks   1  Pt will be independent with initial HEP -MET  2  Pt will improve AROM of the right elbow/UE to WNL and pain free- Partially MET  3  Pt will decrease pain to < 2/10 at worst -MET    Long Term Goals 6-8 wks  1  Pt will improve deficient mm strength to 5/5 t/o -Progressing   2  Pt will report no limitations with ADLs, lifting, carrying - Progressing  3   Pt will improve FOTO score to >66 by d/c - Progressing    Plan:  Patient would benefit from: PT eval and skilled physical therapy  Referral necessary: No  Planned therapy interventions: joint mobilization, manual therapy, massage, neuromuscular re-education, patient education, postural training, therapeutic activities, therapeutic exercise, home exercise program, graded exercise and graded activity  Frequency: 2x week  Duration in weeks: 6     Precautions: S/P Right Radial Head Replacement (5/23/22)    Access Code: [facility]     Manuals 7/11 7/14 7/25 7/28 8/1 8/5 8/11    R Elbow PROM KCB KCB KCB DOHERTY KCB KCB KCB    R Elbow STM KCB KCB KCB HA KCB KCB KCB                          Neuro Re-Ed           Nerve Glides            Prone I/T/Y           PNF D1/D2 15x D2 YTB 15x D2 YTB 20x D2 GTB 20x D2 GTB 20x D2 GTB 20x D2 GTB 20x D2 GTB    TB OH Ext w/ Row 15x YTB 15x YTB 20x GTB 20x GTB 20x GTB 20x GTB 20x GTB    TB Low Row w/ Sup 15x YTB 15x YTB 20x GTB 20x GTB 20x GTB 20x GTB 20x GTB                          Education  Foto          Ther Ex           Ext Stretch at [address] 2x10 2x10 2x10 2x10 2x10 2x10 2x10    Elbow Flex/Ext w/ Towel 2x10 2x10 2x10 2x10 2x10 2x10 2x10    Ext/Sup Stretch at Wall 2x10 2x10 2x10 2x10 2x10 2x10 2x10    Elbow AROM 20x pro sup 20x pro sup 20x pro sup 20x pro sup 20x pro sup 20x pro sup 20x pro sup    Wrist 20x flex ext 20x flex ext 20x flex ext 20x flex ext 20x flex ext 20x flex ext 20x flex ext    Wall Slides 2x10 2x10 2x10 2x10 2x10 2x10 2x10    Plinth Wrist Ext Stretch 2x10 2x10 2x10 2x10 2x10 2x10 2x10    Plinth Rock Backs 2x10 2x10 2x10 2x10 2x10 2x10 2x10    TB Elbow Flex 2x10 RTB 2x10 RTB 2x10 RTB 2x10 RTB 2x10 RTB 2x10 GTB 2x10 GTB    TB Supination  2x10 RTB 2x10 RTB 2x10 RTB 2x10 RTB 2x10 RTB 2x10 RTB 2x10 RTB               Ther Activity                                 Gait Training                                 Modalities

## 2022-08-16 ENCOUNTER — OFFICE VISIT (OUTPATIENT)
Dept: PHYSICAL THERAPY | Facility: CLINIC | Age: 42
End: 2022-08-16
Payer: COMMERCIAL

## 2022-08-16 DIAGNOSIS — Z47.89 AFTERCARE FOLLOWING SURGERY OF THE MUSCULOSKELETAL SYSTEM: Primary | ICD-10-CM

## 2022-08-16 DIAGNOSIS — M25.521 RIGHT ELBOW PAIN: ICD-10-CM

## 2022-08-16 PROCEDURE — 97110 THERAPEUTIC EXERCISES: CPT | Performed by: PHYSICAL THERAPIST

## 2022-08-16 PROCEDURE — 97140 MANUAL THERAPY 1/> REGIONS: CPT | Performed by: PHYSICAL THERAPIST

## 2022-08-16 PROCEDURE — 97112 NEUROMUSCULAR REEDUCATION: CPT | Performed by: PHYSICAL THERAPIST

## 2022-08-16 NOTE — PROGRESS NOTES
Daily Note     Today's date: 2022  Patient name: Dhaval Vasquez  : 1980  MRN: [id number]  Referring provider: Wesley Mullen DO  Dx:   Encounter Diagnosis     ICD-10-CM    1  Aftercare following surgery of the musculoskeletal system  Z47 89    2  Right elbow pain  M25 521        Start Time: 1215  Stop Time: 1255  Total time in clinic (min): 40 minutes    Subjective: Pt reports she progressed braces but hasn't noticed a significant difference in mobility  Pt feels that she has maxed out her flexion brace and no longer feels a stretch  Objective: See treatment diary below      Assessment: Tolerated treatment well  Continued with current POC with good tolerance from the patient  Added additional strengthening exercises without increase in symptoms  Pt continues to gradually improve with ROM in the right elbow  Continue to progress as tolerated  Patient would benefit from continued PT      Plan: Continue per plan of care        Precautions: S/P Right Radial Head Replacement (22)    Access Code: [email or web address]     Manuals    R Elbow PROM KCB KCB KCB HA KCB KCB KCB KCB   R Elbow STM KCB KCB KCB HA KCB KCB KCB KCB                         Neuro Re-Ed           Nerve Glides            Prone I/T/Y           PNF D1/D2 15x D2 YTB 15x D2 YTB 20x D2 GTB 20x D2 GTB 20x D2 GTB 20x D2 GTB 20x D2 GTB 20x D2 BTB   TB OH Ext w/ Row 15x YTB 15x YTB 20x GTB 20x GTB 20x GTB 20x GTB 20x GTB 20x BTB   TB Low Row w/ Sup 15x YTB 15x YTB 20x GTB 20x GTB 20x GTB 20x GTB 20x GTB 20x BTB                         Education  Foto          Ther Ex           Ext Stretch at [place] 2x10 2x10 2x10 2x10 2x10 2x10 2x10 2x10   Elbow Flex/Ext w/ Towel 2x10 2x10 2x10 2x10 2x10 2x10 2x10 2x10   Ext/Sup Stretch at Wall 2x10 2x10 2x10 2x10 2x10 2x10 2x10 2x10   Elbow AROM 20x pro sup 20x pro sup 20x pro sup 20x pro sup 20x pro sup 20x pro sup 20x pro sup 20x pro sup   Wrist 20x flex ext 20x flex ext 20x flex ext 20x flex ext 20x flex ext 20x flex ext 20x flex ext 20x flex ext   Wall Slides 2x10 2x10 2x10 2x10 2x10 2x10 2x10 2x10   Plinth Wrist Ext Stretch 2x10 2x10 2x10 2x10 2x10 2x10 2x10 2x10   Plinth Rock Backs 2x10 2x10 2x10 2x10 2x10 2x10 2x10 2x10   TB Elbow Flex 2x10 RTB 2x10 RTB 2x10 RTB 2x10 RTB 2x10 RTB 2x10 GTB 2x10 GTB    TB Supination  2x10 RTB 2x10 RTB 2x10 RTB 2x10 RTB 2x10 RTB 2x10 RTB 2x10 RTB    DB Bicep Curl        2x10 5#              Ther Activity           KB carry        1 lap ea  10# & 15#              Gait Training                                 Modalities

## 2022-08-18 ENCOUNTER — APPOINTMENT (OUTPATIENT)
Dept: PHYSICAL THERAPY | Facility: CLINIC | Age: 42
End: 2022-08-18
Payer: COMMERCIAL

## 2022-08-22 ENCOUNTER — OFFICE VISIT (OUTPATIENT)
Dept: PHYSICAL THERAPY | Facility: CLINIC | Age: 42
End: 2022-08-22
Payer: COMMERCIAL

## 2022-08-22 DIAGNOSIS — M25.521 RIGHT ELBOW PAIN: ICD-10-CM

## 2022-08-22 DIAGNOSIS — Z47.89 AFTERCARE FOLLOWING SURGERY OF THE MUSCULOSKELETAL SYSTEM: Primary | ICD-10-CM

## 2022-08-22 PROCEDURE — 97140 MANUAL THERAPY 1/> REGIONS: CPT | Performed by: PHYSICAL THERAPIST

## 2022-08-22 PROCEDURE — 97110 THERAPEUTIC EXERCISES: CPT | Performed by: PHYSICAL THERAPIST

## 2022-08-22 NOTE — PROGRESS NOTES
Daily Note     Today's date: 2022  Patient name: Kathy Hollingsworth  : 1980  MRN: [id number]  Referring provider: Zak Salcedo DO  Dx:   Encounter Diagnosis     ICD-10-CM    1  Aftercare following surgery of the musculoskeletal system  Z47 89    2  Right elbow pain  M25 521        Start Time: 1700  Stop Time: 1740  Total time in clinic (min): 40 minutes    Subjective: Pt with no new concerns noted at this time  Objective: See treatment diary below      Assessment: Tolerated treatment well  Continued with current POC with good tolerance from the patient  No pain reported with exercises  Continues to lack full PROM of the right elbow with only minimal limitation remaining  Continue to progress as tolerated  Patient would benefit from continued PT      Plan: Continue per plan of care        Precautions: S/P Right Radial Head Replacement (22)    Access Code: [facility]     Manuals    R Elbow PROM KCB  KCB HA KCB KCB KCB KCB   R Elbow STM KCB  KCB HA KCB KCB KCB KCB                         Neuro Re-Ed           Nerve Glides            Prone I/T/Y           PNF D1/D2 20x D2 BTB  20x D2 GTB 20x D2 GTB 20x D2 GTB 20x D2 GTB 20x D2 GTB 20x D2 BTB   TB OH Ext w/ Row 20x BTB  20x GTB 20x GTB 20x GTB 20x GTB 20x GTB 20x BTB   TB Low Row w/ Sup 20x BTB  20x GTB 20x GTB 20x GTB 20x GTB 20x GTB 20x BTB                         Education            Ther Ex           Ext Stretch at Wall 2x10  2x10 2x10 2x10 2x10 2x10 2x10   Elbow Flex/Ext w/ Towel 2x10  2x10 2x10 2x10 2x10 2x10 2x10   Ext/Sup Stretch at Wall 2x10  2x10 2x10 2x10 2x10 2x10 2x10   Elbow AROM 20x pro sup  20x pro sup 20x pro sup 20x pro sup 20x pro sup 20x pro sup 20x pro sup   Wrist 20x flex ext  20x flex ext 20x flex ext 20x flex ext 20x flex ext 20x flex ext 20x flex ext   Wall Slides 2x10  2x10 2x10 2x10 2x10 2x10 2x10   Plinth Wrist Ext Stretch 2x10  2x10 2x10 2x10 2x10 2x10 2x10   Plinth Rock Backs 2x10  2x10 2x10 2x10 2x10 2x10 2x10   TB Elbow Flex   2x10 RTB 2x10 RTB 2x10 RTB 2x10 GTB 2x10 GTB    TB Supination    2x10 RTB 2x10 RTB 2x10 RTB 2x10 RTB 2x10 RTB    DB Bicep Curl 2x10 5#       2x10 5#              Ther Activity           KB carry 2 laps 15#       1 lap ea  10# & 15#              Gait Training                                 Modalities

## 2022-08-24 NOTE — TELEPHONE ENCOUNTER
[address] [facility]  office about if pt needs to be predeicated prior to dental work  The message from Dr Massiel Dickinson was relayed to the office

## 2022-08-25 ENCOUNTER — OFFICE VISIT (OUTPATIENT)
Dept: PHYSICAL THERAPY | Facility: CLINIC | Age: 42
End: 2022-08-25
Payer: COMMERCIAL

## 2022-08-25 DIAGNOSIS — Z47.89 AFTERCARE FOLLOWING SURGERY OF THE MUSCULOSKELETAL SYSTEM: Primary | ICD-10-CM

## 2022-08-25 DIAGNOSIS — M25.521 RIGHT ELBOW PAIN: ICD-10-CM

## 2022-08-25 PROCEDURE — 97110 THERAPEUTIC EXERCISES: CPT | Performed by: PHYSICAL THERAPIST

## 2022-08-25 PROCEDURE — 97112 NEUROMUSCULAR REEDUCATION: CPT | Performed by: PHYSICAL THERAPIST

## 2022-08-25 PROCEDURE — 97140 MANUAL THERAPY 1/> REGIONS: CPT | Performed by: PHYSICAL THERAPIST

## 2022-08-25 NOTE — PROGRESS NOTES
Daily Note     Today's date: 2022  Patient name: Sheron Dawn  : 1980  MRN: [id number]  Referring provider: Arturo Ayala DO  Dx:   Encounter Diagnosis     ICD-10-CM    1  Aftercare following surgery of the musculoskeletal system  Z47 89    2  Right elbow pain  M25 521        Start Time: 1615  Stop Time: 1700  Total time in clinic (min): 45 minutes    Subjective: Pt reports feeling good today  No complaints of any new pain or limitations  Objective: See treatment diary below      Assessment: Tolerated treatment well  Pt tolerated new strength and ROM exercises to address further functional activity participation  Advised to continue with braces at home and perform self stretches as needed for mobility  Patient would benefit from continued PT  Plan: Continue per plan of care  [facility], under my direct supervision         Precautions: S/P Right Radial Head Replacement (22)    Access Code: [email or web address]     Manuals    R Elbow PROM KCB [facility] [address] [facility]   R Elbow STM KCB [facility] [address] [facility]                         Neuro Re-Ed           PNF D1/D2 20x D2 BTB 20x D2 BTB  20x D2 GTB 20x D2 GTB 20x D2 GTB 20x D2 GTB 20x D2 BTB   TB OH Ext w/ Row 20x BTB 20x BTB  20x GTB 20x GTB 20x GTB 20x GTB 20x BTB   TB Low Row w/ Sup 20x BTB 20x BTB  20x GTB 20x GTB 20x GTB 20x GTB 20x BTB                         Education            Ther Ex           Ext Stretch at [facility] 2x10 D/C  2x10 2x10 2x10 2x10 2x10   Elbow Flex/Ext w/ Towel 2x10 D/C   2x10 2x10 2x10 2x10 2x10   Ext/Sup Stretch at Wall 2x10 D/C  2x10 2x10 2x10 2x10 2x10   Ext/Sup Stretch on Plinth  2x10         Ext Stretch at Wall  2x10         Elbow AROM w/ weight  20x pro sup 20x  2#  pro sup  20x pro sup 20x pro sup 20x pro sup 20x pro sup 20x pro sup   Wrist w/ weight 20x flex ext 20  2#  flex ext  20x flex ext 20x flex ext 20x flex ext 20x flex ext 20x flex ext   Wall Slides 2x10 HEP  2x10 2x10 2x10 2x10 2x10   Plinth Wrist Ext Stretch 2x10 HEP  2x10 2x10 2x10 2x10 2x10   Plinth table taps 2x10 2x10         Plinth table push-offs  2x10         Plinth Rock Backs  2x10 D/C  2x10 2x10 2x10 2x10 2x10   TB Elbow Flex    2x10 RTB 2x10 RTB 2x10 GTB 2x10 GTB    TB Supination     2x10 RTB 2x10 RTB 2x10 RTB 2x10 RTB    DB Bicep Curl 2x10 5# 2x10  5#      2x10 5#              Ther Activity           KB carry 2 laps 15# 2 laps 15#      1 lap ea  10# & 15#              Gait Training                                 Modalities

## 2022-08-30 ENCOUNTER — OFFICE VISIT (OUTPATIENT)
Dept: PHYSICAL THERAPY | Facility: CLINIC | Age: 42
End: 2022-08-30
Payer: COMMERCIAL

## 2022-08-30 DIAGNOSIS — M25.521 RIGHT ELBOW PAIN: ICD-10-CM

## 2022-08-30 DIAGNOSIS — Z47.89 AFTERCARE FOLLOWING SURGERY OF THE MUSCULOSKELETAL SYSTEM: Primary | ICD-10-CM

## 2022-08-30 PROCEDURE — 97112 NEUROMUSCULAR REEDUCATION: CPT | Performed by: PHYSICAL THERAPIST

## 2022-08-30 PROCEDURE — 97110 THERAPEUTIC EXERCISES: CPT | Performed by: PHYSICAL THERAPIST

## 2022-08-30 NOTE — PROGRESS NOTES
Daily Note     Today's date: 2022  Patient name: Dilma Cuenca  : 1980  MRN: [id number]  Referring provider: Henna Amato DO  Dx:   Encounter Diagnosis     ICD-10-CM    1  Aftercare following surgery of the musculoskeletal system  Z47 89    2  Right elbow pain  M25 521        Start Time: 174  Stop Time: 1820  Total time in clinic (min): 35 minutes    Subjective: Pt reports feeling good today  States she is still having some stiffness when doing ADL's  Reports some tingling from extension braces at night  Objective: See treatment diary below      Assessment: Tolerated treatment well  Pt responding well to cues during strengthening exercises  Pt experienced some fatigue after increase in weight during ROM exercises which decreased by end of session  Educated pt on adjusting tightness of extension brace at home to see if that reduces the tingling sensation  Patient would benefit from continued PT      Plan: Continue per plan of care  Patient treated by [facility], [facility], under my direct supervision         Precautions: S/P Right Radial Head Replacement (22)    Access Code: [facility]     Manuals    R Elbow PROM KCB [facility]     KCB KCB   R Elbow STM KCB [facility]     KCB KCB                         Neuro Re-Ed           PNF D1/D2 20x D2 BTB 20x D2 BTB 20x  D2  BTB    20x D2 GTB 20x D2 BTB   TB OH Ext w/ Row 20x BTB 20x BTB 20x   BTB    20x GTB 20x BTB   TB Low Row w/ Sup 20x BTB 20x BTB 20x  BTB    20x GTB 20x BTB                         Education            Ther Ex           Ext Stretch at Wall 2x10 D/C     2x10 2x10   Elbow Flex/Ext w/ Towel 2x10 D/C      2x10 2x10   Ext/Sup Stretch at Wall 2x10 D/C     2x10 2x10   Ext/Sup Stretch on Plinth  2x10 2x10        Ext Stretch at Wall  2x10 2x10        Elbow AROM w/ weight  20x pro sup 20x  2#  pro sup 20x  2#  pro sup       20x pro sup 20x pro sup   Wrist w/ weight 20x flex ext 20  2#  flex ext 20  2#  flex ext    20x flex ext 20x flex ext   Wall Slides 2x10 HEP     2x10 2x10   Plinth Wrist Ext Stretch 2x10 HEP     2x10 2x10   Plinth table taps 2x10 2x10 2x10        Plinth table push-offs  2x10 2x10        Plinth Rock Backs  2x10 D/C     2x10 2x10   TB Elbow Flex       2x10 GTB    TB Supination        2x10 RTB    DB Bicep Curl 2x10 5# 2x10  5# 2x10  6#, drop to 5#     2x10 5#              Ther Activity           KB carry 2 laps 15# 2 laps 15# 2 laps 15#     1 lap ea  10# & 15#              Gait Training                                 Modalities

## 2022-09-06 ENCOUNTER — EVALUATION (OUTPATIENT)
Dept: PHYSICAL THERAPY | Facility: OTHER | Age: 42
End: 2022-09-06
Payer: COMMERCIAL

## 2022-09-06 DIAGNOSIS — Z47.89 AFTERCARE FOLLOWING SURGERY OF THE MUSCULOSKELETAL SYSTEM: Primary | ICD-10-CM

## 2022-09-06 DIAGNOSIS — M25.521 RIGHT ELBOW PAIN: ICD-10-CM

## 2022-09-06 PROCEDURE — 97110 THERAPEUTIC EXERCISES: CPT | Performed by: PHYSICAL THERAPIST

## 2022-09-06 PROCEDURE — 97164 PT RE-EVAL EST PLAN CARE: CPT | Performed by: PHYSICAL THERAPIST

## 2022-09-06 PROCEDURE — 97140 MANUAL THERAPY 1/> REGIONS: CPT | Performed by: PHYSICAL THERAPIST

## 2022-09-11 NOTE — PROGRESS NOTES
PT Evaluation   Today's date: 2022  Patient name: Best Juarez  : 1980  MRN: [id number]  Referring provider: Jamel Osullivan DO  Dx:   Encounter Diagnosis     ICD-10-CM    1  Aftercare following surgery of the musculoskeletal system  Z47 89    2  Right elbow pain  M25 521      Start Time: 1700  Stop Time: 1745  Total time in clinic (min): 45 minutes    Assessment  Assessment details: Best Juarez is a 38 y o  female who presents with complaints of Aftercare following surgery of the musculoskeletal system  (primary encounter diagnosis), Right elbow pain  No further referral appears necessary at this time based upon examination results  Patient presents to PT with impaired strength, impaired ROM, decreased flexibility and impaired ability to complete IADLs  Prognosis is good given HEP compliance and PT 2-3x/wk  Positive prognostic indicators include positive attitude toward recovery  Please contact me if you have any questions or recommendations  Thank you for the opportunity to share in  Kassy's care         Impairments: abnormal coordination, abnormal muscle firing, abnormal or restricted ROM, activity intolerance, impaired physical strength, lacks appropriate home exercise program, pain with function and poor body mechanics    Symptom irritability: moderateBarriers to therapy: Complex injury and surgery (radial head replacement)   Understanding of Dx/Px/POC: good   Prognosis: good    Goals  Short Term:  Pt will report decreased levels of pain by at least 2 subjective ratings in 4 weeks  Pt will demonstrate improved ROM by at least 10 degrees in 4 weeks  Pt will demonstrate improved strength by 1/2 grade  Long Term:   Pt will be independent in their HEP in 8 weeks  Pt will be be pain free with IADL's  Pt will demonstrate improved FOTO, > 80         Plan  Plan details: Prognosis above is given PT services 2x/week tapering to 1x/week over the next 3 months and home program adherence  Patient would benefit from: skilled physical therapy  Planned modality interventions: thermotherapy: hydrocollator packs, cryotherapy, electrical stimulation/Russian stimulation and low level laser therapy  Planned therapy interventions: activity modification, joint mobilization, manual therapy, motor coordination training, neuromuscular re-education, patient education, self care, therapeutic activities, therapeutic exercise, graded activity, home exercise program, abdominal trunk stabilization, balance, flexibility, functional ROM exercises, stretching and strengthening  Frequency: 2x week  Duration in weeks: 12  Plan of Care beginning date: 9/6/2022  Plan of Care expiration date: 12/6/2022  Treatment plan discussed with: patient        Subjective Evaluation    History of Present Illness  Mechanism of injury: [facility]  She said she felt like she made a lot of progress there but still feels her ROM is limited  She expressed a desire to transfer in order to work with somebody who has seen a number of elbow injuries/surgeries  Patient said that her surgeon was not very happy with her progress when she went to go see him  She said she has not seen him recently because of conflicts in their schedule  She told PT "I'm 38years old and I don't want to be limited by my elbow "  Patient denies any pain at this time but reports restrictions with reaching across her body, reaching in front of her and washing her head            Objective     General Comments:      Elbow Comments   UQS: WNL     Observation: surgical site has healed very nicely    Palpation: increased tone in the region of the brachioradialis     ROM   AROM Elbow Flexion 120*   AROM Elbow Extension -5*   AROM Forearm Pronation 70*   AROM Forearm Supination 70*     Joint Mobility Testing   Elbow  PRUJ hypomobile   UHJ hypomobile   Ribs  1st Rib Elevated on R   Hypomobile Rib 3 on R   Thoracic Spine   Hypomobile throughout     Precautions:     Daily Treatment Log  Manuals             GISTM              C/S Glides             T/S G5                           Neuro Re-Ed             Diaphragm Release             TA Contraction              Diaphragm Breathing             Elbow/Forearm Isometrics                                                    Ther Ex             Elbow Flex Sustained Hold             Elbow Ext Sustained Hold             Curls             Tri Ext             Rows             Low Rows             ER             IR             Ther Activity                                       Gait Training                                       Modalities

## 2022-09-12 ENCOUNTER — OFFICE VISIT (OUTPATIENT)
Dept: PHYSICAL THERAPY | Facility: OTHER | Age: 42
End: 2022-09-12
Payer: COMMERCIAL

## 2022-09-12 DIAGNOSIS — M25.521 RIGHT ELBOW PAIN: ICD-10-CM

## 2022-09-12 DIAGNOSIS — Z47.89 AFTERCARE FOLLOWING SURGERY OF THE MUSCULOSKELETAL SYSTEM: Primary | ICD-10-CM

## 2022-09-12 PROCEDURE — 97140 MANUAL THERAPY 1/> REGIONS: CPT | Performed by: PHYSICAL THERAPIST

## 2022-09-12 PROCEDURE — 97110 THERAPEUTIC EXERCISES: CPT | Performed by: PHYSICAL THERAPIST

## 2022-09-12 NOTE — PROGRESS NOTES
Pt transferred to [address] [place] [address] to continue with PT  Pt to be d/c from [facility]

## 2022-09-14 ENCOUNTER — APPOINTMENT (OUTPATIENT)
Dept: PHYSICAL THERAPY | Facility: OTHER | Age: 42
End: 2022-09-14
Payer: COMMERCIAL

## 2022-09-15 ENCOUNTER — OFFICE VISIT (OUTPATIENT)
Dept: PHYSICAL THERAPY | Facility: OTHER | Age: 42
End: 2022-09-15
Payer: COMMERCIAL

## 2022-09-15 DIAGNOSIS — M25.521 RIGHT ELBOW PAIN: ICD-10-CM

## 2022-09-15 DIAGNOSIS — Z47.89 AFTERCARE FOLLOWING SURGERY OF THE MUSCULOSKELETAL SYSTEM: Primary | ICD-10-CM

## 2022-09-15 PROCEDURE — 97112 NEUROMUSCULAR REEDUCATION: CPT | Performed by: PHYSICAL THERAPIST

## 2022-09-15 PROCEDURE — 97110 THERAPEUTIC EXERCISES: CPT | Performed by: PHYSICAL THERAPIST

## 2022-09-15 PROCEDURE — 97140 MANUAL THERAPY 1/> REGIONS: CPT | Performed by: PHYSICAL THERAPIST

## 2022-09-18 NOTE — PROGRESS NOTES
PT Evaluation   Today's date: 2022  Patient name: Xavier Howell  : 1980  MRN: [id number]  Referring provider: Clementina Sexton DO  Dx:   Encounter Diagnosis     ICD-10-CM    1  Aftercare following surgery of the musculoskeletal system  Z47 89    2  Right elbow pain  M25 521                 Assessment  Assessment details: Xavier Howell is a 38 y o  female who presents with complaints of Aftercare following surgery of the musculoskeletal system  (primary encounter diagnosis), Right elbow pain  No further referral appears necessary at this time based upon examination results  Patient presents to PT with impaired strength, impaired ROM, decreased flexibility and impaired ability to complete IADLs  Prognosis is good given HEP compliance and PT 2-3x/wk  Positive prognostic indicators include positive attitude toward recovery  Please contact me if you have any questions or recommendations  Thank you for the opportunity to share in  Kassy's care  Impairments: abnormal coordination, abnormal muscle firing, abnormal or restricted ROM, activity intolerance, impaired physical strength, lacks appropriate home exercise program, pain with function and poor body mechanics    Symptom irritability: moderateBarriers to therapy: Complex injury and surgery (radial head replacement)   Understanding of Dx/Px/POC: good   Prognosis: good    Goals  Short Term:  Pt will report decreased levels of pain by at least 2 subjective ratings in 4 weeks  Pt will demonstrate improved ROM by at least 10 degrees in 4 weeks  Pt will demonstrate improved strength by 1/2 grade  Long Term:   Pt will be independent in their HEP in 8 weeks  Pt will be be pain free with IADL's  Pt will demonstrate improved FOTO, > 80         Plan  Plan details: Prognosis above is given PT services 2x/week tapering to 1x/week over the next 3 months and home program adherence    Patient would benefit from: skilled physical therapy  Planned modality interventions: thermotherapy: hydrocollator packs, cryotherapy, electrical stimulation/Russian stimulation and low level laser therapy  Planned therapy interventions: activity modification, joint mobilization, manual therapy, motor coordination training, neuromuscular re-education, patient education, self care, therapeutic activities, therapeutic exercise, graded activity, home exercise program, abdominal trunk stabilization, balance, flexibility, functional ROM exercises, stretching and strengthening  Frequency: 2x week  Duration in weeks: 12  Plan of Care beginning date: 9/6/2022  Plan of Care expiration date: 12/6/2022  Treatment plan discussed with: patient        Subjective Evaluation    History of Present Illness  Mechanism of injury: Patient is a transfer from [place]  She said she felt like she made a lot of progress there but still feels her ROM is limited  She expressed a desire to transfer in order to work with somebody who has seen a number of elbow injuries/surgeries  Patient said that her surgeon was not very happy with her progress when she went to go see him  She said she has not seen him recently because of conflicts in their schedule  She told PT "I'm 38years old and I don't want to be limited by my elbow "  Patient denies any pain at this time but reports restrictions with reaching across her body, reaching in front of her and washing her head            Objective     General Comments:      Elbow Comments   UQS: WNL     Observation: surgical site has healed very nicely    Palpation: increased tone in the region of the brachioradialis     ROM   AROM Elbow Flexion 120*   AROM Elbow Extension -5*   AROM Forearm Pronation 70*   AROM Forearm Supination 70*     Joint Mobility Testing   Elbow  PRUJ hypomobile   UHJ hypomobile   Ribs  1st Rib Elevated on R   Hypomobile Rib 3 on R   Thoracic Spine   Hypomobile throughout     Precautions:     Daily Treatment Log  Manuals             GISTM C/S Glides             T/S G5                           Neuro Re-Ed             Diaphragm Release             TA Contraction              Diaphragm Breathing             Elbow/Forearm Isometrics                                                    Ther Ex             Elbow Flex Sustained Hold             Elbow Ext Sustained Hold             Curls             Tri Ext             Rows             Low Rows             ER             IR             Ther Activity                                       Gait Training                                       Modalities

## 2022-09-18 NOTE — PROGRESS NOTES
PT Evaluation   Today's date: 2022  Patient name: Teagan Park  : 1980  MRN: [id number]  Referring provider: Anna Nino DO  Dx:   Encounter Diagnosis     ICD-10-CM    1  Aftercare following surgery of the musculoskeletal system  Z47 89    2  Right elbow pain  M25 521                 Assessment  Assessment details: Teagan Park is a 38 y o  female who presents with complaints of Aftercare following surgery of the musculoskeletal system  (primary encounter diagnosis), Right elbow pain  No further referral appears necessary at this time based upon examination results  Patient presents to PT with impaired strength, impaired ROM, decreased flexibility and impaired ability to complete IADLs  Prognosis is good given HEP compliance and PT 2-3x/wk  Positive prognostic indicators include positive attitude toward recovery  Please contact me if you have any questions or recommendations  Thank you for the opportunity to share in  Kassy's care  Impairments: abnormal coordination, abnormal muscle firing, abnormal or restricted ROM, activity intolerance, impaired physical strength, lacks appropriate home exercise program, pain with function and poor body mechanics    Symptom irritability: moderateBarriers to therapy: Complex injury and surgery (radial head replacement)   Understanding of Dx/Px/POC: good   Prognosis: good    Goals  Short Term:  Pt will report decreased levels of pain by at least 2 subjective ratings in 4 weeks  Pt will demonstrate improved ROM by at least 10 degrees in 4 weeks  Pt will demonstrate improved strength by 1/2 grade  Long Term:   Pt will be independent in their HEP in 8 weeks  Pt will be be pain free with IADL's  Pt will demonstrate improved FOTO, > 80         Plan  Plan details: Prognosis above is given PT services 2x/week tapering to 1x/week over the next 3 months and home program adherence    Patient would benefit from: skilled physical therapy  Planned modality interventions: thermotherapy: hydrocollator packs, cryotherapy, electrical stimulation/Russian stimulation and low level laser therapy  Planned therapy interventions: activity modification, joint mobilization, manual therapy, motor coordination training, neuromuscular re-education, patient education, self care, therapeutic activities, therapeutic exercise, graded activity, home exercise program, abdominal trunk stabilization, balance, flexibility, functional ROM exercises, stretching and strengthening  Frequency: 2x week  Duration in weeks: 12  Plan of Care beginning date: 9/6/2022  Plan of Care expiration date: 12/6/2022  Treatment plan discussed with: patient        Subjective Evaluation    History of Present Illness  Mechanism of injury: Patient is a transfer from [place]  She said she felt like she made a lot of progress there but still feels her ROM is limited  She expressed a desire to transfer in order to work with somebody who has seen a number of elbow injuries/surgeries  Patient said that her surgeon was not very happy with her progress when she went to go see him  She said she has not seen him recently because of conflicts in their schedule  She told PT "I'm 38years old and I don't want to be limited by my elbow "  Patient denies any pain at this time but reports restrictions with reaching across her body, reaching in front of her and washing her head            Objective     General Comments:      Elbow Comments   UQS: WNL     Observation: surgical site has healed very nicely    Palpation: increased tone in the region of the brachioradialis     ROM   AROM Elbow Flexion 120*   AROM Elbow Extension -5*   AROM Forearm Pronation 70*   AROM Forearm Supination 70*     Joint Mobility Testing   Elbow  PRUJ hypomobile   UHJ hypomobile   Ribs  1st Rib Elevated on R   Hypomobile Rib 3 on R   Thoracic Spine   Hypomobile throughout     Precautions:     Daily Treatment Log  Manuals             GISTM C/S Glides             T/S G5                           Neuro Re-Ed             Diaphragm Release             TA Contraction              Diaphragm Breathing             Elbow/Forearm Isometrics                                                    Ther Ex             Elbow Flex Sustained Hold             Elbow Ext Sustained Hold             Curls             Tri Ext             Rows             Low Rows             ER             IR             Ther Activity                                       Gait Training                                       Modalities

## 2022-09-20 ENCOUNTER — OFFICE VISIT (OUTPATIENT)
Dept: PHYSICAL THERAPY | Facility: OTHER | Age: 42
End: 2022-09-20
Payer: COMMERCIAL

## 2022-09-20 DIAGNOSIS — M25.521 RIGHT ELBOW PAIN: ICD-10-CM

## 2022-09-20 DIAGNOSIS — Z47.89 AFTERCARE FOLLOWING SURGERY OF THE MUSCULOSKELETAL SYSTEM: Primary | ICD-10-CM

## 2022-09-20 PROCEDURE — 97112 NEUROMUSCULAR REEDUCATION: CPT | Performed by: PHYSICAL THERAPIST

## 2022-09-20 PROCEDURE — 97140 MANUAL THERAPY 1/> REGIONS: CPT | Performed by: PHYSICAL THERAPIST

## 2022-09-20 PROCEDURE — 97110 THERAPEUTIC EXERCISES: CPT | Performed by: PHYSICAL THERAPIST

## 2022-09-22 ENCOUNTER — HOSPITAL ENCOUNTER (OUTPATIENT)
Dept: RADIOLOGY | Facility: HOSPITAL | Age: 42
End: 2022-09-22
Attending: ORTHOPAEDIC SURGERY
Payer: COMMERCIAL

## 2022-09-22 ENCOUNTER — OFFICE VISIT (OUTPATIENT)
Dept: OBGYN CLINIC | Facility: CLINIC | Age: 42
End: 2022-09-22
Payer: COMMERCIAL

## 2022-09-22 VITALS
SYSTOLIC BLOOD PRESSURE: 104 MMHG | WEIGHT: 148 LBS | HEIGHT: 66 IN | BODY MASS INDEX: 23.78 KG/M2 | OXYGEN SATURATION: 99 % | HEART RATE: 71 BPM | DIASTOLIC BLOOD PRESSURE: 78 MMHG

## 2022-09-22 DIAGNOSIS — S52.121A CLOSED DISPLACED FRACTURE OF HEAD OF RIGHT RADIUS, INITIAL ENCOUNTER: Primary | ICD-10-CM

## 2022-09-22 DIAGNOSIS — S52.121A CLOSED DISPLACED FRACTURE OF HEAD OF RIGHT RADIUS, INITIAL ENCOUNTER: ICD-10-CM

## 2022-09-22 PROCEDURE — 99213 OFFICE O/P EST LOW 20 MIN: CPT | Performed by: ORTHOPAEDIC SURGERY

## 2022-09-22 PROCEDURE — 73080 X-RAY EXAM OF ELBOW: CPT

## 2022-09-22 NOTE — PROGRESS NOTES
[address] [address]  [address]  [id number]  1980    ORTHOPAEDIC SURGERY OUTPATIENT NOTE  2022      HISTORY:  38 y o  female  presents the clinic follow-up her right radial head arthroplasty performed on 2022  She is doing very well  She reports no pain  She reports a little bit of stiffness but is getting back to activity as tolerated  She has completed physical therapy  She denies numbness or tingling in the upper extremity  0/10 pain, SANE score 90%  Past Medical History:   Diagnosis Date    [facility])     no recurrence since  or so    Menorrhagia     Vitamin D deficiency        Past Surgical History:   Procedure Laterality Date     SECTION      MA HYSTEROSCOPY,W/ENDO BX N/A 2021    Procedure: DILATATION AND CURETTAGE (D&C) WITH HYSTEROSCOPY;  Surgeon: Blaise Torres MD;  [facility];  Service: Gynecology    MA HYSTEROSCOPY,W/ENDOMETRIAL ABLATION N/A 2021    Procedure: ABLATION ENDOMETRIAL VIVIANA;  Surgeon: Blaise Torres MD;  [facility];  Service: Gynecology    MA RECONSTRUCT RADIAL HEAD W IMPLANT Right 2022    Procedure: ARTHROPLASTY RADIAL HEAD;  Surgeon: Tommy Kapoor;  Location: [facility];  Service: Orthopedics    MA THYROID LOBECTOMY,UNILAT Right 2017    Procedure: THYROID LOBECTOMY WITH RECURRENT LARYNGEAL NERVE MONITORING (IMPULSE MONITORING);   Surgeon: Song Bee MD;  [facility];  Service: ENT       Social History     Socioeconomic History    Marital status: /Civil Union     Spouse name: Not on file    Number of children: Not on file    Years of education: Not on file    Highest education level: Not on file   Occupational History    Not on file   Tobacco Use    Smoking status: Never Smoker    Smokeless tobacco: Never Used   Vaping Use    Vaping Use: Never used Substance and Sexual Activity    Alcohol use: Yes     Comment: 4 drinks per week    Drug use: No    Sexual activity: Yes     Partners: Male     Birth control/protection: Male Sterilization   Other Topics Concern    Not on file   Social History Narrative    Not on file     Social Determinants of Health     Financial Resource Strain: Not on file   Food Insecurity: Not on file   Transportation Needs: Not on file   Physical Activity: Not on file   Stress: Not on file   Social Connections: Not on file   Intimate Partner Violence: Not on file   Housing Stability: Not on file       Family History   Problem Relation Age of Onset    Thyroid disease Mother     Breast cancer Maternal Grandmother     Breast cancer Maternal Aunt         Patient's Medications   New Prescriptions    No medications on file   Previous Medications    ACETAMINOPHEN (TYLENOL) 650 MG CR TABLET    Take 1 tablet (650 mg total) by mouth every 8 (eight) hours as needed for mild pain    ESCITALOPRAM (LEXAPRO) 5 MG TABLET    Take 5 mg by mouth daily at bedtime     IBUPROFEN (MOTRIN) 600 MG TABLET    Take 1 tablet (600 mg total) by mouth every 6 (six) hours as needed for mild pain    ONDANSETRON (ZOFRAN-ODT) 4 MG DISINTEGRATING TABLET    Take 1 tablet (4 mg total) by mouth every 8 (eight) hours as needed for nausea or vomiting   Modified Medications    No medications on file   Discontinued Medications    No medications on file       No Known Allergies     /78 (BP Location: Left arm, Patient Position: Sitting, Cuff Size: Large)   Pulse 71   Ht 5' 6" (1 676 m)   Wt 67 1 kg (148 lb)   SpO2 99%   BMI 23 89 kg/m²      REVIEW OF SYSTEMS:  Constitutional: Negative  HEENT: Negative  Respiratory: Negative  Skin: Negative  Neurological: Negative  Psychiatric/Behavioral: Negative  Musculoskeletal: Negative except for that mentioned in the HPI      /78 (BP Location: Left arm, Patient Position: Sitting, Cuff Size: Large)   Pulse 71   Ht 5' 6" (1 676 m)   Wt 67 1 kg (148 lb)   SpO2 99%   BMI 23 89 kg/m²   Gen: No acute distress, resting comfortably in bed  HEENT: Eyes clear, moist mucus membranes, hearing intact  Respiratory: No audible wheezing or stridor  Cardiovascular: Well Perfused peripherally, 2+ distal pulse  Abdomen: nondistended, no peritoneal signs     PHYSICAL EXAM:      Right elbow  Incisions well healed  ROM 8-135  Full supination and pronation  5/5 Extension  5/5 flexion  Sensation intact distally  Brisk CR    IMAGING:  XR of the right elbow demonstrate radial head arthroplasty in expected position without signs of failure or loosening  ASSESSMENT AND PLAN:  38 y o  female  status post right radial head arthroplasty performed on 05/23/2022  She is doing very well  Sane score 90%  She will return to activities as tolerated  She can continue with home exercises to work on strength but her overall function range of motion and strength are improving  At this point her x-rays are stable we will see her back as needed      Scribe Attestation      I,:  Cecilia Desai PA-C am acting as a scribe while in the presence of the attending physician :       I,:  Emily Lopez personally performed the services described in this documentation    as scribed in my presence :               Jane Hillist,:  Cecilia Desai PA-C am acting as a scribe while in the presence of the attending physician :       I,:  Emily Lopez personally performed the services described in this documentation    as scribed in my presence :

## 2022-09-25 NOTE — PROGRESS NOTES
PT Evaluation   Today's date: 2022  Patient name: Andrew Garcia  : 1980  MRN: [id number]  Referring provider: Addis Francis DO  Dx:   Encounter Diagnosis     ICD-10-CM    1  Aftercare following surgery of the musculoskeletal system  Z47 89    2  Right elbow pain  M25 521                 Assessment  Assessment details: Andrew Garcia is a 38 y o  female who presents with complaints of Aftercare following surgery of the musculoskeletal system  (primary encounter diagnosis), Right elbow pain  No further referral appears necessary at this time based upon examination results  Patient presents to PT with impaired strength, impaired ROM, decreased flexibility and impaired ability to complete IADLs  Prognosis is good given HEP compliance and PT 2-3x/wk  Positive prognostic indicators include positive attitude toward recovery  Please contact me if you have any questions or recommendations  Thank you for the opportunity to share in  Kassy's care  Impairments: abnormal coordination, abnormal muscle firing, abnormal or restricted ROM, activity intolerance, impaired physical strength, lacks appropriate home exercise program, pain with function and poor body mechanics    Symptom irritability: moderateBarriers to therapy: Complex injury and surgery (radial head replacement)   Understanding of Dx/Px/POC: good   Prognosis: good    Goals  Short Term:  Pt will report decreased levels of pain by at least 2 subjective ratings in 4 weeks  Pt will demonstrate improved ROM by at least 10 degrees in 4 weeks  Pt will demonstrate improved strength by 1/2 grade  Long Term:   Pt will be independent in their HEP in 8 weeks  Pt will be be pain free with IADL's  Pt will demonstrate improved FOTO, > 80         Plan  Plan details: Prognosis above is given PT services 2x/week tapering to 1x/week over the next 3 months and home program adherence    Patient would benefit from: skilled physical therapy  Planned modality interventions: thermotherapy: hydrocollator packs, cryotherapy, electrical stimulation/Russian stimulation and low level laser therapy  Planned therapy interventions: activity modification, joint mobilization, manual therapy, motor coordination training, neuromuscular re-education, patient education, self care, therapeutic activities, therapeutic exercise, graded activity, home exercise program, abdominal trunk stabilization, balance, flexibility, functional ROM exercises, stretching and strengthening  Frequency: 2x week  Duration in weeks: 12  Plan of Care beginning date: 9/6/2022  Plan of Care expiration date: 12/6/2022  Treatment plan discussed with: patient        Subjective Evaluation    History of Present Illness  Mechanism of injury: Patient is a transfer from [facility]  She said she felt like she made a lot of progress there but still feels her ROM is limited  She expressed a desire to transfer in order to work with somebody who has seen a number of elbow injuries/surgeries  Patient said that her surgeon was not very happy with her progress when she went to go see him  She said she has not seen him recently because of conflicts in their schedule  She told PT "I'm 38years old and I don't want to be limited by my elbow "  Patient denies any pain at this time but reports restrictions with reaching across her body, reaching in front of her and washing her head            Objective     General Comments:      Elbow Comments   UQS: WNL     Observation: surgical site has healed very nicely    Palpation: increased tone in the region of the brachioradialis     ROM   AROM Elbow Flexion 120*   AROM Elbow Extension -5*   AROM Forearm Pronation 70*   AROM Forearm Supination 70*     Joint Mobility Testing   Elbow  PRUJ hypomobile   UHJ hypomobile   Ribs  1st Rib Elevated on R   Hypomobile Rib 3 on R   Thoracic Spine   Hypomobile throughout     Precautions:     Daily Treatment Log  Manuals             GISTM C/S Glides             T/S G5                           Neuro Re-Ed             Diaphragm Release             TA Contraction              Diaphragm Breathing             Elbow/Forearm Isometrics                                                    Ther Ex             Elbow Flex Sustained Hold             Elbow Ext Sustained Hold             Curls             Tri Ext             Rows             Low Rows             ER             IR             Ther Activity                                       Gait Training                                       Modalities

## 2022-09-28 ENCOUNTER — OFFICE VISIT (OUTPATIENT)
Dept: PHYSICAL THERAPY | Facility: OTHER | Age: 42
End: 2022-09-28
Payer: COMMERCIAL

## 2022-09-28 DIAGNOSIS — M25.521 RIGHT ELBOW PAIN: ICD-10-CM

## 2022-09-28 DIAGNOSIS — Z47.89 AFTERCARE FOLLOWING SURGERY OF THE MUSCULOSKELETAL SYSTEM: Primary | ICD-10-CM

## 2022-09-28 PROCEDURE — 97140 MANUAL THERAPY 1/> REGIONS: CPT | Performed by: PHYSICAL THERAPIST

## 2022-09-28 PROCEDURE — 97112 NEUROMUSCULAR REEDUCATION: CPT | Performed by: PHYSICAL THERAPIST

## 2022-09-28 PROCEDURE — 97110 THERAPEUTIC EXERCISES: CPT | Performed by: PHYSICAL THERAPIST

## 2022-09-29 ENCOUNTER — TELEPHONE (OUTPATIENT)
Dept: OBGYN CLINIC | Facility: HOSPITAL | Age: 42
End: 2022-09-29

## 2022-09-29 NOTE — TELEPHONE ENCOUNTER
Sandra Bazan from radiology called to advise of significant findings in patient's R elbow xray dated 9/22      Please review findings

## 2022-10-02 NOTE — PROGRESS NOTES
PT Evaluation   Today's date: 2022  Patient name: Lyubov Godfrey  : 1980  MRN: [id number]  Referring provider: Jacquelyn Van DO  Dx:   Encounter Diagnosis     ICD-10-CM    1  Aftercare following surgery of the musculoskeletal system  Z47 89    2  Right elbow pain  M25 521                 Assessment  Assessment details: Lyubov Godfrey is a 38 y o  female who presents with complaints of Aftercare following surgery of the musculoskeletal system  (primary encounter diagnosis), Right elbow pain  No further referral appears necessary at this time based upon examination results  Patient presents to PT with impaired strength, impaired ROM, decreased flexibility and impaired ability to complete IADLs  Prognosis is good given HEP compliance and PT 2-3x/wk  Positive prognostic indicators include positive attitude toward recovery  Please contact me if you have any questions or recommendations  Thank you for the opportunity to share in  Kassy's care  Impairments: abnormal coordination, abnormal muscle firing, abnormal or restricted ROM, activity intolerance, impaired physical strength, lacks appropriate home exercise program, pain with function and poor body mechanics    Symptom irritability: moderateBarriers to therapy: Complex injury and surgery (radial head replacement)   Understanding of Dx/Px/POC: good   Prognosis: good    Goals  Short Term:  Pt will report decreased levels of pain by at least 2 subjective ratings in 4 weeks  Pt will demonstrate improved ROM by at least 10 degrees in 4 weeks  Pt will demonstrate improved strength by 1/2 grade  Long Term:   Pt will be independent in their HEP in 8 weeks  Pt will be be pain free with IADL's  Pt will demonstrate improved FOTO, > 80         Plan  Plan details: Prognosis above is given PT services 2x/week tapering to 1x/week over the next 3 months and home program adherence    Patient would benefit from: skilled physical therapy  Planned modality interventions: thermotherapy: hydrocollator packs, cryotherapy, electrical stimulation/Russian stimulation and low level laser therapy  Planned therapy interventions: activity modification, joint mobilization, manual therapy, motor coordination training, neuromuscular re-education, patient education, self care, therapeutic activities, therapeutic exercise, graded activity, home exercise program, abdominal trunk stabilization, balance, flexibility, functional ROM exercises, stretching and strengthening  Frequency: 2x week  Duration in weeks: 12  Plan of Care beginning date: 9/6/2022  Plan of Care expiration date: 12/6/2022  Treatment plan discussed with: patient        Subjective Evaluation    History of Present Illness  Mechanism of injury: Patient is a transfer from [facility]  She said she felt like she made a lot of progress there but still feels her ROM is limited  She expressed a desire to transfer in order to work with somebody who has seen a number of elbow injuries/surgeries  Patient said that her surgeon was not very happy with her progress when she went to go see him  She said she has not seen him recently because of conflicts in their schedule  She told PT "I'm 38years old and I don't want to be limited by my elbow "  Patient denies any pain at this time but reports restrictions with reaching across her body, reaching in front of her and washing her head            Objective     General Comments:      Elbow Comments   UQS: WNL     Observation: surgical site has healed very nicely    Palpation: increased tone in the region of the brachioradialis     ROM   AROM Elbow Flexion 120*   AROM Elbow Extension -5*   AROM Forearm Pronation 70*   AROM Forearm Supination 70*     Joint Mobility Testing   Elbow  PRUJ hypomobile   UHJ hypomobile   Ribs  1st Rib Elevated on R   Hypomobile Rib 3 on R   Thoracic Spine   Hypomobile throughout     Precautions:     Daily Treatment Log  Manuals             GISTM C/S Glides             T/S G5                           Neuro Re-Ed             Diaphragm Release             TA Contraction              Diaphragm Breathing             Elbow/Forearm Isometrics                                                    Ther Ex             Elbow Flex Sustained Hold             Elbow Ext Sustained Hold             Curls             Tri Ext             Rows             Low Rows             ER             IR             Ther Activity                                       Gait Training                                       Modalities

## 2022-10-03 ENCOUNTER — APPOINTMENT (OUTPATIENT)
Dept: PHYSICAL THERAPY | Facility: OTHER | Age: 42
End: 2022-10-03

## 2022-10-05 ENCOUNTER — OFFICE VISIT (OUTPATIENT)
Dept: PHYSICAL THERAPY | Facility: OTHER | Age: 42
End: 2022-10-05
Payer: COMMERCIAL

## 2022-10-05 DIAGNOSIS — Z47.89 AFTERCARE FOLLOWING SURGERY OF THE MUSCULOSKELETAL SYSTEM: Primary | ICD-10-CM

## 2022-10-05 DIAGNOSIS — M25.521 RIGHT ELBOW PAIN: ICD-10-CM

## 2022-10-05 PROCEDURE — 97140 MANUAL THERAPY 1/> REGIONS: CPT | Performed by: PHYSICAL THERAPIST

## 2022-10-09 NOTE — PROGRESS NOTES
PT Evaluation   Today's date: 2022  Patient name: Nisa Munguia  : 1980  MRN: [id number]  Referring provider: Jose Antonio Guerrero DO  Dx:   Encounter Diagnosis     ICD-10-CM    1  Aftercare following surgery of the musculoskeletal system  Z47 89    2  Right elbow pain  M25 521                 Assessment  Assessment details: Nisa Munguia is a 38 y o  female who presents with complaints of Aftercare following surgery of the musculoskeletal system  (primary encounter diagnosis), Right elbow pain  No further referral appears necessary at this time based upon examination results  Patient presents to PT with impaired strength, impaired ROM, decreased flexibility and impaired ability to complete IADLs  Prognosis is good given HEP compliance and PT 2-3x/wk  Positive prognostic indicators include positive attitude toward recovery  Please contact me if you have any questions or recommendations  Thank you for the opportunity to share in  Kassy's care  Impairments: abnormal coordination, abnormal muscle firing, abnormal or restricted ROM, activity intolerance, impaired physical strength, lacks appropriate home exercise program, pain with function and poor body mechanics    Symptom irritability: moderateBarriers to therapy: Complex injury and surgery (radial head replacement)   Understanding of Dx/Px/POC: good   Prognosis: good    Goals  Short Term:  Pt will report decreased levels of pain by at least 2 subjective ratings in 4 weeks  Pt will demonstrate improved ROM by at least 10 degrees in 4 weeks  Pt will demonstrate improved strength by 1/2 grade  Long Term:   Pt will be independent in their HEP in 8 weeks  Pt will be be pain free with IADL's  Pt will demonstrate improved FOTO, > 80         Plan  Plan details: Prognosis above is given PT services 2x/week tapering to 1x/week over the next 3 months and home program adherence    Patient would benefit from: skilled physical therapy  Planned modality interventions: thermotherapy: hydrocollator packs, cryotherapy, electrical stimulation/Russian stimulation and low level laser therapy  Planned therapy interventions: activity modification, joint mobilization, manual therapy, motor coordination training, neuromuscular re-education, patient education, self care, therapeutic activities, therapeutic exercise, graded activity, home exercise program, abdominal trunk stabilization, balance, flexibility, functional ROM exercises, stretching and strengthening  Frequency: 2x week  Duration in weeks: 12  Plan of Care beginning date: 9/6/2022  Plan of Care expiration date: 12/6/2022  Treatment plan discussed with: patient        Subjective Evaluation    History of Present Illness  Mechanism of injury: Patient is a transfer from [place]  She said she felt like she made a lot of progress there but still feels her ROM is limited  She expressed a desire to transfer in order to work with somebody who has seen a number of elbow injuries/surgeries  Patient said that her surgeon was not very happy with her progress when she went to go see him  She said she has not seen him recently because of conflicts in their schedule  She told PT "I'm 38years old and I don't want to be limited by my elbow "  Patient denies any pain at this time but reports restrictions with reaching across her body, reaching in front of her and washing her head            Objective     General Comments:      Elbow Comments   UQS: WNL     Observation: surgical site has healed very nicely    Palpation: increased tone in the region of the brachioradialis     ROM   AROM Elbow Flexion 120*   AROM Elbow Extension -5*   AROM Forearm Pronation 70*   AROM Forearm Supination 70*     Joint Mobility Testing   Elbow  PRUJ hypomobile   UHJ hypomobile   Ribs  1st Rib Elevated on R   Hypomobile Rib 3 on R   Thoracic Spine   Hypomobile throughout     Precautions:     Daily Treatment Log  Manuals             GISTM C/S Glides             T/S G5                           Neuro Re-Ed             Diaphragm Release             TA Contraction              Diaphragm Breathing             Elbow/Forearm Isometrics                                                    Ther Ex             Elbow Flex Sustained Hold             Elbow Ext Sustained Hold             Curls             Tri Ext             Rows             Low Rows             ER             IR             Ther Activity                                       Gait Training                                       Modalities

## 2022-10-11 PROBLEM — J01.80 OTHER ACUTE SINUSITIS: Status: RESOLVED | Noted: 2022-01-11 | Resolved: 2022-10-11

## 2022-10-11 PROBLEM — R19.7 DIARRHEA OF PRESUMED INFECTIOUS ORIGIN: Status: RESOLVED | Noted: 2021-06-24 | Resolved: 2022-10-11

## 2022-11-09 DIAGNOSIS — Z12.31 SCREENING MAMMOGRAM FOR BREAST CANCER: Primary | ICD-10-CM

## 2022-12-13 ENCOUNTER — HOSPITAL ENCOUNTER (EMERGENCY)
Facility: HOSPITAL | Age: 42
Discharge: HOME | End: 2022-12-13
Attending: EMERGENCY MEDICINE

## 2022-12-13 VITALS
RESPIRATION RATE: 18 BRPM | TEMPERATURE: 97.8 F | SYSTOLIC BLOOD PRESSURE: 122 MMHG | OXYGEN SATURATION: 100 % | DIASTOLIC BLOOD PRESSURE: 59 MMHG | HEART RATE: 81 BPM

## 2022-12-13 DIAGNOSIS — L03.317 CELLULITIS AND ABSCESS OF BUTTOCK: Primary | ICD-10-CM

## 2022-12-13 DIAGNOSIS — L02.31 CELLULITIS AND ABSCESS OF BUTTOCK: Primary | ICD-10-CM

## 2022-12-13 RX ORDER — LIDOCAINE HYDROCHLORIDE AND EPINEPHRINE 10; 10 MG/ML; UG/ML
20 INJECTION, SOLUTION INFILTRATION; PERINEURAL ONCE
Status: COMPLETED | OUTPATIENT
Start: 2022-12-13 | End: 2022-12-13

## 2022-12-13 RX ORDER — LORAZEPAM 1 MG/1
1 TABLET ORAL ONCE
Status: COMPLETED | OUTPATIENT
Start: 2022-12-13 | End: 2022-12-13

## 2022-12-13 RX ORDER — SULFAMETHOXAZOLE AND TRIMETHOPRIM 800; 160 MG/1; MG/1
1 TABLET ORAL 2 TIMES DAILY
Qty: 14 TABLET | Refills: 0 | Status: SHIPPED | OUTPATIENT
Start: 2022-12-13 | End: 2022-12-20

## 2022-12-13 RX ADMIN — LIDOCAINE HYDROCHLORIDE,EPINEPHRINE BITARTRATE 20 ML: 10; .01 INJECTION, SOLUTION INFILTRATION; PERINEURAL at 18:35

## 2022-12-13 RX ADMIN — LORAZEPAM 1 MG: 1 TABLET ORAL at 17:44

## 2022-12-13 NOTE — ED ATTENDING ATTESTATION
12/13/2022  IValentina MD, saw and evaluated the patient  I have discussed the patient with the resident/non-physician practitioner and agree with the resident's/non-physician practitioner's findings, Plan of Care, and MDM as documented in the resident's/non-physician practitioner's note, except where noted  All available labs and Radiology studies were reviewed  I was present for key portions of any procedure(s) performed by the resident/non-physician practitioner and I was immediately available to provide assistance  At this point I agree with the current assessment done in the Emergency Department  I have conducted an independent evaluation of this patient a history and physical is as follows: This is a 78-year-old female with out any significant related past medical history presenting to the ED today for a complaint of an abscess on her buttock  Patient states that she had a growth on her butt, which started approximately 5 days ago  She saw her PCP, who placed her on Bactrim, but her abscess has continued to increase in size, and induration  Patient stated that Sunday she was on her way to the ED, when she noticed that her abscess was spontaneously draining  Her abscess has continued to spontaneously drain, but her pain has persisted  Her induration also has gone down slightly  On arrival her area of induration is approximately 5 cm x 4 cm  She has an area of approximately 1-1/2 cm of spontaneously draining, open wound  After consent was obtained, patient underwent abscess evacuation and drainage, in addition to any irrigation  Please see separate procedure note for details  Patient had her wound packed, and was discharged with close follow-up with wound care  Patient was given strict return precautions with which she agreed to comply  She was discharged home in stable condition felt safe going home      ED Course         Critical Care Time  Procedures

## 2022-12-14 NOTE — ED PROVIDER NOTES
History  Chief Complaint   Patient presents with   • Abscess     Pt reports having abscess on R buttocks on , seen by pcp given abx and not getting better, was not drained prior     Fermín Lerner is a 35yo female presenting with about a week of R buttock abcess  It started as a small nub which progressed to the point she sought outpatient care and was started on bactrim 5d ago  It started to drain a bit over the weekend, but over the past day or so has seemed worse  She felt hot and spent most of the day in bed over the weekend  Prior to Admission Medications   Prescriptions Last Dose Informant Patient Reported?  Taking?   acetaminophen (TYLENOL) 650 mg CR tablet   No No   Sig: Take 1 tablet (650 mg total) by mouth every 8 (eight) hours as needed for mild pain   escitalopram (LEXAPRO) 5 mg tablet   Yes No   Sig: Take 5 mg by mouth daily at bedtime    ibuprofen (MOTRIN) 600 mg tablet   No No   Sig: Take 1 tablet (600 mg total) by mouth every 6 (six) hours as needed for mild pain   ondansetron (ZOFRAN-ODT) 4 mg disintegrating tablet   No No   Sig: Take 1 tablet (4 mg total) by mouth every 8 (eight) hours as needed for nausea or vomiting   Patient not taking: Reported on 2022      Facility-Administered Medications: None       Past Medical History:   Diagnosis Date   • A-fib ([facility] [address] )     no recurrence since  or so   • Menorrhagia    • Vitamin D deficiency        Past Surgical History:   Procedure Laterality Date   •  SECTION     • MD HYSTEROSCOPY,W/ENDO BX N/A 2021    Procedure: DILATATION AND CURETTAGE (D&C) WITH HYSTEROSCOPY;  Surgeon: Pepper Corona MD;  Location[facility];  Service: Gynecology   • MD HYSTEROSCOPY,W/ENDOMETRIAL ABLATION N/A 2021    Procedure: Jessica Olp;  Surgeon: Pepper Corona MD;  [facility];  Service: Gynecology   • MD RECONSTRUCT RADIAL HEAD W IMPLANT Right 2022    Procedure: ARTHROPLASTY RADIAL HEAD;  Surgeon: Bang Collado;  Location: [facility];  Service: Orthopedics   • OR THYROID LOBECTOMY,UNILAT Right 6/21/2017    Procedure: THYROID LOBECTOMY WITH RECURRENT LARYNGEAL NERVE MONITORING (IMPULSE MONITORING); Surgeon: Lilly Lopez MD;  Location: [facility];  Service: ENT       Family History   Problem Relation Age of Onset   • Thyroid disease Mother    • Breast cancer Maternal Grandmother    • Breast cancer Maternal Aunt      I have reviewed and agree with the history as documented  E-Cigarette/Vaping   • E-Cigarette Use Never User      E-Cigarette/Vaping Substances   • Nicotine No    • THC No    • CBD No    • Flavoring No    • Other No    • Unknown No      Social History     Tobacco Use   • Smoking status: Never   • Smokeless tobacco: Never   Vaping Use   • Vaping Use: Never used   Substance Use Topics   • Alcohol use: Yes     Comment: 4 drinks per week   • Drug use: No        Review of Systems   Constitutional: Negative for chills and fever  HENT: Negative for ear pain and sore throat  Eyes: Negative for pain and visual disturbance  Respiratory: Negative for cough and shortness of breath  Cardiovascular: Negative for chest pain and palpitations  Gastrointestinal: Negative for abdominal pain and vomiting  Genitourinary: Negative for dysuria and hematuria  Musculoskeletal: Negative for arthralgias and back pain  Skin: Negative for color change and rash  R buttock abscess   Neurological: Negative for seizures and syncope  Physical Exam  ED Triage Vitals [12/13/22 1646]   Temperature Pulse Respirations Blood Pressure SpO2   97 8 °F (36 6 °C) 81 18 122/59 100 %      Temp Source Heart Rate Source Patient Position - Orthostatic VS BP Location FiO2 (%)   Oral Monitor Sitting -- --      Pain Score       --             Orthostatic Vital Signs  Vitals:    12/13/22 1646   BP: 122/59   Pulse: 81   Patient Position - Orthostatic VS: Sitting       Physical Exam  Vitals and nursing note reviewed     Constitutional: General: She is not in acute distress  Appearance: She is well-developed  HENT:      Head: Normocephalic and atraumatic  Eyes:      Conjunctiva/sclera: Conjunctivae normal    Cardiovascular:      Rate and Rhythm: Normal rate and regular rhythm  Heart sounds: No murmur heard  Pulmonary:      Effort: Pulmonary effort is normal  No respiratory distress  Breath sounds: Normal breath sounds  Abdominal:      Palpations: Abdomen is soft  Tenderness: There is no abdominal tenderness  Musculoskeletal:         General: No swelling  Cervical back: Neck supple  Skin:     General: Skin is warm and dry  Capillary Refill: Capillary refill takes less than 2 seconds  Comments: Open draining abscess of R buttock about 1 5cm across with surrounding induration about 6cm across   Neurological:      Mental Status: She is alert  Psychiatric:         Mood and Affect: Mood normal          ED Medications  Medications   lidocaine-epinephrine (XYLOCAINE/EPINEPHRINE) 1 %-1:100,000 injection 20 mL (20 mL Infiltration Given 12/13/22 1835)   LORazepam (ATIVAN) tablet 1 mg (1 mg Oral Given 12/13/22 1744)       Diagnostic Studies  Results Reviewed     None                 No orders to display         Procedures  Incision and drain    Date/Time: 12/13/2022 7:07 PM  Performed by: Gabi Taveras MD  Authorized by: Gabi Taveras MD   [place] Protocol:  Procedure performed by: [facility])  Consent: Verbal consent obtained  Risks and benefits: risks, benefits and alternatives were discussed  Consent given by: patient  Patient consent: the patient's understanding of the procedure matches consent given  Procedure consent: procedure consent matches procedure scheduled  Patient identity confirmed: verbally with patient      Patient location:  ED  Location:     Type:  Abscess    Size:  6cm diameter    Location: R buttock    Pre-procedure details:     Skin preparation:  Betadine  Sedation:     Sedation type:  Anxiolysis  Anesthesia (see MAR for exact dosages): Anesthesia method:  Local infiltration    Local anesthetic:  Lidocaine 1% WITH epi  Procedure details:     Complexity:  Simple    Needle aspiration: no      Incision types: Other (comment) (open, elongated by incision)    Scalpel blade:  11    Approach:  Open    Incision depth:  Dermal    Wound management:  Irrigated with saline and probed and deloculated    Drainage:  Purulent and bloody    Drainage amount:  Scant    Wound treatment:  Packing placed    Packing materials:  1/4 in iodoform gauze  Post-procedure details:     Patient tolerance of procedure: Tolerated well, no immediate complications          ED Course                             SBIRT 20yo+    Flowsheet Row Most Recent Value   SBIRT (25 yo +)    In order to provide better care to our patients, we are screening all of our patients for alcohol and drug use  Would it be okay to ask you these screening questions? Unable to answer at this time Filed at: 12/13/2022 1644                MDM  Number of Diagnoses or Management Options  Cellulitis and abscess of buttock  Diagnosis management comments: Pt has draining abscess of R buttock  Abscess to be irrigated and packed  Disposition  Final diagnoses:   Cellulitis and abscess of buttock     Time reflects when diagnosis was documented in both MDM as applicable and the Disposition within this note     Time User Action Codes Description Comment    12/13/2022  6:35 PM Marlena Soto Add [L02 91] Abscess     12/13/2022  6:35 PM Marlena Soto Add [L02 31,  D31 649] Cellulitis and abscess of buttock     12/13/2022  6:35 PM Marlena Soto Modify [L02 31,  V02 705] Cellulitis and abscess of buttock     12/13/2022  6:35 PM Marlena Soto Remove [L02 91] Abscess       ED Disposition     ED Disposition   Discharge    Condition   Stable    Date/Time   Tue Dec 13, 2022  6:35 PM    Comment   Ivar Cure discharge to home/self care                 Follow-up Information     Follow up With Specialties Details Why Contact Info    [facility] Call in 3 days  [address] [id number]  [address] [facility], [address] Call in 3 days  [address]  [phone]            Discharge Medication List as of 12/13/2022  6:45 PM      START taking these medications    Details   sulfamethoxazole-trimethoprim (BACTRIM DS) 800-160 mg per tablet Take 1 tablet by mouth 2 (two) times a day for 7 days smx-tmp DS (BACTRIM) 800-160 mg tabs (1tab q12 D10), Starting Tue 12/13/2022, Until Tue 12/20/2022, Normal         CONTINUE these medications which have NOT CHANGED    Details   acetaminophen (TYLENOL) 650 mg CR tablet Take 1 tablet (650 mg total) by mouth every 8 (eight) hours as needed for mild pain, Starting Tue 9/28/2021, Normal      escitalopram (LEXAPRO) 5 mg tablet Take 5 mg by mouth daily at bedtime , Starting Thu 4/29/2021, Until Mon 5/23/2022, Historical Med      ibuprofen (MOTRIN) 600 mg tablet Take 1 tablet (600 mg total) by mouth every 6 (six) hours as needed for mild pain, Starting Tue 9/28/2021, Normal      ondansetron (ZOFRAN-ODT) 4 mg disintegrating tablet Take 1 tablet (4 mg total) by mouth every 8 (eight) hours as needed for nausea or vomiting, Starting Sun 5/15/2022, Normal               PDMP Review     None           ED Provider  Attending physically available and evaluated Jonah Jaed I managed the patient along with the ED Attending      Electronically Signed by  Elmer Geller MD  12/13/22 3765

## 2022-12-15 ENCOUNTER — HOSPITAL ENCOUNTER (EMERGENCY)
Facility: HOSPITAL | Age: 42
Discharge: HOME | End: 2022-12-15
Attending: EMERGENCY MEDICINE

## 2022-12-15 VITALS
SYSTOLIC BLOOD PRESSURE: 118 MMHG | OXYGEN SATURATION: 98 % | TEMPERATURE: 99 F | HEART RATE: 70 BPM | RESPIRATION RATE: 20 BRPM | DIASTOLIC BLOOD PRESSURE: 70 MMHG

## 2022-12-15 DIAGNOSIS — L02.91 ABSCESS: Primary | ICD-10-CM

## 2022-12-15 RX ORDER — CEPHALEXIN 500 MG/1
500 CAPSULE ORAL EVERY 12 HOURS SCHEDULED
Qty: 14 CAPSULE | Refills: 0 | Status: SHIPPED | OUTPATIENT
Start: 2022-12-15 | End: 2022-12-22

## 2022-12-15 RX ORDER — CEPHALEXIN 250 MG/1
500 CAPSULE ORAL ONCE
Status: COMPLETED | OUTPATIENT
Start: 2022-12-15 | End: 2022-12-15

## 2022-12-15 RX ADMIN — CEPHALEXIN 500 MG: 250 CAPSULE ORAL at 11:39

## 2022-12-15 NOTE — ED PROVIDER NOTES
History  Chief Complaint   Patient presents with   • Abscess     Abscess on right buttocks  Was drained 3 days ago  Woke up today with entire dressing soaked with blood and pus  Was not able to get into wound care until Wednesday and PCP wont see her     Patient is a 40-year-old female with history of recent abscess incision and drainage in the emergency department 2 days ago presents for wound check  Patient states she attempted to schedule appointment with wound clinic and they would not see her until next week  Patient denies any worsening pain  No nausea or vomiting  No fevers or chills  Abscess  Associated symptoms: no anorexia, no fatigue, no fever and no vomiting        Prior to Admission Medications   Prescriptions Last Dose Informant Patient Reported?  Taking?   acetaminophen (TYLENOL) 650 mg CR tablet   No No   Sig: Take 1 tablet (650 mg total) by mouth every 8 (eight) hours as needed for mild pain   escitalopram (LEXAPRO) 5 mg tablet   Yes No   Sig: Take 5 mg by mouth daily at bedtime    ibuprofen (MOTRIN) 600 mg tablet   No No   Sig: Take 1 tablet (600 mg total) by mouth every 6 (six) hours as needed for mild pain   ondansetron (ZOFRAN-ODT) 4 mg disintegrating tablet   No No   Sig: Take 1 tablet (4 mg total) by mouth every 8 (eight) hours as needed for nausea or vomiting   Patient not taking: Reported on 2022   sulfamethoxazole-trimethoprim (BACTRIM DS) 800-160 mg per tablet   No No   Sig: Take 1 tablet by mouth 2 (two) times a day for 7 days smx-tmp DS (BACTRIM) 800-160 mg tabs (1tab q12 D10)      Facility-Administered Medications: None       Past Medical History:   Diagnosis Date   • A-fib ([facility] [address] )     no recurrence since  or so   • Menorrhagia    • Vitamin D deficiency        Past Surgical History:   Procedure Laterality Date   •  SECTION     • [place] HYSTEROSCOPY,W/ENDO BX N/A 2021    Procedure: DILATATION AND CURETTAGE (D&C) WITH HYSTEROSCOPY;  Surgeon: Juma Díaz MD; [facility];  Service: Gynecology   • OK HYSTEROSCOPY,W/ENDOMETRIAL ABLATION N/A 9/28/2021    Procedure: ABLATION ENDOMETRIAL VIVIANA;  Surgeon: Gerardo Belle MD;  [facility];  Service: Gynecology   • OK RECONSTRUCT RADIAL HEAD W IMPLANT Right 5/23/2022    Procedure: ARTHROPLASTY RADIAL HEAD;  Surgeon: Hannah Rios;  Location: [facility];  Service: Orthopedics   • OK THYROID LOBECTOMY,UNILAT Right 6/21/2017    Procedure: THYROID LOBECTOMY WITH RECURRENT LARYNGEAL NERVE MONITORING (IMPULSE MONITORING); Surgeon: Oneal Ball MD;  Location[facility];  Service: ENT       Family History   Problem Relation Age of Onset   • Thyroid disease Mother    • Breast cancer Maternal Grandmother    • Breast cancer Maternal Aunt      I have reviewed and agree with the history as documented  E-Cigarette/Vaping   • E-Cigarette Use Never User      E-Cigarette/Vaping Substances   • Nicotine No    • THC No    • CBD No    • Flavoring No    • Other No    • Unknown No      Social History     Tobacco Use   • Smoking status: Never   • Smokeless tobacco: Never   Vaping Use   • Vaping Use: Never used   Substance Use Topics   • Alcohol use: Yes     Comment: 4 drinks per week   • Drug use: No       Review of Systems   Constitutional: Negative for activity change, fatigue and fever  HENT: Negative for congestion, drooling, ear pain and hearing loss  Eyes: Negative for photophobia, pain, discharge and itching  Respiratory: Negative for apnea, cough, wheezing and stridor  Cardiovascular: Negative for chest pain  Gastrointestinal: Negative for abdominal distention, anorexia, constipation, diarrhea and vomiting  Endocrine: Negative for cold intolerance and polydipsia  Genitourinary: Negative for difficulty urinating, dyspareunia and dysuria  Musculoskeletal: Negative for arthralgias  Skin: Negative for color change  Allergic/Immunologic: Negative for environmental allergies     Neurological: Negative for dizziness  Hematological: Negative for adenopathy  Psychiatric/Behavioral: Negative for agitation  Physical Exam  Physical Exam  Constitutional:       Appearance: Normal appearance  HENT:      Head: Normocephalic and atraumatic  Nose: Nose normal       Mouth/Throat:      Mouth: Mucous membranes are moist    Eyes:      Extraocular Movements: Extraocular movements intact  Pupils: Pupils are equal, round, and reactive to light  Cardiovascular:      Rate and Rhythm: Normal rate  Pulses: Normal pulses  Pulmonary:      Effort: Pulmonary effort is normal    Abdominal:      Palpations: Abdomen is soft  Musculoskeletal:         General: Normal range of motion  Cervical back: Normal range of motion  Skin:     General: Skin is warm  Capillary Refill: Capillary refill takes less than 2 seconds  Comments: Buttock area contains small area of incision  Appears to be healing well  Packing removed  Added Keflex for additional coverage  Patient will continue taking Bactrim and Keflex and will follow up with wound clinic on Wednesday  Neurological:      General: No focal deficit present  Mental Status: She is alert  Vital Signs  ED Triage Vitals [12/15/22 0955]   Temperature Pulse Respirations Blood Pressure SpO2   99 °F (37 2 °C) 70 20 118/70 98 %      Temp src Heart Rate Source Patient Position - Orthostatic VS BP Location FiO2 (%)   -- Monitor Sitting Right arm --      Pain Score       --           Vitals:    12/15/22 0955   BP: 118/70   Pulse: 70   Patient Position - Orthostatic VS: Sitting         Visual Acuity      ED Medications  Medications   cephalexin (KEFLEX) capsule 500 mg (has no administration in time range)       Diagnostic Studies  Results Reviewed     None                 No orders to display              Procedures  Procedures         ED Course                Buttock area contains small area of incision  Appears to be healing well    Packing removed  Added Keflex for additional coverage  Patient will continue taking Bactrim and Keflex and will follow up with wound clinic on Wednesday  [facility]  Number of Diagnoses or Management Options  Abscess: new and requires workup  Risk of Complications, Morbidity, and/or Mortality  Presenting problems: moderate  Diagnostic procedures: moderate  Management options: moderate        Disposition  Final diagnoses:   Abscess     Time reflects when diagnosis was documented in both MDM as applicable and the Disposition within this note     Time User Action Codes Description Comment    12/15/2022 11:37 AM Jovi Hogan Add [L02 91] Abscess       ED Disposition     ED Disposition   Discharge    Condition   Stable    Date/Time   Thu Dec 15, 2022 11:37 AM    Comment   Liliana Alexander discharge to home/self care  Follow-up Information     Follow up With Specialties Details Why Red Ayala 51, DO Family Medicine Schedule an appointment as soon as possible for a visit in 1 day  [address] [place] [address]  [phone]            Patient's Medications   Discharge Prescriptions    CEPHALEXIN (KEFLEX) 500 MG CAPSULE    Take 1 capsule (500 mg total) by mouth every 12 (twelve) hours for 7 days       Start Date: 12/15/2022End Date: 12/22/2022       Order Dose: 500 mg       Quantity: 14 capsule    Refills: 0       No discharge procedures on file      PDMP Review     None          ED Provider  Electronically Signed by           Nanda Hannah DO  12/15/22 8610

## 2022-12-17 ENCOUNTER — APPOINTMENT (EMERGENCY)
Dept: CT IMAGING | Facility: HOSPITAL | Age: 42
End: 2022-12-17

## 2022-12-17 ENCOUNTER — HOSPITAL ENCOUNTER (EMERGENCY)
Facility: HOSPITAL | Age: 42
Discharge: HOME | End: 2022-12-17
Attending: EMERGENCY MEDICINE

## 2022-12-17 VITALS
SYSTOLIC BLOOD PRESSURE: 114 MMHG | HEART RATE: 66 BPM | OXYGEN SATURATION: 100 % | HEIGHT: 66 IN | RESPIRATION RATE: 16 BRPM | DIASTOLIC BLOOD PRESSURE: 52 MMHG | TEMPERATURE: 98.5 F | BODY MASS INDEX: 24.11 KG/M2 | WEIGHT: 150 LBS

## 2022-12-17 DIAGNOSIS — N20.0 KIDNEY STONE: ICD-10-CM

## 2022-12-17 DIAGNOSIS — Z51.89 WOUND CHECK, ABSCESS: Primary | ICD-10-CM

## 2022-12-17 LAB
ALBUMIN SERPL BCP-MCNC: 4.1 G/DL (ref 3.5–5)
ALP SERPL-CCNC: 51 U/L (ref 34–104)
ALT SERPL W P-5'-P-CCNC: 8 U/L (ref 7–52)
ANION GAP SERPL CALCULATED.3IONS-SCNC: 7 MMOL/L (ref 4–13)
AST SERPL W P-5'-P-CCNC: 14 U/L (ref 13–39)
BASOPHILS # BLD AUTO: 0.09 THOUSANDS/ÂΜL (ref 0–0.1)
BASOPHILS NFR BLD AUTO: 1 % (ref 0–1)
BILIRUB SERPL-MCNC: 0.19 MG/DL (ref 0.2–1)
BILIRUB UR QL STRIP: NEGATIVE
BUN SERPL-MCNC: 10 MG/DL (ref 5–25)
CALCIUM SERPL-MCNC: 9.3 MG/DL (ref 8.4–10.2)
CHLORIDE SERPL-SCNC: 103 MMOL/L (ref 96–108)
CLARITY UR: CLEAR
CO2 SERPL-SCNC: 26 MMOL/L (ref 21–32)
COLOR UR: COLORLESS
CREAT SERPL-MCNC: 0.76 MG/DL (ref 0.6–1.3)
EOSINOPHIL # BLD AUTO: 0.2 THOUSAND/ÂΜL (ref 0–0.61)
EOSINOPHIL NFR BLD AUTO: 3 % (ref 0–6)
ERYTHROCYTE [DISTWIDTH] IN BLOOD BY AUTOMATED COUNT: 12.8 % (ref 11.6–15.1)
GFR SERPL CREATININE-BSD FRML MDRD: 95 ML/MIN/1.73SQ M
GLUCOSE SERPL-MCNC: 76 MG/DL (ref 65–140)
GLUCOSE UR STRIP-MCNC: NEGATIVE MG/DL
HCT VFR BLD AUTO: 40.4 % (ref 34.8–46.1)
HGB BLD-MCNC: 13.6 G/DL (ref 11.5–15.4)
HGB UR QL STRIP.AUTO: NEGATIVE
IMM GRANULOCYTES # BLD AUTO: 0.02 THOUSAND/UL (ref 0–0.2)
IMM GRANULOCYTES NFR BLD AUTO: 0 % (ref 0–2)
KETONES UR STRIP-MCNC: NEGATIVE MG/DL
LEUKOCYTE ESTERASE UR QL STRIP: NEGATIVE
LYMPHOCYTES # BLD AUTO: 2.36 THOUSANDS/ÂΜL (ref 0.6–4.47)
LYMPHOCYTES NFR BLD AUTO: 32 % (ref 14–44)
MCH RBC QN AUTO: 30.6 PG (ref 26.8–34.3)
MCHC RBC AUTO-ENTMCNC: 33.7 G/DL (ref 31.4–37.4)
MCV RBC AUTO: 91 FL (ref 82–98)
MONOCYTES # BLD AUTO: 0.79 THOUSAND/ÂΜL (ref 0.17–1.22)
MONOCYTES NFR BLD AUTO: 11 % (ref 4–12)
NEUTROPHILS # BLD AUTO: 3.97 THOUSANDS/ÂΜL (ref 1.85–7.62)
NEUTS SEG NFR BLD AUTO: 53 % (ref 43–75)
NITRITE UR QL STRIP: NEGATIVE
NRBC BLD AUTO-RTO: 0 /100 WBCS
PH UR STRIP.AUTO: 6.5 [PH]
PLATELET # BLD AUTO: 346 THOUSANDS/UL (ref 149–390)
PMV BLD AUTO: 9.9 FL (ref 8.9–12.7)
POTASSIUM SERPL-SCNC: 4.4 MMOL/L (ref 3.5–5.3)
PROT SERPL-MCNC: 7.3 G/DL (ref 6.4–8.4)
PROT UR STRIP-MCNC: NEGATIVE MG/DL
RBC # BLD AUTO: 4.45 MILLION/UL (ref 3.81–5.12)
SODIUM SERPL-SCNC: 136 MMOL/L (ref 135–147)
SP GR UR STRIP.AUTO: 1.05 (ref 1–1.03)
UROBILINOGEN UR STRIP-ACNC: <2 MG/DL
WBC # BLD AUTO: 7.43 THOUSAND/UL (ref 4.31–10.16)

## 2022-12-17 RX ORDER — TAMSULOSIN HYDROCHLORIDE 0.4 MG/1
0.4 CAPSULE ORAL ONCE
Status: COMPLETED | OUTPATIENT
Start: 2022-12-17 | End: 2022-12-17

## 2022-12-17 RX ORDER — MORPHINE SULFATE 4 MG/ML
4 INJECTION, SOLUTION INTRAMUSCULAR; INTRAVENOUS ONCE
Status: COMPLETED | OUTPATIENT
Start: 2022-12-17 | End: 2022-12-17

## 2022-12-17 RX ORDER — ONDANSETRON 2 MG/ML
4 INJECTION INTRAMUSCULAR; INTRAVENOUS ONCE
Status: COMPLETED | OUTPATIENT
Start: 2022-12-17 | End: 2022-12-17

## 2022-12-17 RX ORDER — ONDANSETRON 4 MG/1
4 TABLET, FILM COATED ORAL EVERY 8 HOURS PRN
Qty: 21 TABLET | Refills: 0 | Status: SHIPPED | OUTPATIENT
Start: 2022-12-17 | End: 2022-12-24

## 2022-12-17 RX ORDER — TAMSULOSIN HYDROCHLORIDE 0.4 MG/1
0.4 CAPSULE ORAL
Qty: 7 CAPSULE | Refills: 0 | Status: SHIPPED | OUTPATIENT
Start: 2022-12-17 | End: 2022-12-24

## 2022-12-17 RX ADMIN — MORPHINE SULFATE 4 MG: 4 INJECTION INTRAVENOUS at 16:35

## 2022-12-17 RX ADMIN — ONDANSETRON 4 MG: 2 INJECTION INTRAMUSCULAR; INTRAVENOUS at 16:35

## 2022-12-17 RX ADMIN — TAMSULOSIN HYDROCHLORIDE 0.4 MG: 0.4 CAPSULE ORAL at 18:48

## 2022-12-17 RX ADMIN — IOHEXOL 85 ML: 350 INJECTION, SOLUTION INTRAVENOUS at 17:33

## 2022-12-17 NOTE — ED ATTENDING ATTESTATION
12/17/2022  INicolette MD, saw and evaluated the patient  I have discussed the patient with the resident/non-physician practitioner and agree with the resident's/non-physician practitioner's findings, Plan of Care, and MDM as documented in the resident's/non-physician practitioner's note, except where noted  All available labs and Radiology studies were reviewed  I was present for key portions of any procedure(s) performed by the resident/non-physician practitioner and I was immediately available to provide assistance  At this point I agree with the current assessment done in the Emergency Department  I have conducted an independent evaluation of this patient a history and physical is as follows:  Patient presents for evaluation of persistent pain and drainage at site of abscess drainage  Has wound care follow-up this week  No fever/chills  Review of Systems - Negative except buttocks wound  Physical Exam  Vitals and nursing note reviewed  Constitutional:       General: She is not in acute distress  Appearance: She is well-developed  HENT:      Head: Normocephalic and atraumatic  Eyes:      Pupils: Pupils are equal, round, and reactive to light  Cardiovascular:      Rate and Rhythm: Normal rate and regular rhythm  Heart sounds: Normal heart sounds  No murmur heard  Pulmonary:      Effort: Pulmonary effort is normal  No respiratory distress  Breath sounds: Normal breath sounds  No wheezing or rales  Abdominal:      General: Bowel sounds are normal  There is no distension  Palpations: Abdomen is soft  Tenderness: There is no abdominal tenderness  There is no guarding or rebound  Musculoskeletal:         General: No deformity  Normal range of motion  Cervical back: Normal range of motion and neck supple  Lymphadenopathy:      Cervical: No cervical adenopathy  Skin:     Capillary Refill: Capillary refill takes less than 2 seconds  Findings: No erythema or rash  Comments: Open wound right buttocks, mild surrounding redness  Neurological:      Mental Status: She is alert and oriented to person, place, and time  Cranial Nerves: No cranial nerve deficit  Motor: No abnormal muscle tone        Coordination: Coordination normal    Psychiatric:         Behavior: Behavior normal        Results Reviewed     Procedure Component Value Units Date/Time    UA w Reflex to Microscopic w Reflex to Culture [[id number]]     Lab Status: No result Specimen: Urine     Comprehensive metabolic panel [[id number]]  (Abnormal) Collected: 12/17/22 1635    Lab Status: Final result Specimen: Blood from Arm, Right Updated: 12/17/22 1705     Sodium 136 mmol/L      Potassium 4 4 mmol/L      Chloride 103 mmol/L      CO2 26 mmol/L      ANION GAP 7 mmol/L      BUN 10 mg/dL      Creatinine 0 76 mg/dL      Glucose 76 mg/dL      Calcium 9 3 mg/dL      AST 14 U/L      ALT 8 U/L      Alkaline Phosphatase 51 U/L      Total Protein 7 3 g/dL      Albumin 4 1 g/dL      Total Bilirubin 0 19 mg/dL      eGFR 95 ml/min/1 73sq m     Narrative:      [place] guidelines for Chronic Kidney Disease (CKD):   •  Stage 1 with normal or high GFR (GFR > 90 mL/min/1 73 square meters)  •  Stage 2 Mild CKD (GFR = 60-89 mL/min/1 73 square meters)  •  Stage 3A Moderate CKD (GFR = 45-59 mL/min/1 73 square meters)  •  Stage 3B Moderate CKD (GFR = 30-44 mL/min/1 73 square meters)  •  Stage 4 Severe CKD (GFR = 15-29 mL/min/1 73 square meters)  •  Stage 5 End Stage CKD (GFR <15 mL/min/1 73 square meters)  Note: GFR calculation is accurate only with a steady state creatinine    CBC and differential [[id number]] Collected: 12/17/22 1635    Lab Status: Final result Specimen: Blood from Arm, Right Updated: 12/17/22 1644     WBC 7 43 Thousand/uL      RBC 4 45 Million/uL      Hemoglobin 13 6 g/dL      Hematocrit 40 4 %      MCV 91 fL      MCH 30 6 pg      MCHC 33 7 g/dL RDW 12 8 %      MPV 9 9 fL      Platelets 164 Thousands/uL      nRBC 0 /100 WBCs      Neutrophils Relative 53 %      Immat GRANS % 0 %      Lymphocytes Relative 32 %      Monocytes Relative 11 %      Eosinophils Relative 3 %      Basophils Relative 1 %      Neutrophils Absolute 3 97 Thousands/µL      Immature Grans Absolute 0 02 Thousand/uL      Lymphocytes Absolute 2 36 Thousands/µL      Monocytes Absolute 0 79 Thousand/µL      Eosinophils Absolute 0 20 Thousand/µL      Basophils Absolute 0 09 Thousands/µL         CT abdomen pelvis with contrast   Final Result by Javi Pugh MD (12/17 1818)      1  Small sinus tract in the right gluteal skin with adjacent inflammatory changes  No localized fluid collection  2   Moderate right-side hydroureteronephrosis secondary to a 9 mm right proximal ureteral calculus  3   Uncomplicated pancreatic divisum  The study was marked in [facility] for immediate notification  Workstation performed: [id number]           Incidental 9 mm right stone  Appreciate urology helping to arrange outpatient follow-up  Patient asymptomatic from this standpoint  CT with no residual abscess  Local wound care, topical abx  Patient finished course of systemic abx  No systemic signs/symptoms at this time  Patient has wound care and urology follow-up as an outpatient  Return precautions discussed        ED Course         Critical Care Time  Procedures

## 2022-12-17 NOTE — ED PROVIDER NOTES
History  Chief Complaint   Patient presents with   • Wound Check     Patient reports abscess to buttock - ongoing x2wks  Prescribed x2 antibiotics and taking as prescribed - currently on Keflex and bactrim  Reports wound is growing in size and now draining fluids  37yo F presenting for wound check  2 weeks ago, she noticed a developing perianal abscess  She was advised by her PCP to start a course of keflex and bactrim, and to put salve on it  A few days later, her abscess was worsening, for which she went to our ED  She had the abscess drained and continued her course of abx  Last Saturday she had an episode of fever  Throughout the week she noticed her abscess hole was getting wider and draining more despite proper wound care and abx use  Today she finished her course of abx, however, given how painful it has become and now it has purplish discoloration, she came in to see us  History provided by:  Patient      Prior to Admission Medications   Prescriptions Last Dose Informant Patient Reported?  Taking?   acetaminophen (TYLENOL) 650 mg CR tablet   No No   Sig: Take 1 tablet (650 mg total) by mouth every 8 (eight) hours as needed for mild pain   cephalexin (KEFLEX) 500 mg capsule   No No   Sig: Take 1 capsule (500 mg total) by mouth every 12 (twelve) hours for 7 days   escitalopram (LEXAPRO) 5 mg tablet   Yes No   Sig: Take 5 mg by mouth daily at bedtime    ibuprofen (MOTRIN) 600 mg tablet   No No   Sig: Take 1 tablet (600 mg total) by mouth every 6 (six) hours as needed for mild pain   ondansetron (ZOFRAN-ODT) 4 mg disintegrating tablet   No No   Sig: Take 1 tablet (4 mg total) by mouth every 8 (eight) hours as needed for nausea or vomiting   Patient not taking: Reported on 9/22/2022   sulfamethoxazole-trimethoprim (BACTRIM DS) 800-160 mg per tablet   No No   Sig: Take 1 tablet by mouth 2 (two) times a day for 7 days smx-tmp DS (BACTRIM) 800-160 mg tabs (1tab q12 D10)      Facility-Administered Medications: None       Past Medical History:   Diagnosis Date   • A-fib ([address] )     no recurrence since  or so   • Menorrhagia    • Vitamin D deficiency        Past Surgical History:   Procedure Laterality Date   •  SECTION     • [place] HYSTEROSCOPY,W/ENDO BX N/A 2021    Procedure: DILATATION AND CURETTAGE (D&C) WITH HYSTEROSCOPY;  Surgeon: David East MD;  [facility];  Service: Gynecology   • ND HYSTEROSCOPY,W/ENDOMETRIAL ABLATION N/A 2021    Procedure: ABLATION ENDOMETRIAL VIVIANA;  Surgeon: David East MD;  Location: [facility];  Service: Gynecology   • ND RECONSTRUCT RADIAL HEAD W IMPLANT Right 2022    Procedure: ARTHROPLASTY RADIAL HEAD;  Surgeon: Seda Jimenez;  Location: [facility];  Service: Orthopedics   • ND THYROID LOBECTOMY,UNILAT Right 2017    Procedure: THYROID LOBECTOMY WITH RECURRENT LARYNGEAL NERVE MONITORING (IMPULSE MONITORING); Surgeon: Josiah Lujan MD;  [facility];  Service: ENT       Family History   Problem Relation Age of Onset   • Thyroid disease Mother    • Breast cancer Maternal Grandmother    • Breast cancer Maternal Aunt      I have reviewed and agree with the history as documented  E-Cigarette/Vaping   • E-Cigarette Use Never User      E-Cigarette/Vaping Substances   • Nicotine No    • THC No    • CBD No    • Flavoring No    • Other No    • Unknown No      Social History     Tobacco Use   • Smoking status: Never   • Smokeless tobacco: Never   Vaping Use   • Vaping Use: Never used   Substance Use Topics   • Alcohol use: Yes     Comment: 4 drinks per week   • Drug use: No        Review of Systems   Constitutional: Positive for fever  HENT: Negative  Respiratory: Negative  Cardiovascular: Negative  Gastrointestinal: Negative  Genitourinary: Negative  Musculoskeletal: Negative  Skin: Positive for color change, rash and wound  Neurological: Negative  Psychiatric/Behavioral: Negative          Physical Exam  ED Triage Vitals [12/17/22 1524]   Temperature Pulse Respirations Blood Pressure SpO2   98 5 °F (36 9 °C) 80 16 113/65 98 %      Temp Source Heart Rate Source Patient Position - Orthostatic VS BP Location FiO2 (%)   Oral Monitor Sitting Right arm --      Pain Score       6             Orthostatic Vital Signs  Vitals:    12/17/22 1524 12/17/22 1838   BP: 113/65 114/52   Pulse: 80 66   Patient Position - Orthostatic VS: Sitting        Physical Exam  Constitutional:       General: She is not in acute distress  Appearance: Normal appearance  HENT:      Head: Normocephalic and atraumatic  Right Ear: External ear normal       Left Ear: External ear normal       Nose: Nose normal  No rhinorrhea  Mouth/Throat:      Mouth: Mucous membranes are moist       Pharynx: Oropharynx is clear  Eyes:      Extraocular Movements: Extraocular movements intact  Conjunctiva/sclera: Conjunctivae normal    Cardiovascular:      Rate and Rhythm: Normal rate and regular rhythm  Pulses: Normal pulses  Heart sounds: Normal heart sounds  Pulmonary:      Effort: Pulmonary effort is normal       Breath sounds: Normal breath sounds  Abdominal:      General: Abdomen is flat  Palpations: Abdomen is soft  Skin:     Capillary Refill: Capillary refill takes less than 2 seconds  Comments: Tender quarter-sized open and draining abscess on the right buttock, surrounded by erythema and ecchymosis  Neurological:      General: No focal deficit present  Mental Status: She is alert and oriented to person, place, and time  Sensory: No sensory deficit     Psychiatric:         Mood and Affect: Mood normal          Behavior: Behavior normal          ED Medications  Medications   tamsulosin (FLOMAX) capsule 0 4 mg (has no administration in time range)   ondansetron (ZOFRAN) injection 4 mg (4 mg Intravenous Given 12/17/22 1635)   morphine injection 4 mg (4 mg Intravenous Given 12/17/22 1635)   iohexol (OMNIPAQUE) 350 MG/ML injection (SINGLE-DOSE) 85 mL (85 mL Intravenous Given 12/17/22 8383)       Diagnostic Studies  Results Reviewed     Procedure Component Value Units Date/Time    UA w Reflex to Microscopic w Reflex to Culture [[id number]]     Lab Status: No result Specimen: Urine     Comprehensive metabolic panel [[id number]]  (Abnormal) Collected: 12/17/22 1635    Lab Status: Final result Specimen: Blood from Arm, Right Updated: 12/17/22 1705     Sodium 136 mmol/L      Potassium 4 4 mmol/L      Chloride 103 mmol/L      CO2 26 mmol/L      ANION GAP 7 mmol/L      BUN 10 mg/dL      Creatinine 0 76 mg/dL      Glucose 76 mg/dL      Calcium 9 3 mg/dL      AST 14 U/L      ALT 8 U/L      Alkaline Phosphatase 51 U/L      Total Protein 7 3 g/dL      Albumin 4 1 g/dL      Total Bilirubin 0 19 mg/dL      eGFR 95 ml/min/1 73sq m     Narrative:      [employer] guidelines for Chronic Kidney Disease (CKD):   •  Stage 1 with normal or high GFR (GFR > 90 mL/min/1 73 square meters)  •  Stage 2 Mild CKD (GFR = 60-89 mL/min/1 73 square meters)  •  Stage 3A Moderate CKD (GFR = 45-59 mL/min/1 73 square meters)  •  Stage 3B Moderate CKD (GFR = 30-44 mL/min/1 73 square meters)  •  Stage 4 Severe CKD (GFR = 15-29 mL/min/1 73 square meters)  •  Stage 5 End Stage CKD (GFR <15 mL/min/1 73 square meters)  Note: GFR calculation is accurate only with a steady state creatinine    CBC and differential [[id number]] Collected: 12/17/22 1635    Lab Status: Final result Specimen: Blood from Arm, Right Updated: 12/17/22 1644     WBC 7 43 Thousand/uL      RBC 4 45 Million/uL      Hemoglobin 13 6 g/dL      Hematocrit 40 4 %      MCV 91 fL      MCH 30 6 pg      MCHC 33 7 g/dL      RDW 12 8 %      MPV 9 9 fL      Platelets 158 Thousands/uL      nRBC 0 /100 WBCs      Neutrophils Relative 53 %      Immat GRANS % 0 %      Lymphocytes Relative 32 %      Monocytes Relative 11 %      Eosinophils Relative 3 %      Basophils Relative 1 % Neutrophils Absolute 3 97 Thousands/µL      Immature Grans Absolute 0 02 Thousand/uL      Lymphocytes Absolute 2 36 Thousands/µL      Monocytes Absolute 0 79 Thousand/µL      Eosinophils Absolute 0 20 Thousand/µL      Basophils Absolute 0 09 Thousands/µL                  CT abdomen pelvis with contrast   Final Result by Elin Glover MD (12/17 1818)      1  Small sinus tract in the right gluteal skin with adjacent inflammatory changes  No localized fluid collection  2   Moderate right-side hydroureteronephrosis secondary to a 9 mm right proximal ureteral calculus  3   Uncomplicated pancreatic divisum  The study was marked in [facility] for immediate notification  Workstation performed: [id number]               Procedures  Procedures      ED Course  ED Course as of 12/17/22 1848   Sat Dec 17, 2022   1700 Ddx includes but not limited to perianal abscess, cellulitis, nec fasc, myositis, rectal abscess  1708 Pt's pain resolved with morphine  1827 CT w/o evidence of deep tissue infection  Advised Pt to continue local wound care and follow up with scheduled wound clinic appointment  1840 CT incidentally found a 9mm kidney stone  Urology consulted who will flag the Pt for f/u  Recommended flomax  1847 Discussed incidental findings with Pt  Advised her to f/u with wound care as scheduled  Advised her to f/u with urology for management of the stone                                         MDM  Number of Diagnoses or Management Options      Disposition  Final diagnoses:   Kidney stone   Wound check, abscess     Time reflects when diagnosis was documented in both MDM as applicable and the Disposition within this note     Time User Action Codes Description Comment    12/17/2022  6:42 PM Ruth  Add [N20 0] Kidney stone     12/17/2022  6:42 PM Ruth  Add [Z51 89] Wound check, abscess     12/17/2022  6:42 PM Ruth  Modify [N20 0] Kidney stone     12/17/2022  6:42 PM Harsha Prudence Alert [Z51 89] Wound check, abscess       ED Disposition     None      Follow-up Information     Follow up With Specialties Details Why Contact Info Additional [address] [facility] Service will contact patient/caregiver with hospital follow-up appointment date and time of discharge  [phone] [facility] 6 [facility], [address] [place], [place], [facility] 6          Patient's Medications   Discharge Prescriptions    ONDANSETRON (ZOFRAN) 4 MG TABLET    Take 1 tablet (4 mg total) by mouth every 8 (eight) hours as needed for nausea or vomiting for up to 7 days       Start Date: 12/17/2022End Date: 12/24/2022       Order Dose: 4 mg       Quantity: 21 tablet    Refills: 0    TAMSULOSIN (FLOMAX) 0 4 MG    Take 1 capsule (0 4 mg total) by mouth daily with dinner for 7 days       Start Date: 12/17/2022End Date: 12/24/2022       Order Dose: 0 4 mg       Quantity: 7 capsule    Refills: 0         PDMP Review     None           ED Provider  Attending physically available and evaluated Marko Mendoza I managed the patient along with the ED Attending      Electronically Signed by         Edwin Joy MD  12/17/22 8652

## 2022-12-17 NOTE — DISCHARGE INSTRUCTIONS
You may apply bacitracin to the wound  Follow up with the wound clinic as scheduled  Return to the ER if you develop:    Rash spreading from the wound, significant pain around the wound, fever/chill, pain on urination, significant flank pain, blood in your urine

## 2022-12-20 ENCOUNTER — PREP FOR PROCEDURE (OUTPATIENT)
Dept: UROLOGY | Facility: CLINIC | Age: 42
End: 2022-12-20

## 2022-12-20 ENCOUNTER — OFFICE VISIT (OUTPATIENT)
Dept: UROLOGY | Facility: CLINIC | Age: 42
End: 2022-12-20

## 2022-12-20 VITALS
SYSTOLIC BLOOD PRESSURE: 116 MMHG | BODY MASS INDEX: 24.11 KG/M2 | HEART RATE: 78 BPM | OXYGEN SATURATION: 98 % | WEIGHT: 150 LBS | DIASTOLIC BLOOD PRESSURE: 78 MMHG | HEIGHT: 66 IN

## 2022-12-20 DIAGNOSIS — N20.1 URETERAL CALCULUS, RIGHT: Primary | ICD-10-CM

## 2022-12-20 DIAGNOSIS — N20.0 KIDNEY STONES: Primary | ICD-10-CM

## 2022-12-20 DIAGNOSIS — R39.89 SUSPECTED UTI: ICD-10-CM

## 2022-12-20 RX ORDER — ESCITALOPRAM OXALATE 20 MG/1
20 TABLET ORAL DAILY
COMMUNITY
Start: 2022-11-17

## 2022-12-20 RX ORDER — ALPRAZOLAM 0.25 MG/1
TABLET ORAL
COMMUNITY
Start: 2022-11-17

## 2022-12-20 RX ORDER — FLUCONAZOLE 150 MG/1
150 TABLET ORAL DAILY
COMMUNITY
Start: 2022-12-08

## 2022-12-20 RX ORDER — SODIUM CHLORIDE, SODIUM LACTATE, POTASSIUM CHLORIDE, CALCIUM CHLORIDE 600; 310; 30; 20 MG/100ML; MG/100ML; MG/100ML; MG/100ML
125 INJECTION, SOLUTION INTRAVENOUS CONTINUOUS
OUTPATIENT
Start: 2022-12-20

## 2022-12-20 RX ORDER — SODIUM CHLORIDE 450 MG/100ML
125 INJECTION, SOLUTION INTRAVENOUS CONTINUOUS
OUTPATIENT
Start: 2022-12-20

## 2022-12-20 NOTE — PROGRESS NOTES
UROLOGY CONSULTATION NOTE     Patient Identifiers: Matti Rey (MRN: [id number])  Service Requesting Consultation: Referral Self  Service Providing Consultation:  Urology, Shady Persaud PA-C  Consults  Date of Service: 2022    Reason for Consultation: Right proximal ureter calculi    History of Present Illness:     Matti Rey is a 38 y o  female with no prior urologic history and no family history of kidney stones  She is in the emergency room with a perirectal abscess  CAT scan had an incidental finding of a proximal right ureter calculus with mild hydronephrosis  She is totally asymptomatic and pain-free  She does not get urinary tract infections  Urine is clear  Past Medical, Past Surgical History:     Past Medical History:   Diagnosis Date   • A-fib ([address] )     no recurrence since  or so   • Menorrhagia    • Vitamin D deficiency    :    Past Surgical History:   Procedure Laterality Date   •  SECTION     • CT HYSTEROSCOPY,W/ENDO BX N/A 2021    Procedure: DILATATION AND CURETTAGE (D&C) WITH HYSTEROSCOPY;  Surgeon: Bianca Black MD;  [facility];  Service: Gynecology   • CT HYSTEROSCOPY,W/ENDOMETRIAL ABLATION N/A 2021    Procedure: ABLATION ENDOMETRIAL VIVIANA;  Surgeon: Bianca Black MD;  [facility];  Service: Gynecology   • CT RECONSTRUCT RADIAL HEAD W IMPLANT Right 2022    Procedure: ARTHROPLASTY RADIAL HEAD;  Surgeon: Demond Carter;  [facility];  Service: Orthopedics   • CT THYROID LOBECTOMY,UNILAT Right 2017    Procedure: THYROID LOBECTOMY WITH RECURRENT LARYNGEAL NERVE MONITORING (IMPULSE MONITORING);   Surgeon: Kunal Maddox MD;  [facility];  Service: ENT   :    Medications, Allergies:     Current Outpatient Medications:   •  acetaminophen (TYLENOL) 650 mg CR tablet, Take 1 tablet (650 mg total) by mouth every 8 (eight) hours as needed for mild pain, Disp: 30 tablet, Rfl: 0  •  ALPRAZolam Magda Glass) 0 25 mg tablet, TAKE 1 TO 2 TABLETS BY MOUTH TWICE A DAY AS NEEDED FOR ANXIETY, Disp: , Rfl:   •  cephalexin (KEFLEX) 500 mg capsule, Take 1 capsule (500 mg total) by mouth every 12 (twelve) hours for 7 days, Disp: 14 capsule, Rfl: 0  •  escitalopram (LEXAPRO) 20 mg tablet, Take 20 mg by mouth daily, Disp: , Rfl:   •  fluconazole (DIFLUCAN) 150 mg tablet, Take 150 mg by mouth daily, Disp: , Rfl:   •  ibuprofen (MOTRIN) 600 mg tablet, Take 1 tablet (600 mg total) by mouth every 6 (six) hours as needed for mild pain, Disp: 30 tablet, Rfl: 0  •  ondansetron (ZOFRAN) 4 mg tablet, Take 1 tablet (4 mg total) by mouth every 8 (eight) hours as needed for nausea or vomiting for up to 7 days, Disp: 21 tablet, Rfl: 0  •  sulfamethoxazole-trimethoprim (BACTRIM DS) 800-160 mg per tablet, Take 1 tablet by mouth 2 (two) times a day for 7 days smx-tmp DS (BACTRIM) 800-160 mg tabs (1tab q12 D10), Disp: 14 tablet, Rfl: 0  •  tamsulosin (FLOMAX) 0 4 mg, Take 1 capsule (0 4 mg total) by mouth daily with dinner for 7 days, Disp: 7 capsule, Rfl: 0  •  ondansetron (ZOFRAN-ODT) 4 mg disintegrating tablet, Take 1 tablet (4 mg total) by mouth every 8 (eight) hours as needed for nausea or vomiting (Patient not taking: Reported on 9/22/2022), Disp: 10 tablet, Rfl: 0    Allergies:  No Known Allergies:    Social and Family History:   Social History:   Social History     Tobacco Use   • Smoking status: Never   • Smokeless tobacco: Never   Vaping Use   • Vaping Use: Never used   Substance Use Topics   • Alcohol use: Yes     Comment: 4 drinks per week   • Drug use: No        Social History     Tobacco Use   Smoking Status Never   Smokeless Tobacco Never       Family History:  Family History   Problem Relation Age of Onset   • Thyroid disease Mother    • Breast cancer Maternal Grandmother    • Breast cancer Maternal Aunt    :     Review of Systems:     General: Fever, chills, or night sweats: negative  Cardiac: Negative for chest pain      Pulmonary: Negative for shortness of breath  Gastrointestinal: Abdominal pain negative  Nausea, vomiting, or diarrhea negative,  Genitourinary: See HPI above  Patient does not have hematuria  All other systems queried were negative  Physical Exam:   General: Patient is pleasant and in NAD  Awake and alert  /78 (BP Location: Left arm, Patient Position: Sitting, Cuff Size: Adult)   Pulse 78   Ht 5' 6" (1 676 m)   Wt 68 kg (150 lb)   SpO2 98%   BMI 24 21 kg/m²   HEENT:  Conjunctiva are clear  Constitutional:  pleasant and cooperative     no apparent distress  Cardiac: Peripheral edema: negative  Pulmonary: Non-labored breathing  Abdomen: Soft, non-tender, non-distended  No surgical scars  No masses, tenderness, hernias noted  Genitourinary: Negative CVA tenderness, negative suprapubic tenderness  Extremities:  Moves all extremities  Neurological:CNII-XII intact  No numbness or tingling  Essentially non focal neurologic exam  Psychiatric:mood affect and behavior normal        Labs:     Lab Results   Component Value Date    HGB 13 6 12/17/2022    HCT 40 4 12/17/2022    WBC 7 43 12/17/2022     12/17/2022   ]    Lab Results   Component Value Date     05/19/2017    K 4 4 12/17/2022     12/17/2022    CO2 26 12/17/2022    BUN 10 12/17/2022    CREATININE 0 76 12/17/2022    CALCIUM 9 3 12/17/2022    GLUCOSE 104 11/09/2014   ]    Imaging:   I personally reviewed the images and report of the following studies, and reviewed them with the patient:  CT ABDOMEN AND PELVIS WITH IV CONTRAST      IMPRESSION:     1  Small sinus tract in the right gluteal skin with adjacent inflammatory changes  No localized fluid collection  2   Moderate right-side hydroureteronephrosis secondary to a 9 mm right proximal ureteral calculus  3   Uncomplicated pancreatic divisum           ASSESSMENT:   #1   9 mm proximal right ureteral calculus with mild hydronephrosis      PLAN:   -Options of management with the patient  -Consent signed for ureteroscopy  -Case requested and  notified  -She understands there is a small chance that a stent could be placed and she would need a second procedure for stone removal      Thank you for allowing me to participate in this patients’ care  Please do not hesitate to call with any additional questions    Mariam Persaud PA-C

## 2022-12-21 ENCOUNTER — OFFICE VISIT (OUTPATIENT)
Dept: WOUND CARE | Facility: CLINIC | Age: 42
End: 2022-12-21

## 2022-12-21 VITALS
HEART RATE: 60 BPM | RESPIRATION RATE: 14 BRPM | DIASTOLIC BLOOD PRESSURE: 78 MMHG | SYSTOLIC BLOOD PRESSURE: 112 MMHG | TEMPERATURE: 98.1 F

## 2022-12-21 DIAGNOSIS — L02.31 ABSCESS OF BUTTOCK, RIGHT: Primary | ICD-10-CM

## 2022-12-21 RX ORDER — LIDOCAINE 40 MG/G
CREAM TOPICAL ONCE
Status: COMPLETED | OUTPATIENT
Start: 2022-12-21 | End: 2022-12-21

## 2022-12-21 RX ADMIN — LIDOCAINE: 40 CREAM TOPICAL at 09:56

## 2022-12-21 NOTE — PROGRESS NOTES
Patient ID: Honorio Case is a 38 y o  female Date of Birth 1980       Chief Complaint   Patient presents with   • New Patient Visit     Right buttock wound, two weeks, bacitracin       Allergies:  Patient has no known allergies  Diagnosis:      Diagnosis ICD-10-CM Associated Orders   1  Abscess of buttock, right  L02 31 lidocaine (LMX) 4 % cream     Wound cleansing and dressings              Assessment & Plan:  Status post I&D of abscess of the right buttock  There is no depth to the wound at this time but there is surrounding mild erythema and induration  CT scan in the ER did not show any recurrent collection  Continue with bacitracin and dry dressing daily  Sitz bath's multiple times a day if possible  Follow-up in 1 week  Subjective:   12/21/2022: First visit for this 78-year-old female referred to the wound center status post I&D of abscess of the right buttock  Patient developed an abscess over the last 2 weeks or so and was seen in the emergency room where incision and drainage was performed on 12/13/2022  She was treated empirically with Keflex and Bactrim DS  She only has 1 or 2 days more of Keflex  She has been back to the emergency room twice for follow-up  The last time was on the 17th where a CT scan was done which did not show any recurrent collection  She has been using bacitracin ointment and dry dressing  Past medical history is noncontributory        The following portions of the patient's history were reviewed and updated as appropriate:   Patient Active Problem List   Diagnosis   • Other chest pain   • Paroxysmal atrial fibrillation (HCC)   • Abnormal weight gain   • Feeling tired   • Mild vitamin D deficiency   • Right knee pain   • Thyroid nodule   • Menorrhagia with regular cycle   • Vaginal discharge   • Encounter for insertion of mirena IUD   • Bacterial vaginosis   • S/P endometrial ablation     Past Medical History:   Diagnosis Date   • A-fib ([address] )     no recurrence since  or so   • Menorrhagia    • Vitamin D deficiency      Past Surgical History:   Procedure Laterality Date   •  SECTION     • TN HYSTEROSCOPY,W/ENDO BX N/A 2021    Procedure: DILATATION AND CURETTAGE (D&C) WITH HYSTEROSCOPY;  Surgeon: Juancho Arriaga MD;  [facility];  Service: Gynecology   • TN HYSTEROSCOPY,W/ENDOMETRIAL ABLATION N/A 2021    Procedure: ABLATION ENDOMETRIAL VIVIANA;  Surgeon: Juancho Arriaga MD;  [facility];  Service: Gynecology   • TN RECONSTRUCT RADIAL HEAD W IMPLANT Right 2022    Procedure: ARTHROPLASTY RADIAL HEAD;  Surgeon: Rodrigue Bolaños;  [facility];  Service: Orthopedics   • TN THYROID LOBECTOMY,UNILAT Right 2017    Procedure: THYROID LOBECTOMY WITH RECURRENT LARYNGEAL NERVE MONITORING (IMPULSE MONITORING);   Surgeon: Paulina Chino MD;  [facility];  Service: ENT     Family History   Problem Relation Age of Onset   • Thyroid disease Mother    • Breast cancer Maternal Grandmother    • Breast cancer Maternal Aunt       Social History     Socioeconomic History   • Marital status: /Civil Union     Spouse name: None   • Number of children: None   • Years of education: None   • Highest education level: None   Occupational History   • None   Tobacco Use   • Smoking status: Never   • Smokeless tobacco: Never   Vaping Use   • Vaping Use: Never used   Substance and Sexual Activity   • Alcohol use: Yes     Comment: 4 drinks per week   • Drug use: No   • Sexual activity: Yes     Partners: Male     Birth control/protection: Male Sterilization   Other Topics Concern   • None   Social History Narrative   • None     Social Determinants of Health     Financial Resource Strain: Not on file   Food Insecurity: Not on file   Transportation Needs: Not on file   Physical Activity: Not on file   Stress: Not on file   Social Connections: Not on file   Intimate Partner Violence: Not on file   Housing Stability: Not on file Current Outpatient Medications:   •  acetaminophen (TYLENOL) 650 mg CR tablet, Take 1 tablet (650 mg total) by mouth every 8 (eight) hours as needed for mild pain, Disp: 30 tablet, Rfl: 0  •  ALPRAZolam (XANAX) 0 25 mg tablet, TAKE 1 TO 2 TABLETS BY MOUTH TWICE A DAY AS NEEDED FOR ANXIETY, Disp: , Rfl:   •  cephalexin (KEFLEX) 500 mg capsule, Take 1 capsule (500 mg total) by mouth every 12 (twelve) hours for 7 days, Disp: 14 capsule, Rfl: 0  •  escitalopram (LEXAPRO) 20 mg tablet, Take 20 mg by mouth daily, Disp: , Rfl:   •  fluconazole (DIFLUCAN) 150 mg tablet, Take 150 mg by mouth daily, Disp: , Rfl:   •  ibuprofen (MOTRIN) 600 mg tablet, Take 1 tablet (600 mg total) by mouth every 6 (six) hours as needed for mild pain, Disp: 30 tablet, Rfl: 0  •  ondansetron (ZOFRAN) 4 mg tablet, Take 1 tablet (4 mg total) by mouth every 8 (eight) hours as needed for nausea or vomiting for up to 7 days (Patient not taking: Reported on 12/21/2022), Disp: 21 tablet, Rfl: 0  •  ondansetron (ZOFRAN-ODT) 4 mg disintegrating tablet, Take 1 tablet (4 mg total) by mouth every 8 (eight) hours as needed for nausea or vomiting (Patient not taking: Reported on 9/22/2022), Disp: 10 tablet, Rfl: 0  •  tamsulosin (FLOMAX) 0 4 mg, Take 1 capsule (0 4 mg total) by mouth daily with dinner for 7 days, Disp: 7 capsule, Rfl: 0  No current facility-administered medications for this visit  Review of Systems   Constitutional: Negative for appetite change, chills, fatigue, fever and unexpected weight change  HENT: Negative for congestion, hearing loss and postnasal drip  Respiratory: Negative for cough and shortness of breath  Cardiovascular: Negative for leg swelling  Musculoskeletal: Negative for gait problem  Skin: Positive for wound (Right buttock)  Negative for rash  Neurological: Negative for numbness  Hematological: Does not bruise/bleed easily  Psychiatric/Behavioral: Negative          Objective:  /78   Pulse 60 Temp 98 1 °F (36 7 °C)   Resp 14   Pain Score:   2     Physical Exam  Vitals and nursing note reviewed  Constitutional:       Appearance: Normal appearance  She is well-developed and normal weight  HENT:      Head: Normocephalic and atraumatic  Cardiovascular:      Rate and Rhythm: Normal rate  Pulmonary:      Effort: Pulmonary effort is normal    Skin:     General: Skin is warm and dry  Findings: Erythema and wound present  Comments: Small open wound without a significant depth  There is surrounding erythema and induration which is tender  Small serosanguineous drainage is noted at this time  Neurological:      Mental Status: She is alert and oriented to person, place, and time  Psychiatric:         Attention and Perception: Attention normal          Mood and Affect: Mood and affect normal          Behavior: Behavior is cooperative  Cognition and Memory: Cognition normal               Wound 12/13/22 Abscess Buttocks Right (Active)   Wound Image Images linked 12/21/22 0936   Wound Description Pink;Yellow 12/21/22 0938   Sandra-wound Assessment Intact; Induration 12/21/22 0938   Wound Length (cm) 0 5 cm 12/21/22 0938   Wound Width (cm) 1 cm 12/21/22 5720   Wound Depth (cm) 0 1 cm 12/21/22 0938   Wound Surface Area (cm^2) 0 5 cm^2 12/21/22 0938   Wound Volume (cm^3) 0 05 cm^3 12/21/22 0938   Calculated Wound Volume (cm^3) 0 05 cm^3 12/21/22 6156   Wound Site Closure -- (patient removed bandage) 12/21/22 0938   Drainage Amount Small 12/21/22 0938   Drainage Description Serosanguineous 12/21/22 0938   Non-staged Wound Description Full thickness 12/21/22 0938   Treatments Irrigation with NSS 12/21/22 0938                 Wound Instructions:  Orders Placed This Encounter   Procedures   • Wound cleansing and dressings     Wash your hands with soap and water  Remove old dressing, discard into plastic bag and place in trash    Cleanse the wound with mild soap and water prior to applying a clean dressing  Do not use tissue or cotton balls  Do not scrub the wound  Pat dry using gauze  Shower yes  Remove dressing before shower  Right buttock wound:   Apply bacitracin to the wound bed  Cover with bordered foam or large bandaid  Change dressing after each sitz bath  Sitz bath: Remove dressing  Fill your bath tub with some warm water  Sit in the water for 10-15, two to three times a day  Finish antibiotic as prescribed  Follow up at the wound center in one week  Treatment today at the wound center: Cleansed with NSS, and dressed as above  Standing Status:   Future     Standing Expiration Date:   12/21/2023             Lisa Barron MD, CHT, CWS    Portions of the record may have been created with voice recognition software  Occasional wrong word or "sound alike" substitutions may have occurred due to the inherent limitations of voice recognition software  Read the chart carefully and recognize, using context, where substitutions have occurred

## 2022-12-21 NOTE — PATIENT INSTRUCTIONS
Orders Placed This Encounter   Procedures    Wound cleansing and dressings     Wash your hands with soap and water  Remove old dressing, discard into plastic bag and place in trash  Cleanse the wound with mild soap and water prior to applying a clean dressing  Do not use tissue or cotton balls  Do not scrub the wound  Pat dry using gauze  Shower yes  Remove dressing before shower  Right buttock wound:   Apply bacitracin to the wound bed  Cover with bordered foam or large bandaid  Change dressing after each sitz bath  Sitz bath: Remove dressing  Fill your bath tub with some warm water  Sit in the water for 10-15, two to three times a day  Finish antibiotic as prescribed  Follow up at the wound center in one week  Treatment today at the wound center: Cleansed with NSS, and dressed as above       Standing Status:   Future     Standing Expiration Date:   12/21/2023

## 2022-12-30 ENCOUNTER — OFFICE VISIT (OUTPATIENT)
Dept: WOUND CARE | Facility: CLINIC | Age: 42
End: 2022-12-30

## 2022-12-30 VITALS
SYSTOLIC BLOOD PRESSURE: 110 MMHG | DIASTOLIC BLOOD PRESSURE: 70 MMHG | HEART RATE: 68 BPM | TEMPERATURE: 98 F | RESPIRATION RATE: 18 BRPM

## 2022-12-30 DIAGNOSIS — L02.31 ABSCESS OF BUTTOCK, RIGHT: Primary | ICD-10-CM

## 2022-12-30 NOTE — PROGRESS NOTES
Patient ID: Vasquez Aguero is a 38 y o  female Date of Birth 1980       Chief Complaint   Patient presents with   • Follow Up Wound Care Visit     Abscess of buttock, right       Allergies:  Patient has no known allergies  Diagnosis:      Diagnosis ICD-10-CM Associated Orders   1  Abscess of buttock, right  L02 31 Wound cleansing and dressings              Assessment & Plan:  Status post I&D of abscess of the right buttock  Now closed  Discharge from wound center  Subjective:   12/21/2022: First visit for this 40-year-old female referred to the wound center status post I&D of abscess of the right buttock  Patient developed an abscess over the last 2 weeks or so and was seen in the emergency room where incision and drainage was performed on 12/13/2022  She was treated empirically with Keflex and Bactrim DS  She only has 1 or 2 days more of Keflex  She has been back to the emergency room twice for follow-up  The last time was on the 17th where a CT scan was done which did not show any recurrent collection  She has been using bacitracin ointment and dry dressing  Past medical history is noncontributory  12/30/2022: Follow-up post I&D of abscess of the right buttock  Noted just a spot of drainage with last dressing change  No pain  No other complaints        The following portions of the patient's history were reviewed and updated as appropriate:   Patient Active Problem List   Diagnosis   • Other chest pain   • Paroxysmal atrial fibrillation ([address] )   • Abnormal weight gain   • Feeling tired   • Mild vitamin D deficiency   • Right knee pain   • Thyroid nodule   • Menorrhagia with regular cycle   • Vaginal discharge   • Encounter for insertion of mirena IUD   • Bacterial vaginosis   • S/P endometrial ablation     Past Medical History:   Diagnosis Date   • A-fib ([address] )     no recurrence since 2015 or so   • Menorrhagia    • Vitamin D deficiency      Past Surgical History:   Procedure Laterality Date   •  SECTION     • NC ARTHROPLASTY RADIAL HEAD W/IMPLANT Right 2022    Procedure: ARTHROPLASTY RADIAL HEAD;  Surgeon: Seda Jimenez;  [facility];  Service: Orthopedics   • NC HYSTEROSCOPY BX ENDOMETRIUM&/POLYPC W/WO D&C N/A 2021    Procedure: DILATATION AND CURETTAGE (D&C) WITH HYSTEROSCOPY;  Surgeon: David East MD;  [facility];  Service: Gynecology   • NC HYSTEROSCOPY ENDOMETRIAL ABLATION N/A 2021    Procedure: Janes Kathleen;  Surgeon: David East MD;  [facility];  Service: Gynecology   • NC TOTAL THYROID LOBECTOMY UNI W/WO ISTHMUSECTOMY Right 2017    Procedure: THYROID LOBECTOMY WITH RECURRENT LARYNGEAL NERVE MONITORING (IMPULSE MONITORING);   Surgeon: Josiah Lujan MD;  [facility];  Service: ENT     Family History   Problem Relation Age of Onset   • Thyroid disease Mother    • Breast cancer Maternal Grandmother    • Breast cancer Maternal Aunt       Social History     Socioeconomic History   • Marital status: /Civil Union     Spouse name: Not on file   • Number of children: Not on file   • Years of education: Not on file   • Highest education level: Not on file   Occupational History   • Not on file   Tobacco Use   • Smoking status: Never   • Smokeless tobacco: Never   Vaping Use   • Vaping Use: Never used   Substance and Sexual Activity   • Alcohol use: Yes     Comment: 4 drinks per week   • Drug use: No   • Sexual activity: Yes     Partners: Male     Birth control/protection: Male Sterilization   Other Topics Concern   • Not on file   Social History Narrative   • Not on file     Social Determinants of Health     Financial Resource Strain: Not on file   Food Insecurity: Not on file   Transportation Needs: Not on file   Physical Activity: Not on file   Stress: Not on file   Social Connections: Not on file   Intimate Partner Violence: Not on file   Housing Stability: Not on file        Current Outpatient Medications:   •  acetaminophen (TYLENOL) 650 mg CR tablet, Take 1 tablet (650 mg total) by mouth every 8 (eight) hours as needed for mild pain, Disp: 30 tablet, Rfl: 0  •  ALPRAZolam (XANAX) 0 25 mg tablet, TAKE 1 TO 2 TABLETS BY MOUTH TWICE A DAY AS NEEDED FOR ANXIETY, Disp: , Rfl:   •  escitalopram (LEXAPRO) 20 mg tablet, Take 20 mg by mouth daily, Disp: , Rfl:   •  fluconazole (DIFLUCAN) 150 mg tablet, Take 150 mg by mouth daily, Disp: , Rfl:   •  ibuprofen (MOTRIN) 600 mg tablet, Take 1 tablet (600 mg total) by mouth every 6 (six) hours as needed for mild pain, Disp: 30 tablet, Rfl: 0  •  ondansetron (ZOFRAN) 4 mg tablet, Take 1 tablet (4 mg total) by mouth every 8 (eight) hours as needed for nausea or vomiting for up to 7 days (Patient not taking: Reported on 12/21/2022), Disp: 21 tablet, Rfl: 0  •  ondansetron (ZOFRAN-ODT) 4 mg disintegrating tablet, Take 1 tablet (4 mg total) by mouth every 8 (eight) hours as needed for nausea or vomiting (Patient not taking: Reported on 9/22/2022), Disp: 10 tablet, Rfl: 0  •  tamsulosin (FLOMAX) 0 4 mg, Take 1 capsule (0 4 mg total) by mouth daily with dinner for 7 days, Disp: 7 capsule, Rfl: 0    Review of Systems   Constitutional: Negative for chills and fever  Cardiovascular: Negative for leg swelling  Musculoskeletal: Negative for gait problem  Skin: Positive for wound (Right buttock)  Negative for rash  Neurological: Negative for numbness  Hematological: Does not bruise/bleed easily  Psychiatric/Behavioral: Negative  Objective:  /70   Pulse 68   Temp 98 °F (36 7 °C)   Resp 18   Pain Score: 0-No pain     Physical Exam  Vitals and nursing note reviewed  Constitutional:       Appearance: Normal appearance  She is well-developed and normal weight  HENT:      Head: Normocephalic and atraumatic  Cardiovascular:      Rate and Rhythm: Normal rate  Pulmonary:      Effort: Pulmonary effort is normal    Skin:     General: Skin is warm and dry  Findings: No erythema or wound  Comments: Right buttock wound is now closed  Small, thin dry eschar  Neurological:      Mental Status: She is alert and oriented to person, place, and time  Psychiatric:         Attention and Perception: Attention normal          Mood and Affect: Mood and affect normal          Behavior: Behavior is cooperative  Cognition and Memory: Cognition normal              Wound 12/13/22 Abscess Buttocks Right (Active)   Wound Image Images linked 12/30/22 1311   Wound Description Intact;Dry (scab) 12/30/22 1311   Sandra-wound Assessment Erythema; Induration 12/30/22 1311   Wound Length (cm) 0 cm 12/30/22 1311   Wound Width (cm) 0 cm 12/30/22 1311   Wound Depth (cm) 0 cm 12/30/22 1311   Wound Surface Area (cm^2) 0 cm^2 12/30/22 1311   Wound Volume (cm^3) 0 cm^3 12/30/22 1311   Calculated Wound Volume (cm^3) 0 cm^3 12/30/22 1311   Change in Wound Size % 100 12/30/22 1311   Drainage Amount None 12/30/22 1311   Patient Tolerance Tolerated well 12/30/22 1311   Dressing Status -- (arrived with no dressing on) 12/30/22 1311                Wound Instructions:  Orders Placed This Encounter   Procedures   • Wound cleansing and dressings     Your wound is healed today  Follow up with Wound Management as needed     Standing Status:   Future     Standing Expiration Date:   12/30/2023             Ashia Huerta MD, CHT, CWS    Portions of the record may have been created with voice recognition software  Occasional wrong word or "sound alike" substitutions may have occurred due to the inherent limitations of voice recognition software  Read the chart carefully and recognize, using context, where substitutions have occurred

## 2022-12-30 NOTE — PATIENT INSTRUCTIONS
Orders Placed This Encounter   Procedures    Wound cleansing and dressings     Your wound is healed today  Follow up with Wound Management as needed     Standing Status:   Future     Standing Expiration Date:   12/30/2023

## 2023-01-10 ENCOUNTER — TELEPHONE (OUTPATIENT)
Dept: UROLOGY | Facility: AMBULATORY SURGERY CENTER | Age: 43
End: 2023-01-10

## 2023-01-10 NOTE — TELEPHONE ENCOUNTER
I spoke with pt and confirmed that I am working on scheduling her procedure with one of our providers  Pt verbalized understanding and will wait to hear back from me to confirm scheduling surgery

## 2023-01-11 NOTE — TELEPHONE ENCOUNTER
I spoke with pt this afternoon and confirmed scheduling her procedure with Dr Corry Peres at [facility] on 2/20/2023  Pt confirmed that this will work, I then verbally went over all of pt 's pre op instructions and prep information with her  She is aware that she needs to have a pre op urine culture 2 weeks prior to surgery  Pt is also aware that she needs to have a  on the day of surgery  Per pt 's request I weill be mailing her a copy of her surgical packet and instructed her to call our office with any questions or concerns regarding this procedure

## 2023-01-27 ENCOUNTER — TELEPHONE (OUTPATIENT)
Dept: UROLOGY | Facility: MEDICAL CENTER | Age: 43
End: 2023-01-27

## 2023-02-07 ENCOUNTER — ANESTHESIA EVENT (OUTPATIENT)
Dept: PERIOP | Facility: AMBULARY SURGERY CENTER | Age: 43
End: 2023-02-07

## 2023-02-08 ENCOUNTER — APPOINTMENT (OUTPATIENT)
Dept: LAB | Facility: CLINIC | Age: 43
End: 2023-02-08

## 2023-02-08 DIAGNOSIS — R39.89 SUSPECTED UTI: ICD-10-CM

## 2023-02-08 DIAGNOSIS — N20.1 URETERAL CALCULUS, RIGHT: ICD-10-CM

## 2023-02-08 DIAGNOSIS — N20.0 KIDNEY STONES: ICD-10-CM

## 2023-02-10 LAB — BACTERIA UR CULT: NORMAL

## 2023-02-17 NOTE — PRE-PROCEDURE INSTRUCTIONS
Pre-Surgery Instructions:   Medication Instructions   • acetaminophen (TYLENOL) 650 mg CR tablet Uses PRN- OK to take day of surgery   • ALPRAZolam (XANAX) 0 25 mg tablet Uses PRN- OK to take day of surgery   • escitalopram (LEXAPRO) 20 mg tablet Take day of surgery  • ibuprofen (MOTRIN) 600 mg tablet Stop taking 7 days prior to surgery  INSTR ON NISHANT CALL,  REPORT LOC , BRING PHOTO ID/MED LIST/INS  INFO ,SHOWER REV , STOP ASA/NSAID/VIT 7 DAY PREOP, PT VERBALIZES UNDERSTANDING W/ NO FURTHER QUESTIONS

## 2023-02-20 ENCOUNTER — APPOINTMENT (OUTPATIENT)
Dept: RADIOLOGY | Facility: AMBULARY SURGERY CENTER | Age: 43
End: 2023-02-20

## 2023-02-20 ENCOUNTER — ANESTHESIA (OUTPATIENT)
Dept: PERIOP | Facility: AMBULARY SURGERY CENTER | Age: 43
End: 2023-02-20

## 2023-02-20 ENCOUNTER — HOSPITAL ENCOUNTER (OUTPATIENT)
Facility: AMBULARY SURGERY CENTER | Age: 43
Setting detail: OUTPATIENT SURGERY
Discharge: HOME | End: 2023-02-20
Attending: UROLOGY | Admitting: UROLOGY

## 2023-02-20 ENCOUNTER — TELEPHONE (OUTPATIENT)
Dept: UROLOGY | Facility: CLINIC | Age: 43
End: 2023-02-20

## 2023-02-20 VITALS
HEART RATE: 63 BPM | WEIGHT: 150 LBS | OXYGEN SATURATION: 100 % | RESPIRATION RATE: 18 BRPM | SYSTOLIC BLOOD PRESSURE: 122 MMHG | TEMPERATURE: 98.3 F | DIASTOLIC BLOOD PRESSURE: 65 MMHG | BODY MASS INDEX: 24.99 KG/M2 | HEIGHT: 65 IN

## 2023-02-20 DIAGNOSIS — N20.1 URETERAL CALCULUS, RIGHT: Primary | ICD-10-CM

## 2023-02-20 DEVICE — INLAY OPTIMA URETERAL STENT W/O GUIDEWIRE
Type: IMPLANTABLE DEVICE | Site: ABDOMEN | Status: FUNCTIONAL
Brand: BARD® INLAY OPTIMA® URETERAL STENT

## 2023-02-20 RX ORDER — MIDAZOLAM HYDROCHLORIDE 2 MG/2ML
INJECTION, SOLUTION INTRAMUSCULAR; INTRAVENOUS AS NEEDED
Status: DISCONTINUED | OUTPATIENT
Start: 2023-02-20 | End: 2023-02-20

## 2023-02-20 RX ORDER — OXYBUTYNIN CHLORIDE 5 MG/1
5 TABLET ORAL 3 TIMES DAILY PRN
Qty: 30 TABLET | Refills: 0 | Status: SHIPPED | OUTPATIENT
Start: 2023-02-20 | End: 2023-03-02

## 2023-02-20 RX ORDER — FENTANYL CITRATE 50 UG/ML
INJECTION, SOLUTION INTRAMUSCULAR; INTRAVENOUS AS NEEDED
Status: DISCONTINUED | OUTPATIENT
Start: 2023-02-20 | End: 2023-02-20

## 2023-02-20 RX ORDER — PROPOFOL 10 MG/ML
INJECTION, EMULSION INTRAVENOUS AS NEEDED
Status: DISCONTINUED | OUTPATIENT
Start: 2023-02-20 | End: 2023-02-20

## 2023-02-20 RX ORDER — SODIUM CHLORIDE 450 MG/100ML
125 INJECTION, SOLUTION INTRAVENOUS CONTINUOUS
Status: DISCONTINUED | OUTPATIENT
Start: 2023-02-20 | End: 2023-02-20 | Stop reason: HOSPADM

## 2023-02-20 RX ORDER — CEFDINIR 300 MG/1
300 CAPSULE ORAL EVERY 12 HOURS SCHEDULED
Qty: 6 CAPSULE | Refills: 0 | Status: SHIPPED | OUTPATIENT
Start: 2023-02-20 | End: 2023-02-23

## 2023-02-20 RX ORDER — ONDANSETRON 2 MG/ML
4 INJECTION INTRAMUSCULAR; INTRAVENOUS ONCE AS NEEDED
Status: DISCONTINUED | OUTPATIENT
Start: 2023-02-20 | End: 2023-02-20 | Stop reason: HOSPADM

## 2023-02-20 RX ORDER — FENTANYL CITRATE/PF 50 MCG/ML
25 SYRINGE (ML) INJECTION
Status: DISCONTINUED | OUTPATIENT
Start: 2023-02-20 | End: 2023-02-20 | Stop reason: HOSPADM

## 2023-02-20 RX ORDER — MAGNESIUM HYDROXIDE 1200 MG/15ML
LIQUID ORAL AS NEEDED
Status: DISCONTINUED | OUTPATIENT
Start: 2023-02-20 | End: 2023-02-20 | Stop reason: HOSPADM

## 2023-02-20 RX ORDER — CEFAZOLIN SODIUM 2 G/50ML
2000 SOLUTION INTRAVENOUS
Status: COMPLETED | OUTPATIENT
Start: 2023-02-20 | End: 2023-02-20

## 2023-02-20 RX ORDER — PHENAZOPYRIDINE HYDROCHLORIDE 200 MG/1
200 TABLET, FILM COATED ORAL
Qty: 6 TABLET | Refills: 0 | Status: SHIPPED | OUTPATIENT
Start: 2023-02-20 | End: 2023-02-22

## 2023-02-20 RX ORDER — ACETAMINOPHEN 325 MG/1
975 TABLET ORAL ONCE
Status: COMPLETED | OUTPATIENT
Start: 2023-02-20 | End: 2023-02-20

## 2023-02-20 RX ORDER — KETOROLAC TROMETHAMINE 30 MG/ML
INJECTION, SOLUTION INTRAMUSCULAR; INTRAVENOUS AS NEEDED
Status: DISCONTINUED | OUTPATIENT
Start: 2023-02-20 | End: 2023-02-20

## 2023-02-20 RX ORDER — LIDOCAINE HYDROCHLORIDE 10 MG/ML
INJECTION, SOLUTION EPIDURAL; INFILTRATION; INTRACAUDAL; PERINEURAL AS NEEDED
Status: DISCONTINUED | OUTPATIENT
Start: 2023-02-20 | End: 2023-02-20

## 2023-02-20 RX ORDER — GLYCOPYRROLATE 0.2 MG/ML
INJECTION INTRAMUSCULAR; INTRAVENOUS AS NEEDED
Status: DISCONTINUED | OUTPATIENT
Start: 2023-02-20 | End: 2023-02-20

## 2023-02-20 RX ORDER — DEXAMETHASONE SODIUM PHOSPHATE 10 MG/ML
INJECTION, SOLUTION INTRAMUSCULAR; INTRAVENOUS AS NEEDED
Status: DISCONTINUED | OUTPATIENT
Start: 2023-02-20 | End: 2023-02-20

## 2023-02-20 RX ORDER — SODIUM CHLORIDE, SODIUM LACTATE, POTASSIUM CHLORIDE, CALCIUM CHLORIDE 600; 310; 30; 20 MG/100ML; MG/100ML; MG/100ML; MG/100ML
125 INJECTION, SOLUTION INTRAVENOUS CONTINUOUS
Status: DISCONTINUED | OUTPATIENT
Start: 2023-02-20 | End: 2023-02-20 | Stop reason: HOSPADM

## 2023-02-20 RX ORDER — ONDANSETRON 2 MG/ML
INJECTION INTRAMUSCULAR; INTRAVENOUS AS NEEDED
Status: DISCONTINUED | OUTPATIENT
Start: 2023-02-20 | End: 2023-02-20

## 2023-02-20 RX ADMIN — LIDOCAINE HYDROCHLORIDE 50 MG: 10 INJECTION, SOLUTION EPIDURAL; INFILTRATION; INTRACAUDAL at 08:52

## 2023-02-20 RX ADMIN — FENTANYL CITRATE 25 MCG: 50 INJECTION INTRAMUSCULAR; INTRAVENOUS at 09:18

## 2023-02-20 RX ADMIN — ONDANSETRON 4 MG: 2 INJECTION INTRAMUSCULAR; INTRAVENOUS at 09:32

## 2023-02-20 RX ADMIN — KETOROLAC TROMETHAMINE 15 MG: 30 INJECTION, SOLUTION INTRAMUSCULAR at 09:47

## 2023-02-20 RX ADMIN — PROPOFOL 250 MG: 10 INJECTION, EMULSION INTRAVENOUS at 08:52

## 2023-02-20 RX ADMIN — ACETAMINOPHEN 975 MG: 325 TABLET ORAL at 08:14

## 2023-02-20 RX ADMIN — CEFAZOLIN SODIUM 2000 MG: 2 SOLUTION INTRAVENOUS at 08:45

## 2023-02-20 RX ADMIN — SODIUM CHLORIDE, SODIUM LACTATE, POTASSIUM CHLORIDE, AND CALCIUM CHLORIDE: .6; .31; .03; .02 INJECTION, SOLUTION INTRAVENOUS at 08:09

## 2023-02-20 RX ADMIN — DEXAMETHASONE SODIUM PHOSPHATE 10 MG: 10 INJECTION, SOLUTION INTRAMUSCULAR; INTRAVENOUS at 08:55

## 2023-02-20 RX ADMIN — GLYCOPYRROLATE 0.1 MG: 0.2 INJECTION, SOLUTION INTRAMUSCULAR; INTRAVENOUS at 09:26

## 2023-02-20 RX ADMIN — MIDAZOLAM HYDROCHLORIDE 2 MG: 1 INJECTION, SOLUTION INTRAMUSCULAR; INTRAVENOUS at 08:45

## 2023-02-20 RX ADMIN — FENTANYL CITRATE 25 MCG: 50 INJECTION INTRAMUSCULAR; INTRAVENOUS at 08:52

## 2023-02-20 NOTE — ANESTHESIA PREPROCEDURE EVALUATION
Procedure:  CYSTOSCOPY URETEROSCOPY WITH LITHOTRIPSY HOLMIUM LASER, RETROGRADE PYELOGRAM AND INSERTION STENT URETERAL (Right: Bladder)      Joanna Lord is a 38 y o  female with no prior urologic history and no family history of kidney stones  She is in the emergency room with a perirectal abscess  CAT scan had an incidental finding of a proximal right ureter calculus with mild hydronephrosis  She is totally asymptomatic and pain-free  She does not get urinary tract infections  Urine is clear  Relevant Problems   CARDIO   (+) Other chest pain   (+) Paroxysmal atrial fibrillation (HCC)        Physical Exam    Airway    Mallampati score: II  TM Distance: >3 FB  Neck ROM: full     Dental       Cardiovascular  Cardiovascular exam normal    Pulmonary  Pulmonary exam normal     Other Findings        Anesthesia Plan  ASA Score- 2     Anesthesia Type- general with ASA Monitors  Additional Monitors:   Airway Plan: LMA  Plan Factors-Exercise tolerance (METS): >4 METS  Chart reviewed  Existing labs reviewed  Patient summary reviewed  Patient is not a current smoker  Induction- intravenous  Postoperative Plan-   Planned trial extubation    Informed Consent- Anesthetic plan and risks discussed with patient  I personally reviewed this patient with the CRNA  Discussed and agreed on the Anesthesia Plan with the CRNA  Dayanna Ross            History Comments History Comments   A-fib ([address] ) no recurrence since  or so Menorrhagia    Vitamin D deficiency  Kidney stone      Obstetric History       4    Para   4    Term   3       1    AB        Living   3      SAB        IAB        Ectopic        Multiple        Live Births   3          Surgical History     Current as of 23   SECTION UT TOTAL THYROID LOBECTOMY UNI W/WO ISTHMUSECTOMY   UT HYSTEROSCOPY BX ENDOMETRIUM&/POLYPC W/WO D&C UT HYSTEROSCOPY ENDOMETRIAL ABLATION   UT ARTHROPLASTY RADIAL HEAD W/IMPLANT Substance History     Current as of 02/19/23 2052  Smoking Status: Never   Smokeless Tobacco Status: Never   Alcohol use: Yes, unspecified volume   Drug use: No     Problem List     Current as of 02/19/23 2052  Other chest pain   Paroxysmal atrial fibrillation (HCC)   Abnormal weight gain   Feeling tired   Mild vitamin D deficiency   Right knee pain   Thyroid nodule   Menorrhagia with regular cycle   Vaginal discharge   Encounter for insertion of mirena IUD   Bacterial vaginosis   S/P endometrial ablation

## 2023-02-20 NOTE — H&P
H&P Exam - Aleah Bradley 38 y o  female MRN: [id number]    Unit/Bed#: OR [place] Encounter: [id number]    Assessment:  Right proximal stone without infection    Plan:  Right ureteroscopy    We discussed the risks of ureteroscopy to include inability to reach or treat all of the targeted stone, bleeding, infection and damage to the ureter which could result in a urine leak or stricture which in turn could require additional surgery, lifelong stents, and even the potential for loss of the kidney  History of Present Illness     Nicho Llamas is a 38 y o  female with no prior urologic history and no family history of kidney stones  She is in the emergency room with a perirectal abscess  CTscan 22 had an incidental finding of a proximal right ureter calculus with mild hydronephrosis  She is totally asymptomatic and pain-free  She does not get urinary tract infections  Urine is clear (cx returned contaminated)  Review of Systems   Constitutional: Negative for chills and fever  HENT: Negative for ear pain and sore throat  Eyes: Negative for pain and visual disturbance  Respiratory: Negative for cough and shortness of breath  Cardiovascular: Negative for chest pain and palpitations  Gastrointestinal: Negative for abdominal pain and vomiting  Genitourinary: Negative for dysuria and hematuria  Musculoskeletal: Negative for arthralgias and back pain  Skin: Negative for color change and rash  Neurological: Negative for seizures and syncope  All other systems reviewed and are negative        Historical Information   Past Medical History:   Diagnosis Date   • A-fib ([address] )     no recurrence since  or so   • Kidney stone    • Menorrhagia    • Vitamin D deficiency      Past Surgical History:   Procedure Laterality Date   •  SECTION     • NV ARTHROPLASTY RADIAL HEAD W/IMPLANT Right 2022    Procedure: ARTHROPLASTY RADIAL HEAD;  Surgeon: Chelsea Alonso;  Location:  MAIN OR; Service: Orthopedics   • NV HYSTEROSCOPY BX ENDOMETRIUM&/POLYPC W/WO D&C N/A 9/28/2021    Procedure: DILATATION AND CURETTAGE (D&C) WITH HYSTEROSCOPY;  Surgeon: Lizabeth Chaney MD;  Location: [facility];  Service: Gynecology   • NV HYSTEROSCOPY ENDOMETRIAL ABLATION N/A 9/28/2021    Procedure: Yolette Mata;  Surgeon: Lizabeth Chaney MD;  Location: [facility];  Service: Gynecology   • NV TOTAL THYROID LOBECTOMY UNI W/WO ISTHMUSECTOMY Right 6/21/2017    Procedure: THYROID LOBECTOMY WITH RECURRENT LARYNGEAL NERVE MONITORING (IMPULSE MONITORING);   Surgeon: Dora Vasquez MD;  Location[facility];  Service: ENT     Social History   Social History     Substance and Sexual Activity   Alcohol Use Yes    Comment: 4 drinks per week     Social History     Substance and Sexual Activity   Drug Use No     Social History     Tobacco Use   Smoking Status Never   Smokeless Tobacco Never     E-Cigarette Use: Never User     E-Cigarette/Vaping Substances   • Nicotine No    • THC No    • CBD No    • Flavoring No    • Other No    • Unknown No        Family History: non-contributory    Meds/Allergies   all medications and allergies reviewed  No Known Allergies    Objective   First Vitals:   Blood Pressure: 121/72 (02/20/23 0805)  Pulse: 69 (02/20/23 0805)  Temperature: 97 8 °F (36 6 °C) (02/20/23 0805)  Temp Source: Temporal (02/20/23 0805)  Respirations: 18 (02/20/23 0805)  Height: 5' 5" (165 1 cm) (02/20/23 0805)  Weight - Scale: 68 kg (150 lb) (02/20/23 0805)  SpO2: 98 % (02/20/23 0805)    Current Vitals:   Blood Pressure: 121/72 (02/20/23 0805)  Pulse: 69 (02/20/23 0805)  Temperature: 97 8 °F (36 6 °C) (02/20/23 0805)  Temp Source: Temporal (02/20/23 0805)  Respirations: 18 (02/20/23 0805)  Height: 5' 5" (165 1 cm) (02/20/23 0805)  Weight - Scale: 68 kg (150 lb) (02/20/23 0805)  SpO2: 98 % (02/20/23 0805)    No intake or output data in the 24 hours ending 02/20/23 0839    Invasive Devices     Peripheral Intravenous Line Duration           Peripheral IV 23 Left Hand <1 day                Physical Exam  Vitals and nursing note reviewed  Constitutional:       General: She is not in acute distress  Appearance: She is well-developed  HENT:      Head: Normocephalic and atraumatic  Eyes:      Conjunctiva/sclera: Conjunctivae normal    Cardiovascular:      Rate and Rhythm: Normal rate and regular rhythm  Heart sounds: No murmur heard  Pulmonary:      Effort: Pulmonary effort is normal  No respiratory distress  Breath sounds: Normal breath sounds  Abdominal:      Palpations: Abdomen is soft  Tenderness: There is no abdominal tenderness  Musculoskeletal:         General: No swelling  Cervical back: Neck supple  Skin:     General: Skin is warm and dry  Capillary Refill: Capillary refill takes less than 2 seconds  Neurological:      Mental Status: She is alert     Psychiatric:         Mood and Affect: Mood normal          Lab Results: ucx contaminated <40k  Imaginmm proximal right stone

## 2023-02-20 NOTE — TELEPHONE ENCOUNTER
The patient underwent right-sided uroscopy for a large hard  impacted stone  Given the impacted stone we will plan for stent removal in 3 weeks       We will then obtain KUB and Renal US 4 weeks later

## 2023-02-20 NOTE — TELEPHONE ENCOUNTER
Cysto stent removal scheduled for 3/13/23 @ [address] office  Follow up appt after stent removal to review KUB & US results 4/17/23 @ [address] office    Added to stent list   Will call patient after discharge

## 2023-02-20 NOTE — OP NOTE
OPERATIVE REPORT  PATIENT NAME: David Bradley    :  1980  MRN: [id number]  Pt Location: [facility]OM 04    SURGERY DATE: 2023    Surgeon(s) and Role:     * Hortensia Baron MD - Primary    Preop Diagnosis:  Ureteral calculus, right [N20 1]    Post-Op Diagnosis Codes:     * Ureteral calculus, right [N20 1]    Procedure(s):  Right - CYSTOSCOPY URETEROSCOPY WITH LITHOTRIPSY HOLMIUM LASER BASKET STONE RETRIEVAL  Lajean Cassette RETROGRADE PYELOGRAM AND INSERTION STENT URETERAL    Specimen(s):  ID Type Source Tests Collected by Time Destination   A : RIGHT RENAL CALCULI Calculus Kidney, Right STONE ANALYSIS Hortensia Baron MD 2023 [id number]        Estimated Blood Loss:   Minimal    Drains:  Ureteral Internal Stent Right ureter 7 Fr  (Active)   Number of days: 0       Anesthesia Type:   General    Operative Indications:  Patient with large proximal right ureteral stone with associated hydronephrosis found incidentally on CT scan for other reasons  Patient is asymptomatic  Operative Findings:  Large brown impacted stone in the proximal ureter  Stone fragmented and basket extracted  7 Burmese stent without string placed`    Complications:   None    Procedure and Technique:  After informed consent including the risks of bleeding, infection, ureteral injury, and need for secondary procedures, patient was placed supine in the operating room theater  Gen  anesthesia was administered  They were then placed in the dorsal lithotomy position and sterilely prepped and draped in usual fashion  Antibiotic prophylaxis and DVT prophylaxis were administered Cystoscopy was performed the 21 Burmese cystoscope 30° lens  This revealed a normal urethra  Inspection of the bladder revealed no abnormalities  Fluoroscopy did show evidence of a stone in proximal ureter  Attention was turned to the right ureteral orifice  A 5fr open ended catheter was attempted to be passed into the ureteral orifice   A retrograde ureteropyelogram was performed showing moderate proximal hydro-ureteronephrosis with obvious filing defect  Then a 0 38 sensor guidewire was passed through the open ended catheter and up the ureter into the renal pelvis although's first had some difficulty navigating past the stone  A secondary solo guidewire was then placed but could not get around the stone spite multiple attempts  A 10/12 access sheath was passed up sequentially over the wire not able to pass a stone  under fluoroscopic guidance without any significant resistance felt with passage of the inner or inner and outer sheath combined so that the endwas in the proximal ureter  A 5fr flexible ureteroscope was then passed through the access sheath into the proximal ureter  The stone was encountered in the proximal ureter  It   The stone was noted to be brown and hard and impacted  This was partially fragmented with the use of a 270 micron holmium laser fiber at combination of settings of 0 2J and 50Hz and 1 0J and 10Hz  Sequential passes were then made with a zero tip wire basket in order to retrieve stone debris  The stone was then fragmented further with laser and basket was used again to obtain new fargmetns  The final laser fragmentation resulted in pieces retropulsed into the collecting system where basket was used to extract from  The collecting system was examined again and no significant residual stone fragments were appreciated  Hemostasis was appropriate  The ureteroscope was backed down the ureter under vision and there were no residual fragments and the ureter was noted to be intact with no injury and moderate edema where the stone was located  Retrogrades from the proximal mid ureter did not show any signs of extravasation but did demonstrate hydronephrosis and some narrowing at the area of stone location  A 7 x 26 double-J stent inserted without difficulty  The string was not left on  The bladder was drained     The patient was placed back supine, awakened from general anesthesia and brought to recovery room in stable condition  PLAN: Given the impacted nature of the patient stone we will plan for otoscopy and stent removal in 3 weeks  They will then follow up with a KUB and ultrasound approximately 6 weeks later       A qualified resident physician was not available    Patient Disposition:  PACU         SIGNATURE: Jono Bravo MD  DATE: February 20, 2023  TIME: 9:58 AM

## 2023-02-20 NOTE — TELEPHONE ENCOUNTER
Spoke with patient and reviewed common stent symptoms and expectations along with conservative measures to alleviate stent discomfort  She is scheduled for cysto stent removal on 3/13/23 @ [facility]  Patient had no further questions or concerns at this time

## 2023-02-20 NOTE — DISCHARGE INSTR - AVS FIRST PAGE
Kassy Warren: Your surgery went well  Your stone was quite hard and impacted into the kidney tube but it was able to be fragmented to small pieces and subsequent fragments extracted via basket such that a residual stone should be small like grains of sand that can pass on their own  Because the stone was quite hard and impacted we will plan to leave the stent in place for a few weeks to help the ureter heal as I am otherwise concerned that the ureter could scar where the stone was located    We will remove your stent in clinic via cystoscopy in 3 weeks  This is a quick outpatient procedure for which you are awake and has mild discomfort  Please plan to take an antibiotic around the stent removal starting the day before, the day of and finishing the day after stent removal     It is important to ensure you have healed well from surgery and therefore we will plan for an abdominal X-ray and kidney ultrasound approximately 4-6 weeks after the stent is removed  Please take your medications as prescribed with caution for comfort  Most importantly please drink 6-8 glasses of water per day    Please call with any questions or concerns  [address] [facility]  [phone]            WHAT IS A STENT? At the end of the procedure, your doctor may place a stent into your ureter  A stent is a thin, flexible piece of plastic that will hold open your ureter while the remaining small pieces of stone pass  This allows your kidney to drain easily and prevents you from having to “pass” these small stone pieces on your own, which could be painful  The stent is about 12 inches long and looks and feels like a thin piece of spaghetti  AFTER THE PROCEDURE  After the procedure you may experience the following symptoms  All of these are normal and should resolve within 1 or 2 days after your stent is removed    Urinary frequency (urinating more often than usual)  Urinary urgency (the sensation that you need to urinate right away)  Painful urination (this can be pain in your bladder or in your back when  you urinate)  Blood in your urine ( a stent can irritate the lining of your bladder causing it to bleed)  Back/Flank pain, especially with urination    You may receive a prescription for narcotic pain medication after the procedure  You will also receive a prescription for tamsulosin which you will take once a day for 2 weeks to help relax your ureter and decrease stent discomfort  You will also need to purchase a stool softener (i e  Colace) or mild laxative (i e  Miralax) as the narcotic pain medication can make you constipated  This is important as constipation can exacerbate stent related symptoms  STENT REMOVAL  In some cases, your doctor will leave strings attached to your stent  The strings will be taped to your skin after the procedure  The strings will allow you to remove the thin flexible stent while you are at home  Normally, the stent can be removed 3-5 days after your procedure; your physician will tell you the specific date after your procedure  On the day you are supposed to remove your stent, do the following:  When you wake up in the morning, take 1-2 pain pills with food  Start your antibiotic pill the morning of schedule stent removal if prescribed  One hour later sit on the toilet or in the bath tub  Take a deep breath in and while exhaling, pull the string  Dispose of the stent in the garbage  Alternatively, you will come back for an office procedure to remove the stent by placing a small camera into your bladder to remove the stent  During the next 4-8 hours after removing your stent, you may experience additional blood in your urine, pain with urination or back/side pain  You should take the pain medication you were prescribed to help you with the pain, as well as continue the Flomax   If the pain is severe, you are vomiting, and/or have a fever > 101 4 please call the clinic

## 2023-02-20 NOTE — ANESTHESIA POSTPROCEDURE EVALUATION
Post-Op Assessment Note    CV Status:  Stable  Pain Score: 0    Pain management: adequate     Mental Status:  Alert and awake   Hydration Status:  Euvolemic   PONV Controlled:  Controlled   Airway Patency:  Patent      Post Op Vitals Reviewed: Yes      Staff: CRNA, Anesthesiologist         No notable events documented      /72 (02/20/23 1010)    Temp 98 1 °F (36 7 °C) (02/20/23 1010)    Pulse 68 (02/20/23 1010)   Resp 16 (02/20/23 1010)    SpO2 98 % (02/20/23 1010)

## 2023-02-24 LAB
CALCIUM OXALATE DIHYDRATE MFR STONE IR: 40 %
COLOR STONE: NORMAL
COM MFR STONE: 55 %
COMMENT-STONE3: NORMAL
COMPOSITION: NORMAL
HYDROXYAPATITE 24H ENGDIFF UR: 5 %
LABORATORY COMMENT REPORT: NORMAL
PHOTO: NORMAL
SIZE STONE: NORMAL MM
SPEC SOURCE SUBJ: NORMAL
STONE ANALYSIS-IMP: NORMAL
WT STONE: 157 MG

## 2023-03-13 ENCOUNTER — PROCEDURE VISIT (OUTPATIENT)
Dept: UROLOGY | Facility: CLINIC | Age: 43
End: 2023-03-13

## 2023-03-13 VITALS
WEIGHT: 150 LBS | SYSTOLIC BLOOD PRESSURE: 120 MMHG | HEIGHT: 65 IN | DIASTOLIC BLOOD PRESSURE: 70 MMHG | BODY MASS INDEX: 24.99 KG/M2

## 2023-03-13 DIAGNOSIS — Z96.0 RETAINED URETERAL STENT: Primary | ICD-10-CM

## 2023-03-13 NOTE — PROGRESS NOTES
Cystoscopy     Date/Time 3/13/2023 10:00 AM     Performed by  Estrella Persaud PA-C     Authorized by Estrella Persaud PA-C      Universal Protocol:  Risks and benefits: risks, benefits and alternatives were discussed  Consent given by: patient  Patient understanding: patient states understanding of the procedure being performed        Procedure Details:  Procedure type: simple removal of a foreign body, stone, or stent    Patient tolerance: Patient tolerated the procedure well with no immediate complications    Additional Procedure Details: 40-year-old female with history of kidney stones  She had a right ureteroscopy February 20 and is here for stent removal   Patient is placed in the dorsolithotomy position  The groin is prepped and draped in the usual fashion  The flexible cystoscope is passed per the meatus  After adequate filling inspection the mucosa revealed no abnormalities  The stent was identified emanating barely from the right ureteral orifice with some edema  It was grasped with the forceps and removed without difficulty  Patient is made aware of postprocedure care  Signs and symptoms of post stent removal that may occur and understands  She was on prophylactic antibiotics preprocedure  Will follow-up in 6 weeks with ultrasound and KUB prior to visit

## 2023-09-07 ENCOUNTER — HOSPITAL ENCOUNTER (OUTPATIENT)
Dept: MAMMOGRAPHY | Facility: HOSPITAL | Age: 43
Discharge: HOME | End: 2023-09-07
Attending: STUDENT IN AN ORGANIZED HEALTH CARE EDUCATION/TRAINING PROGRAM
Payer: COMMERCIAL

## 2023-09-07 VITALS — BODY MASS INDEX: 24.98 KG/M2 | HEIGHT: 65 IN | WEIGHT: 149.91 LBS

## 2023-09-07 DIAGNOSIS — Z12.31 SCREENING MAMMOGRAM FOR BREAST CANCER: ICD-10-CM

## 2023-09-07 PROCEDURE — 77067 SCR MAMMO BI INCL CAD: CPT

## 2023-09-07 PROCEDURE — 77063 BREAST TOMOSYNTHESIS BI: CPT

## 2023-10-11 ENCOUNTER — HOSPITAL ENCOUNTER (OUTPATIENT)
Dept: RADIOLOGY | Facility: HOSPITAL | Age: 43
Discharge: HOME | End: 2023-10-11
Attending: STUDENT IN AN ORGANIZED HEALTH CARE EDUCATION/TRAINING PROGRAM
Payer: COMMERCIAL

## 2023-10-11 DIAGNOSIS — R92.8 ABNORMAL MAMMOGRAM: ICD-10-CM

## 2023-10-11 PROCEDURE — 76642 ULTRASOUND BREAST LIMITED: CPT

## 2023-10-13 DIAGNOSIS — N63.15 BREAST LUMP ON RIGHT SIDE AT 3 O'CLOCK POSITION: ICD-10-CM

## 2023-10-13 DIAGNOSIS — N63.15 BREAST LUMP ON RIGHT SIDE AT 6 O'CLOCK POSITION: Primary | ICD-10-CM

## 2024-04-11 ENCOUNTER — NURSE TRIAGE (OUTPATIENT)
Age: 44
End: 2024-04-11

## 2024-04-11 NOTE — TELEPHONE ENCOUNTER
Patient last seen 2/23/22  Began with discharge, internal and external I&b 3 days ago.  Offered appointment but patient used monostat 1 last night and complains o severe burning. Unable to schedule appointment due to work. She will monitor and consisder care now.  States she will call back to schedule annual

## 2024-04-11 NOTE — TELEPHONE ENCOUNTER
Regarding: pt used over counter for yeast infeciton in alot of pain  ----- Message from Sarina Luong sent at 4/11/2024 11:15 AM EDT -----  Patient called and used over the counter for a yeast infection and she is in a lot of pain.  Please call patient back at [phone]. Thank you

## 2024-07-23 ENCOUNTER — APPOINTMENT (OUTPATIENT)
Dept: RADIOLOGY | Facility: MEDICAL CENTER | Age: 44
End: 2024-07-23
Payer: COMMERCIAL

## 2024-07-23 DIAGNOSIS — N39.0 URINARY TRACT INFECTION WITHOUT HEMATURIA, SITE UNSPECIFIED: ICD-10-CM

## 2024-07-23 PROCEDURE — 74018 RADEX ABDOMEN 1 VIEW: CPT

## 2024-11-24 ENCOUNTER — OFFICE VISIT (OUTPATIENT)
Dept: URGENT CARE | Facility: CLINIC | Age: 44
End: 2024-11-24
Payer: COMMERCIAL

## 2024-11-24 VITALS
TEMPERATURE: 97.8 F | HEIGHT: 66 IN | OXYGEN SATURATION: 100 % | WEIGHT: 135 LBS | SYSTOLIC BLOOD PRESSURE: 121 MMHG | RESPIRATION RATE: 18 BRPM | BODY MASS INDEX: 21.69 KG/M2 | DIASTOLIC BLOOD PRESSURE: 73 MMHG | HEART RATE: 94 BPM

## 2024-11-24 DIAGNOSIS — H65.01 RIGHT ACUTE SEROUS OTITIS MEDIA, RECURRENCE NOT SPECIFIED: ICD-10-CM

## 2024-11-24 DIAGNOSIS — R10.9 FLANK PAIN: Primary | ICD-10-CM

## 2024-11-24 DIAGNOSIS — J06.9 URI WITH COUGH AND CONGESTION: ICD-10-CM

## 2024-11-24 DIAGNOSIS — J02.8 ACUTE PHARYNGITIS DUE TO OTHER SPECIFIED ORGANISMS: ICD-10-CM

## 2024-11-24 LAB
SL AMB  POCT GLUCOSE, UA: ABNORMAL
SL AMB LEUKOCYTE ESTERASE,UA: ABNORMAL
SL AMB POCT BILIRUBIN,UA: ABNORMAL
SL AMB POCT BLOOD,UA: ABNORMAL
SL AMB POCT CLARITY,UA: ABNORMAL
SL AMB POCT COLOR,UA: YELLOW
SL AMB POCT KETONES,UA: ABNORMAL
SL AMB POCT NITRITE,UA: ABNORMAL
SL AMB POCT PH,UA: 8.5
SL AMB POCT SPECIFIC GRAVITY,UA: 1
SL AMB POCT URINE PROTEIN: ABNORMAL
SL AMB POCT UROBILINOGEN: 0.2

## 2024-11-24 PROCEDURE — S9083 URGENT CARE CENTER GLOBAL: HCPCS | Performed by: NURSE PRACTITIONER

## 2024-11-24 PROCEDURE — 81002 URINALYSIS NONAUTO W/O SCOPE: CPT | Performed by: NURSE PRACTITIONER

## 2024-11-24 PROCEDURE — G0382 LEV 3 HOSP TYPE B ED VISIT: HCPCS | Performed by: NURSE PRACTITIONER

## 2024-11-24 RX ORDER — AMOXICILLIN 875 MG/1
875 TABLET, COATED ORAL 2 TIMES DAILY
Qty: 20 TABLET | Refills: 0 | Status: SHIPPED | OUTPATIENT
Start: 2024-11-24 | End: 2024-12-04

## 2024-11-24 RX ORDER — FLUCONAZOLE 150 MG/1
TABLET ORAL
Qty: 2 TABLET | Refills: 0 | Status: SHIPPED | OUTPATIENT
Start: 2024-11-24 | End: 2024-11-27

## 2024-11-24 NOTE — PATIENT INSTRUCTIONS
You have been prescribed amoxicillin for ear infection and sorethroat.  You are to take the diflucan as prescribed     You are to do warm salt water gargles 4 x daily.  Drink warm tea with honey and lemon.  Take tylenol or motrin as able for pain or fever.  Chloraseptic throat spray, cough drops.  Do not share utensils.  Change your tooth brush in 3 days.  Follow up with your PCP in 2-3 days  Go to the ED if symptoms worsen      Take an OTC cough, cold product for your symptoms

## 2024-11-24 NOTE — PROGRESS NOTES
[facility]        NAME: Kassy Bradley is a 44 y.o. female  : 1980    MRN: [id number]  DATE: 2024  TIME: 11:18 AM    Assessment and Plan   Flank pain [R10.9]  1. Flank pain  POCT urine dip    Urine culture    Urine culture    amoxicillin (AMOXIL) 875 mg tablet    fluconazole (DIFLUCAN) 150 mg tablet      2. Right acute serous otitis media, recurrence not specified  amoxicillin (AMOXIL) 875 mg tablet    fluconazole (DIFLUCAN) 150 mg tablet      3. Acute pharyngitis due to other specified organisms  amoxicillin (AMOXIL) 875 mg tablet    fluconazole (DIFLUCAN) 150 mg tablet      4. URI with cough and congestion  amoxicillin (AMOXIL) 875 mg tablet    fluconazole (DIFLUCAN) 150 mg tablet            Patient Instructions       Follow up with PCP in 3-5 days.  Proceed to  ER if symptoms worsen.    If tests have been performed at [facility], our office will contact you with results if changes need to be made to the care plan discussed with you at the visit.  You can review your full results on [facility] MyChart.    You have been prescribed amoxicillin for ear infection and sorethroat.  You are to take the diflucan as prescribed     You are to do warm salt water gargles 4 x daily.  Drink warm tea with honey and lemon.  Take tylenol or motrin as able for pain or fever.  Chloraseptic throat spray, cough drops.  Do not share utensils.  Change your tooth brush in 3 days.  Follow up with your PCP in 2-3 days  Go to the ED if symptoms worsen      Take an OTC cough, cold product for your symptoms       Chief Complaint     Chief Complaint   Patient presents with    Cold Like Symptoms     3 days of cough, congestion, and right ear pain.    Back Pain     Yesterday with flank pain.          History of Present Illness       This is a 44 year old female who states has had sorethroat, right ear pain, chills, x 3 days.  She states yesterday started with flank pain.  Denies any urinary symptoms or  pregnancy.  She states she has not taken anything for her symptoms.   Denies fevers, n/v/d.  She denies known exposure to covid or flu.  PMH is listed and previewed.     Back Pain        Review of Systems   Review of Systems   Constitutional:  Positive for chills.   HENT:  Positive for congestion, ear pain, rhinorrhea and sore throat.    Eyes: Negative.    Respiratory:  Positive for choking.    Cardiovascular: Negative.    Gastrointestinal: Negative.    Endocrine: Negative.    Genitourinary: Negative.    Musculoskeletal:  Positive for back pain.   Skin: Negative.    Allergic/Immunologic: Negative.    Neurological: Negative.    Hematological: Negative.    Psychiatric/Behavioral: Negative.           Current Medications       Current Outpatient Medications:     amoxicillin (AMOXIL) 875 mg tablet, Take 1 tablet (875 mg total) by mouth 2 (two) times a day for 10 days, Disp: 20 tablet, Rfl: 0    escitalopram (LEXAPRO) 20 mg tablet, Take 20 mg by mouth daily, Disp: , Rfl:     fluconazole (DIFLUCAN) 150 mg tablet, Take one tab po today and then one tab po on 11/27/24., Disp: 2 tablet, Rfl: 0    acetaminophen (TYLENOL) 650 mg CR tablet, Take 1 tablet (650 mg total) by mouth every 8 (eight) hours as needed for mild pain (Patient not taking: Reported on 11/24/2024), Disp: 30 tablet, Rfl: 0    ALPRAZolam (XANAX) 0.25 mg tablet, TAKE 1 TO 2 TABLETS BY MOUTH TWICE A DAY AS NEEDED FOR ANXIETY (Patient not taking: Reported on 11/24/2024), Disp: , Rfl:     ibuprofen (MOTRIN) 600 mg tablet, Take 1 tablet (600 mg total) by mouth every 6 (six) hours as needed for mild pain (Patient not taking: Reported on 11/24/2024), Disp: 30 tablet, Rfl: 0    Current Allergies     Allergies as of 11/24/2024    (No Known Allergies)            The following portions of the patient's history were reviewed and updated as appropriate: allergies, current medications, past family history, past medical history, past social history, past surgical history and  "problem list.     Past Medical History:   Diagnosis Date    A-fib (HCC)     no recurrence since  or so    Kidney stone     Menorrhagia     Vitamin D deficiency        Past Surgical History:   Procedure Laterality Date     SECTION      FL RETROGRADE PYELOGRAM  2023    NV ARTHROPLASTY RADIAL HEAD W/IMPLANT Right 2022    Procedure: ARTHROPLASTY RADIAL HEAD;  Surgeon: Teresita Wolff;  Location: [facility];  Service: Orthopedics    NV CYSTO/URETERO W/LITHOTRIPSY &INDWELL STENT INSRT Right 2023    Procedure: CYSTOSCOPY URETEROSCOPY WITH LITHOTRIPSY HOLMIUM LASER BASKET STONE RETRIEVAL,, RETROGRADE PYELOGRAM AND INSERTION STENT URETERAL;  Surgeon: Juan John MD;  [facility];  Service: Urology    NV HYSTEROSCOPY BX ENDOMETRIUM&/POLYPC W/WO D&C N/A 2021    Procedure: DILATATION AND CURETTAGE (D&C) WITH HYSTEROSCOPY;  Surgeon: Helena Chung MD;  Location: [facility];  Service: Gynecology    NV HYSTEROSCOPY ENDOMETRIAL ABLATION N/A 2021    Procedure: ABLATION ENDOMETRIAL VIVIANA;  Surgeon: Helena Chung MD;  [facility];  Service: Gynecology    NV TOTAL THYROID LOBECTOMY UNI W/WO ISTHMUSECTOMY Right 2017    Procedure: THYROID LOBECTOMY WITH RECURRENT LARYNGEAL NERVE MONITORING (IMPULSE MONITORING);  Surgeon: Osman George MD;  Location: [facility];  Service: ENT       Family History   Problem Relation Age of Onset    Thyroid disease Mother     Breast cancer Maternal Grandmother 60    Breast cancer Maternal Aunt     No Known Problems Son     No Known Problems Son     No Known Problems Daughter     No Known Problems Sister     No Known Problems Sister     No Known Problems Paternal Aunt          Medications have been verified.        Objective   /73 (BP Location: Right arm, Patient Position: Sitting, Cuff Size: Standard)   Pulse 94   Temp 97.8 °F (36.6 °C) (Temporal)   Resp 18   Ht 5' 6\" (1.676 m)   Wt 61.2 kg (135 lb)   SpO2 100%   BMI 21.79 " kg/m²   No LMP recorded.       Physical Exam     Physical Exam  Vitals and nursing note reviewed.   Constitutional:       General: She is not in acute distress.     Appearance: Normal appearance. She is normal weight. She is not ill-appearing, toxic-appearing or diaphoretic.   HENT:      Head: Normocephalic and atraumatic.      Right Ear: Tympanic membrane is bulging.      Left Ear: Tympanic membrane is bulging.      Ears:      Comments: B/L canals swollen red      Nose: Congestion and rhinorrhea present.      Mouth/Throat:      Mouth: Mucous membranes are moist.      Pharynx: Posterior oropharyngeal erythema present. No oropharyngeal exudate.   Eyes:      Extraocular Movements: Extraocular movements intact.   Cardiovascular:      Rate and Rhythm: Normal rate and regular rhythm.      Pulses: Normal pulses.      Heart sounds: Normal heart sounds. No murmur heard.  Pulmonary:      Effort: Pulmonary effort is normal. No respiratory distress.      Breath sounds: Normal breath sounds. No stridor. No wheezing, rhonchi or rales.   Chest:      Chest wall: No tenderness.   Musculoskeletal:         General: Normal range of motion.      Cervical back: Normal range of motion and neck supple.   Skin:     General: Skin is warm and dry.      Capillary Refill: Capillary refill takes less than 2 seconds.   Neurological:      General: No focal deficit present.      Mental Status: She is alert and oriented to person, place, and time.   Psychiatric:         Mood and Affect: Mood normal.         Behavior: Behavior normal.         Thought Content: Thought content normal.         Judgment: Judgment normal.         Poct urine negative.   Ua cx not sent - unable to remove from [facility].  There should be no charge and no specimen sent.  Pt was informed that no specimen would be sent

## 2025-01-25 ENCOUNTER — HOSPITAL ENCOUNTER (EMERGENCY)
Facility: HOSPITAL | Age: 45
Discharge: LEFT BEFORE DISCHARGE | End: 2025-01-25
Payer: COMMERCIAL

## 2025-01-25 VITALS
HEART RATE: 88 BPM | OXYGEN SATURATION: 98 % | SYSTOLIC BLOOD PRESSURE: 123 MMHG | DIASTOLIC BLOOD PRESSURE: 73 MMHG | RESPIRATION RATE: 18 BRPM | TEMPERATURE: 99.3 F

## 2025-01-25 LAB
FLUAV AG UPPER RESP QL IA.RAPID: POSITIVE
FLUBV AG UPPER RESP QL IA.RAPID: NEGATIVE
SARS-COV+SARS-COV-2 AG RESP QL IA.RAPID: NEGATIVE

## 2025-01-25 PROCEDURE — 87804 INFLUENZA ASSAY W/OPTIC: CPT

## 2025-01-25 PROCEDURE — 87811 SARS-COV-2 COVID19 W/OPTIC: CPT

## 2025-01-25 RX ORDER — VENLAFAXINE HYDROCHLORIDE 150 MG/1
1 CAPSULE, EXTENDED RELEASE ORAL DAILY
COMMUNITY
Start: 2024-11-23

## 2025-01-31 ENCOUNTER — HOSPITAL ENCOUNTER (OUTPATIENT)
Dept: RADIOLOGY | Facility: HOSPITAL | Age: 45
Discharge: HOME | End: 2025-01-31
Attending: STUDENT IN AN ORGANIZED HEALTH CARE EDUCATION/TRAINING PROGRAM
Payer: COMMERCIAL

## 2025-01-31 ENCOUNTER — RESULTS FOLLOW-UP (OUTPATIENT)
Dept: OBGYN CLINIC | Facility: CLINIC | Age: 45
End: 2025-01-31

## 2025-01-31 VITALS — BODY MASS INDEX: 21.69 KG/M2 | WEIGHT: 135 LBS | HEIGHT: 66 IN

## 2025-01-31 DIAGNOSIS — N63.15 BREAST LUMP ON RIGHT SIDE AT 6 O'CLOCK POSITION: ICD-10-CM

## 2025-01-31 DIAGNOSIS — N63.15 BREAST LUMP ON RIGHT SIDE AT 3 O'CLOCK POSITION: ICD-10-CM

## 2025-01-31 DIAGNOSIS — N63.0 BREAST MASS IN FEMALE: ICD-10-CM

## 2025-01-31 PROCEDURE — 76642 ULTRASOUND BREAST LIMITED: CPT

## 2025-01-31 PROCEDURE — 77066 DX MAMMO INCL CAD BI: CPT

## 2025-01-31 PROCEDURE — G0279 TOMOSYNTHESIS, MAMMO: HCPCS

## (undated) DEVICE — SUT MONOCRYL 3-0 PS-2 18 IN Y497G

## (undated) DEVICE — BIPOLAR CORD DISP

## (undated) DEVICE — BANDAGE, ESMARK LF STR 4"X9'(20/CS): Brand: CYPRESS

## (undated) DEVICE — SUT ETHILON 4-0 P-3 18 IN 691G

## (undated) DEVICE — STERILE MINERVA DISPOSABLE HANDPIECECONTENTS:(1) ONE SINGLE USE STERILE MINERVA ES DISPOSABLE HANDPIECE (1) ONE SINGLE USE STERILE SYRINGE(1) ONE SINGLE USE STERILE 8MM HEGAR DILATOR(1) ONE SINGLE USE NON-STERILE DESICCANT(1) ONE NON-STERILE HANDPIECE INSTRUCTIONS FOR USE(1)  ONE NON-STERILE DILATOR INSTRUCTIONS FOR USE: Brand: MINERVA SINGLE STERILE DISPOSABLE HANDPIECE

## (undated) DEVICE — BLADE SAGITTAL 25.6 X 9.5MM

## (undated) DEVICE — DISPOSABLE EQUIPMENT COVER: Brand: SMALL TOWEL DRAPE

## (undated) DEVICE — STERILE SURGICAL LUBRICANT,  TUBE: Brand: SURGILUBE

## (undated) DEVICE — HARMONIC FOCUS SHEARS 9CM LENGTH + ADAPTIVE TISSUE TECHNOLOGY FOR USE WITH BLUE HAND PIECE ONLY: Brand: HARMONIC FOCUS

## (undated) DEVICE — CHLORAPREP HI-LITE 26ML ORANGE

## (undated) DEVICE — PADDING CAST 4 IN  COTTON STRL

## (undated) DEVICE — GUIDEWIRE STRGHT TIP 0.035 IN  SOLO PLUS

## (undated) DEVICE — SYRINGE CATH TIP 50ML

## (undated) DEVICE — GLOVE SRG BIOGEL ECLIPSE 7

## (undated) DEVICE — LIGHT HANDLE COVER SLEEVE DISP BLUE STELLAR

## (undated) DEVICE — FIBER STD QUANTA 272 MICRON

## (undated) DEVICE — SUT VICRYL 2-0 CT-2 18 IN J726D

## (undated) DEVICE — PACK TUR

## (undated) DEVICE — SUT VICRYL 4-0 SH 27 IN J415H

## (undated) DEVICE — SPECIMEN CONTAINER STERILE PEEL PACK

## (undated) DEVICE — 3000CC GUARDIAN II: Brand: GUARDIAN

## (undated) DEVICE — MAYO STAND COVER: Brand: CONVERTORS

## (undated) DEVICE — UROCATCH BAG

## (undated) DEVICE — PLUMEPEN PRO 10FT

## (undated) DEVICE — NEEDLE 25GA X 1 IN SAFETY GLIDE

## (undated) DEVICE — TIBURON SPLIT SHEET: Brand: CONVERTORS

## (undated) DEVICE — UNIVERSAL MAJOR EXTREMITY,KIT: Brand: CARDINAL HEALTH

## (undated) DEVICE — SUT SILK 3-0 18 IN A184H

## (undated) DEVICE — GLOVE SRG BIOGEL 8

## (undated) DEVICE — BULB SYRINGE,IRRIGATION WITH PROTECTIVE CAP: Brand: DOVER

## (undated) DEVICE — PACK UNIVERSAL NECK

## (undated) DEVICE — BETHLEHEM UNIVERSAL MINOR VAG: Brand: CARDINAL HEALTH

## (undated) DEVICE — GLOVE INDICATOR PI UNDERGLOVE SZ 6.5 BLUE

## (undated) DEVICE — VIAL DECANTER

## (undated) DEVICE — CHLORAPREP HI-LITE 10.5ML ORANGE

## (undated) DEVICE — CUFF TOURNIQUET 18 X 5.5 IN QUICK CONNECT 2BLA

## (undated) DEVICE — GLOVE INDICATOR PI UNDERGLOVE SZ 7 BLUE

## (undated) DEVICE — ACE WRAP 4 IN STERILE

## (undated) DEVICE — ADHESIVE SKIN HIGH VISCOSITY EXOFIN 1ML

## (undated) DEVICE — INTENDED FOR TISSUE SEPARATION, AND OTHER PROCEDURES THAT REQUIRE A SHARP SURGICAL BLADE TO PUNCTURE OR CUT.: Brand: BARD-PARKER ® CARBON RIB-BACK BLADES

## (undated) DEVICE — TOURNIQUET 12 IN STERILE SINGLE

## (undated) DEVICE — SYRINGE 10ML LL

## (undated) DEVICE — 60 ML SYRINGE,TOOMEY TYPE: Brand: MONOJECT

## (undated) DEVICE — ANTIBACTERIAL UNDYED BRAIDED (POLYGLACTIN 910), SYNTHETIC ABSORBABLE SUTURE: Brand: COATED VICRYL

## (undated) DEVICE — COBAN 4 IN STERILE

## (undated) DEVICE — INVIEW CLEAR LEGGINGS: Brand: CONVERTORS

## (undated) DEVICE — ARISTA AH ABSORBABLE HEMOSTATIC PARTICLES: Brand: ARISTA™ AH

## (undated) DEVICE — TUBING SUCTION 5MM X 12 FT

## (undated) DEVICE — U-DRAPE: Brand: CONVERTORS

## (undated) DEVICE — 3M™ STERI-STRIP™ REINFORCED ADHESIVE SKIN CLOSURES, R1547, 1/2 IN X 4 IN (12 MM X 100 MM), 6 STRIPS/ENVELOPE: Brand: 3M™ STERI-STRIP™

## (undated) DEVICE — CONMED ACCESSORY ELECTRODE, FLAT BLADE WITH EXTENDED INSULATION: Brand: CONMED

## (undated) DEVICE — SUT SILK 2-0 18 IN A185H

## (undated) DEVICE — ARM SLING: Brand: DEROYAL

## (undated) DEVICE — GLOVE PI ULTRA TOUCH SZ.6.5

## (undated) DEVICE — CATH URETERAL 5FR X 70 CM FLEX TIP POLYUR BARD

## (undated) DEVICE — SUT MONOCRYL 2-0 SH 27 IN Y417H

## (undated) DEVICE — GROUNDING PAD UNIVERSAL SLW

## (undated) DEVICE — VALVE SUCTION-IRR 2.5MM ADJ UROSEAL

## (undated) DEVICE — DRESSING MEPILEX AG BORDER POST-OP 4 X 6 IN

## (undated) DEVICE — REM POLYHESIVE ADULT PATIENT RETURN ELECTRODE: Brand: VALLEYLAB

## (undated) DEVICE — GLOVE SRG BIOGEL 7.5

## (undated) DEVICE — THIN OFFSET (9.0 X 0.38 X 25.0MM)

## (undated) DEVICE — LIGACLIP MCA MULTIPLE CLIP APPLIERS, 20 SMALL CLIPS: Brand: LIGACLIP

## (undated) DEVICE — GLOVE INDICATOR PI UNDERGLOVE SZ 8 BLUE

## (undated) DEVICE — GLOVE SRG BIOGEL 6.5

## (undated) DEVICE — INSUFFLATION TUBING PRIMFLO

## (undated) DEVICE — SHEATH URETERAL ACCESS 10/12FR 35CM PROXIS

## (undated) DEVICE — PREMIUM DRY TRAY LF: Brand: MEDLINE INDUSTRIES, INC.

## (undated) DEVICE — CHLORHEXIDINE 4PCT 4 OZ

## (undated) DEVICE — BASKET SPECIMEN RETRIVAL 1.9FR 120CM

## (undated) DEVICE — IMPERVIOUS STOCKINETTE: Brand: DEROYAL

## (undated) DEVICE — DRAPE C-ARM X-RAY

## (undated) DEVICE — MEDI-VAC YANK SUCT HNDL W/TPRD BULBOUS TIP: Brand: CARDINAL HEALTH

## (undated) DEVICE — SCD SEQUENTIAL COMPRESSION COMFORT SLEEVE MEDIUM KNEE LENGTH: Brand: KENDALL SCD